# Patient Record
Sex: MALE | Race: WHITE | Employment: OTHER | URBAN - METROPOLITAN AREA
[De-identification: names, ages, dates, MRNs, and addresses within clinical notes are randomized per-mention and may not be internally consistent; named-entity substitution may affect disease eponyms.]

---

## 2017-06-20 PROBLEM — E11.9 TYPE 2 DIABETES MELLITUS WITHOUT COMPLICATION (HCC): Status: ACTIVE | Noted: 2017-06-20

## 2017-09-18 PROBLEM — E11.9 TYPE 2 DIABETES MELLITUS WITHOUT COMPLICATION (HCC): Status: RESOLVED | Noted: 2017-06-20 | Resolved: 2017-09-18

## 2017-10-17 ENCOUNTER — HOSPITAL ENCOUNTER (OUTPATIENT)
Dept: GENERAL RADIOLOGY | Age: 77
Discharge: HOME | End: 2017-10-17
Attending: INTERNAL MEDICINE
Payer: MEDICARE

## 2017-10-17 DIAGNOSIS — M48.061 SPINAL STENOSIS OF LUMBAR REGION WITH RADICULOPATHY: ICD-10-CM

## 2017-10-17 DIAGNOSIS — M54.50 CHRONIC MIDLINE LOW BACK PAIN WITHOUT SCIATICA: ICD-10-CM

## 2017-10-17 DIAGNOSIS — M54.16 SPINAL STENOSIS OF LUMBAR REGION WITH RADICULOPATHY: ICD-10-CM

## 2017-10-17 DIAGNOSIS — G89.29 CHRONIC MIDLINE LOW BACK PAIN WITHOUT SCIATICA: ICD-10-CM

## 2017-10-17 PROBLEM — R31.9 HEMATURIA: Status: ACTIVE | Noted: 2017-10-17

## 2017-10-17 PROBLEM — Z98.890 STATUS POST LAMINECTOMY: Status: ACTIVE | Noted: 2017-10-17

## 2017-10-17 PROCEDURE — 72100 X-RAY EXAM L-S SPINE 2/3 VWS: CPT

## 2017-10-17 NOTE — PROGRESS NOTES
X-ray of the spine shows no acute bony abnormalities. There is evidence of degenerative changes. Follow management as discussed today. You have been referred to physical therapy.

## 2017-10-23 NOTE — PROGRESS NOTES
Ambulatory/Rehab Services H2 Model Falls Risk Assessment    Risk Factor Pts. ·   Confusion/Disorientation/Impulsivity  []    4 ·   Symptomatic Depression  []   2 ·   Altered Elimination  []   1 ·   Dizziness/Vertigo  []   1 ·   Gender (Male)  [x]   1 ·   Any administered antiepileptics (anticonvulsants):  []   2 ·   Any administered benzodiazepines:  []   1 ·   Visual Impairment (specify):  []   1 ·   Portable Oxygen Use  []   1 ·   Orthostatic ? BP  []   1 ·   History of Recent Falls (within 3 mos.)  []   5     Ability to Rise from Chair (choose one) Pts. ·   Ability to rise in a single movement  [x]   0 ·   Pushes up, successful in one attempt  []   1 ·   Multiple attempts, but successful  []   3 ·   Unable to rise without assistance  []   4   Total: (5 or greater = High Risk) 1     Falls Prevention Plan:   []                Physical Limitations to Exercise (specify):   []                Mobility Assistance Device (type):   []                Exercise/Equipment Adaptation (specify):    ©2010 [facility] of [address] Patent #[id number].  Federal Law prohibits the replication, distribution or use without written permission from [facility] [place]

## 2017-10-23 NOTE — PROGRESS NOTES
Bucky Moore  : 1940 [address] [facility] at [address], [address], [facility], [address]  [phone]   [phone]       OUTPATIENT PHYSICAL THERAPY:Initial Assessment, Treatment Day: Day of Assessment and AM 10/24/2017    ICD-10: Treatment Diagnosis:   Low back pain (M54.5)        Muscle weakness (generalized) (M62.81)   Unsteadiness on feet (R26.81)             Precautions/Allergies:   Review of patient's allergies indicates no known allergies. Fall Risk Score: 1 (? 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 REFERRING PHYSICIAN: Alfa Shin*  RETURN PHYSICIAN APPOINTMENT: TBD by patient if needed. INITIAL ASSESSMENT:  Mr. Bucky Moore has attended 1 physical therapy session including initial evaluation. He has localized back pain that is associated with spinal stenosis (flexion prefererence)   Bucky Moore exhibits decreased flexibility, decreased balance, increased pain (endurance related), decreased postural and core strength, decreased functional tolerance. No pelvic malalignment upon initial evaluation but decreased posture. Pain is endurance in nature as he is deconditioned. Hx of Laminectomy 2016. Bucky Moore will benefit from skilled PT (medically necessary) to address above deficits affecting participation in basic ADLs and overall functional tolerance. Manual techniques (stretching, joint mobilizations, soft tissue mobilization/myofascial release), postural exercises/education, therapeutic techniques/activities, and HEP will be performed as appropriate addressing Kyle Carolina's current condition. PROBLEM LIST (Impacting functional limitations):  1. Decreased Strength  2.  Decreased ADL/Functional Activities  3. Decreased Transfer Abilities  4. Decreased Ambulation Ability/Technique  5. Decreased Balance  6. Increased Pain  7. Decreased Activity Tolerance  8. Increased Fatigue  9. Increased Shortness of Breath  10. Decreased Flexibility/Joint Mobility  11. Decreased [place] with Home Exercise Program INTERVENTIONS PLANNED:  1. Balance Exercise  2. Bed Mobility  3. Cold  4. Cryotherapy  5. Electrical Stimulation  6. Family Education  7. Gait Training  8. Heat  9. Home Exercise Program (HEP)  10. Manual Therapy  11. Neuromuscular Re-education/Strengthening  12. Range of Motion (ROM)  13. Therapeutic Activites  14. Therapeutic Exercise/Strengthening  15. Transfer Training   TREATMENT PLAN:  Effective Dates: 10/23/17 TO 1/23/18. Frequency/Duration: 1 times a week for 12 weeks --patient requested to come 1x a week--will adjust if needed to meet goals. GOALS: (Goals have been discussed and agreed upon with patient.)  Short Term Goals 4 weeks   1. Rian Fairchild will be independent with HEP  2. Rian Fairchild will participate in LE stretching program to increase flexibility  3. Rian Fairchild will participate in core stabilization exercises to help with stabilization during ADLs  4. Rian Fairchild will participate in LE strengthening program with weights as appropriate to help with gait and elevations  5. Rian Fairchild will participate in static and dynamic balance activities to decrease the risk for falls  6. Rian Fairchild will be able to stand > 10 minutes with <=2/10 pain and minima to no difficulty to wash dishes and perform household ADLs. 7.   Long Term Goals 8 weeks   1. Rian Fairchild will demonstrate a 10 point improvement on the Oswestry to show improvement in function  2. Rian Fairchild will report 0/10 pain at rest and during ADLs  3. Rian Fairchild will demonstrate 5/5 LE strength on manual muscle testing  4.  Rian Fairchild will be able to perform SLS >5 seconds bilaterally to help with gait and improve balance  Rehabilitation Potential For Stated Goals: Good  Regarding Kyle Carolina's therapy, I certify that the treatment plan above will be carried out by a therapist or under their direction. Thank you for this referral,  RAUL StewardT     Referring Physician Signature: Grant Bergman*              Date                    HISTORY:   History of Present Injury/Illness (Reason for Referral): I am having lower back pain. I had spinal stenosis for 10+ years and spinal surgery last year (laminectomy). Now, my lower back is bothering me. He has pain with standing a few minutes. · Aggravating factors: Standing > 10 minutes. Walking > 10 minutes. · Relieving factors: Sitting. Per chart review 10.17.17:     Patient complains of gradually worsening of lower back pain for last several month. He states that the pain is occurring in the area below where he had his previous back surgery. Pain is aggravated when he stands for a long time for example when he is washing dishes. Pain is relieved by sitting and by using tramadol. He describes the pain as a scale of 6 out of 10 at its worst .  Tramadol tends to remove the pain. Pain is nonradiating. Pain does not interfere with sleep. Since his laminectomy in April 2016 he has regained some strength in his legs and denies any worsening of leg strength. He has used long-term tramadol for this pain before. He had some medications left over and he has been using it occasionally over the last 2 days to have relief of pain. Tylenol did not relieve the pain. He wants to avoid nonsteroidals because of concerns about its effect on the kidney. He denies any recent trauma. There has been no fevers or chills. He has asked for refill of tramadol for the pain.   Past Medical History/Comorbidities:   Mr. Savanna Ballard  has a past medical history of Arthritis (6/23/2015); BDR (background diabetic retinopathy) ([facility] [address] (6/23/2015); Depression (6/23/2015); Diabetes mellitus type 2, controlled ([address] Diabetic retinopathy ([address] (6/23/2015); Heartburn (6/23/2015); Hyperlipidemia (6/23/2015); Right-sided heart failure; Sleep apnea; Spinal stenosis; Spinal stenosis of lumbar region with radiculopathy (10/17/2017); Status post laminectomy (10/17/2017); and Type II or unspecified type diabetes mellitus without mention of complication, not stated as uncontrolled. Mr. Leonid Hollingsworth  has a past surgical history that includes cyst removal; cyst removal; heent (Right, 2014); orthopaedic (04/05/2016); cataract removal (Left); and colonoscopy (10/10/2017). Social History/Living Environment:        Social History     Social History    Marital status:      Spouse name: N/A    Number of children: N/A    Years of education: N/A     Occupational History    Not on file. Social History Main Topics    Smoking status: Never Smoker    Smokeless tobacco: Never Used    Alcohol use No    Drug use: No    Sexual activity: No     Other Topics Concern    Not on file     Social History Narrative     Prior Level of Function/Work/Activity:  Retired.       Active Ambulatory Problems     Diagnosis Date Noted    Obstructive sleep apnea (adult) (pediatric) 03/19/2015    Obesity 03/19/2015    Hypertension 03/19/2015    Lower extremity edema 03/19/2015    Gastroesophageal reflux disease 03/19/2015    Benign prostatic hyperplasia 03/19/2015    CKD (chronic kidney disease) 02/25/2016    Weakness of lower extremity 03/03/2016    Controlled type 2 diabetes mellitus with complication, without long-term current use of insulin ([address] 10/20/2016    Background diabetic retinopathy ([facility] [address] 11/09/2016    Bilateral edema of lower extremity 11/12/2016    Chronic midline low back pain without sciatica 10/17/2017    Status post laminectomy 10/17/2017    Spinal stenosis of lumbar region with radiculopathy 10/17/2017    Hematuria 10/17/2017     Resolved Ambulatory Problems     Diagnosis Date Noted    Hypoxemia 03/19/2015    Shortness of breath 03/19/2015    Dyslipidemia 03/19/2015    Right-sided heart failure 03/19/2015    Diabetes ([address] 03/19/2015    FH: BPH (benign prostatic hypertrophy) 03/19/2015    Hyperlipidemia 06/23/2015    Diabetic retinopathy ([address] 06/23/2015    Spinal stenosis     Weakness 03/03/2016    Diabetes mellitus type II, controlled ([address] 07/21/2016    Cough 07/21/2016    Controlled type 2 diabetes mellitus without complication, without long-term current use of insulin ([address] 11/12/2016    Type 2 diabetes mellitus without complication ([address] 80/87/7856     Past Medical History:   Diagnosis Date    Arthritis 6/23/2015    BDR (background diabetic retinopathy) ([address] 6/23/2015    Depression 6/23/2015    Diabetes mellitus type 2, controlled ([address]     Diabetic retinopathy ([facility] [address] 6/23/2015    Heartburn 6/23/2015    Hyperlipidemia 6/23/2015    Right-sided heart failure     Sleep apnea     Spinal stenosis     Spinal stenosis of lumbar region with radiculopathy 10/17/2017    Status post laminectomy 10/17/2017    Type II or unspecified type diabetes mellitus without mention of complication, not stated as uncontrolled      Note: Patient denies any increase of symptoms with cough, sneeze or valsalva. Patient denies any saddle paresthesia or bowel/bladder deficits. Personal Factors:          Sex:  male        Age:  78 y.o.     Current Medications:    Current Outpatient Prescriptions:     traMADol (ULTRAM) 50 mg tablet, Take 2 tabs bid prn for back pain  Indications: Pain, Disp: 30 Tab, Rfl: 0    omeprazole (PRILOSEC) 20 mg capsule, TAKE ONE CAPSULE BY MOUTH TWICE DAILY, Disp: 180 Cap, Rfl: 3    metFORMIN (GLUCOPHAGE) 500 mg tablet, TAKE TWO TABLETS BY MOUTH IN THE MORNING AND TWO IN THE EVENING, Disp: 120 Tab, Rfl: 11    sulindac (CLINORIL) 200 mg tablet, TAKE ONE TABLET BY MOUTH TWICE DAILY, Disp: 180 Tab, Rfl: 0    glucose blood VI test strips (ACCU-CHEK BALBIR PLUS TEST STRP) strip, Use to check blood sugar 1 time daily. Dx code: E11.9, Disp: 100 Strip, Rfl: 11    linagliptin (TRADJENTA) 5 mg tablet, Take 1 Tab by mouth daily. , Disp: 30 Tab, Rfl: 11    tamsulosin (FLOMAX) 0.4 mg capsule, Take 1 Cap by mouth daily. , Disp: 90 Cap, Rfl: 3    pravastatin (PRAVACHOL) 80 mg tablet, Take 1 Tab by mouth daily. , Disp: 90 Tab, Rfl: 3    lisinopril (PRINIVIL, ZESTRIL) 5 mg tablet, Take 1 Tab by mouth daily. , Disp: 90 Tab, Rfl: 3    triamterene-hydroCHLOROthiazide (MAXZIDE-25MG) 37.5-25 mg per tablet, Take 1 Tab by mouth daily. , Disp: 90 Tab, Rfl: 3    fluticasone (FLONASE) 50 mcg/actuation nasal spray, 2 Sprays by Both Nostrils route daily. , Disp: 1 Bottle, Rfl: 3    multivitamin (ONE A DAY) tablet, Take 1 Tab by mouth daily. , Disp: , Rfl:     aspirin delayed-release 81 mg tablet, Take  by mouth daily. , Disp: , Rfl:     cholecalciferol, VITAMIN D3, (VITAMIN D3) 5,000 unit tab tablet, Take  by mouth daily. , Disp: , Rfl:     cpap machine kit, by Does Not Apply route. autopap 7-20, Disp: , Rfl:      Date Last Reviewed:  10/24/2017   Number of Personal Factors/Comorbidities that affect the Plan of Care: 3+: HIGH COMPLEXITY   EXAMINATION:   Observation/Orthostatic Postural Assessment:          Very large girth and uses cane to ambulate. Uses cane in R hand---has been using for a long time. Palpation:          No tenderness to low back with palpation.     ROM:            AROM/PROM         Joint: Eval Date:  Re-Assess Date:  Re-Assess Date:    Active ROM RIGHT LEFT RIGHT LEFT RIGHT LEFT   Knee Extension [facility] WFL       Knee Flexion [facility]       Hip Flexion Limited Limited       Hip Abduction [facility]       Lumbar Flexion 6 in from floor tips of fingers        Lumbar Extension 80% with no pain        Lumbar Side-bending 100% with no pain, no pain with % with no pain, no pain with OP       Lumbar Rotation 100% with no pain, no pain with % with no pain, no pain with OP         Strength:     Eval Date:  Re-Assess Date:  Re-Assess Date:      RIGHT LEFT RIGHT LEFT RIGHT LEFT   Knee Flexion 4/5 4/5       Knee Extension 4+/5 4+/5       Hip Flexion 4/5 4/5       Hip Abduction 5/5 5/5       Ankle Dorsiflexion 5/5 5/5                           Single leg stance right/left: 2 seconds bilaterally with eyes open.                Deep squat: Difficulty performing secondary to balance deficits    Ham 90/90:   RIGHT LE: 35 deg   LEFT LE: 40 deg    Special Tests: Assessed @ Initial Visit    CELIA 4: Negative   SLUMP TEST:  Negative  Neurological Screen: Assessed @ Initial Visit    RADIATING SYMPTOMS?: YES/NO---None, localized pain. Functional Mobility:  Assessed @ Initial Visit Affecting participation in basic ADLs and functional tasks. Balance:          Medial to lateral sway with ambulation--uses cane and has overall fair balance. Body Structures Involved:  1. Bones  2. Joints  3. Muscles  4. Ligaments Body Functions Affected:  1. Sensory/Pain  2. Neuromusculoskeletal  3. Movement Related Activities and Participation Affected:  1. Mobility  2. Self Care   Number of elements that affect the Plan of Care: 4+: HIGH COMPLEXITY   CLINICAL PRESENTATION:   Presentation: Evolving clinical presentation with changing clinical characteristics: MODERATE COMPLEXITY   CLINICAL DECISION MAKING:   Outcome Measure: Tool Used: Modified Oswestry Low Back Pain Questionnaire  Score:  Initial: 23/50  Most Recent: X/50 (Date: -- )   Interpretation of Score: Each section is scored on a 0-5 scale, 5 representing the greatest disability. The scores of each section are added together for a total score of 50. Score 0 1-10 11-20 21-30 31-40 41-49 50   Modifier CH CI CJ CK CL CM CN     ?  Mobility - Walking and Moving Around:     - CURRENT STATUS: CK - 40%-59% impaired, limited or restricted    - GOAL STATUS: CJ - 20%-39% impaired, limited or restricted    - D/C STATUS:  ---------------To be determined---------------    Medical Necessity:   · Skilled intervention continues to be required due to above deficits affecting participation in basic ADLs and overall functional tolerance. Reason for Services/Other Comments:  · Patient continues to require skilled intervention due to  above deficits affecting participation in basic ADLs and overall functional tolerance. Use of outcome tool(s) and clinical judgement create a POC that gives a: Clear prediction of patient's progress: LOW COMPLEXITY   TREATMENT:   (In addition to Assessment/Re-Assessment sessions the following treatments were rendered)    Eval 20 minutes     THERAPEUTIC EXERCISE: (15 minutes):  Exercises per grid below to improve mobility, strength and balance. Required minimal visual and verbal cues to promote proper body alignment and promote proper body posture. Progressed resistance, range and complexity of movement as indicated. Date:  10/24/17 Date:   Date:     Activity/Exercise Parameters Parameters Parameters   Hamstring Stretch 30\"x3     LTR 10\"X10     Bridging 20     Hookyling hip abduction Green 2x 10     NuStep      Standing marching      Standing heel raises      Gastroc Stretch      Toe Taps standing                                    MANUAL THERAPY: (10  minutes): Joint mobilization, Soft tissue mobilization and Manipulation was utilized and necessary because of the patient's restricted joint motion, painful spasm, restricted motion of soft tissue. (Used abbreviations: MET - muscle energy technique; PNF - proprioceptive neuromuscular facilitation; NMR - neuromuscular re-education; AP - anterior to posterior; PA - posterior to anterior)  STM to lower back in R sidelying as well as manual stretching B hamstrings and lateral hips. MODALITIES: (None minutes):               Treatment/Session Assessment:  Bucky Moore verbalized understanding of role of PT and POC.   · Pain/ Symptoms: Initial: 0/10--if standing >10 minutes he will get a dull ache to middle lower back. Post Session:  5/10 ·   Compliance with Program/Exercises: Will assess as treatment progresses. · Recommendations/Intent for next treatment session: \"Next visit will focus on advancements to more challenging activities\".     Future Appointments  Date Time Provider Chelita Aguilar   10/27/2017 10:30 AM Lynn Carcamo DPT [facility]   11/2/2017 11:00 AM C[facility]   11/7/2017 10:00 AM [facility]   11/16/2017 10:00 AM CELSO [facility]   1/11/2018 8:30 AM [facility]   1/18/2018 9:15 AM Maycol Luciano MD [facility]       Total Treatment Duration:  PT Patient Time In/Time Out  Time In: 1000  Time Out: [address] Trujillo [facility], [facility], DPT

## 2017-10-24 ENCOUNTER — HOSPITAL ENCOUNTER (OUTPATIENT)
Dept: PHYSICAL THERAPY | Age: 77
Discharge: HOME | End: 2017-10-24
Attending: INTERNAL MEDICINE
Payer: MEDICARE

## 2017-10-24 DIAGNOSIS — G89.29 CHRONIC MIDLINE LOW BACK PAIN WITHOUT SCIATICA: ICD-10-CM

## 2017-10-24 DIAGNOSIS — M54.50 CHRONIC MIDLINE LOW BACK PAIN WITHOUT SCIATICA: ICD-10-CM

## 2017-10-24 PROCEDURE — G8978 MOBILITY CURRENT STATUS: HCPCS

## 2017-10-24 PROCEDURE — 97140 MANUAL THERAPY 1/> REGIONS: CPT

## 2017-10-24 PROCEDURE — 97161 PT EVAL LOW COMPLEX 20 MIN: CPT

## 2017-10-24 PROCEDURE — G8979 MOBILITY GOAL STATUS: HCPCS

## 2017-10-24 PROCEDURE — 97110 THERAPEUTIC EXERCISES: CPT

## 2017-10-26 NOTE — PROGRESS NOTES
Jaymie Cortez  : 1940 [address] at [place] [address] [address], [place], [address]  [phone]   [phone]       OUTPATIENT PHYSICAL THERAPY:Daily Note, Treatment Day: 2nd and AM 10/27/2017    ICD-10: Treatment Diagnosis:   Low back pain (M54.5)        Muscle weakness (generalized) (M62.81)   Unsteadiness on feet (R26.81)             Precautions/Allergies:   Review of patient's allergies indicates no known allergies. Fall Risk Score: 1 (? 5 = High Risk)  MD Orders: Eval and Treat ---Address strengthening and gait  MEDICAL/REFERRING DIAGNOSIS:  Low back pain [M54.5]   DATE OF ONSET: Chronic (exacerbated the last couple of months). REFERRING PHYSICIAN: Priyank Mcgee*  RETURN PHYSICIAN APPOINTMENT: TBD by patient if needed. INITIAL ASSESSMENT:  Mr. Jaymie Cortez has attended 1 physical therapy session including initial evaluation. He has localized back pain that is associated with spinal stenosis (flexion prefererence)   Jaymie Cortez exhibits decreased flexibility, decreased balance, increased pain (endurance related), decreased postural and core strength, decreased functional tolerance. No pelvic malalignment upon initial evaluation but decreased posture. Pain is endurance in nature as he is deconditioned. Hx of Laminectomy 2016. Jaymie Cortez will benefit from skilled PT (medically necessary) to address above deficits affecting participation in basic ADLs and overall functional tolerance. Manual techniques (stretching, joint mobilizations, soft tissue mobilization/myofascial release), postural exercises/education, therapeutic techniques/activities, and HEP will be performed as appropriate addressing Kyle Carolina's current condition. PROBLEM LIST (Impacting functional limitations):  1. Decreased Strength  2. Decreased ADL/Functional Activities  3. Decreased Transfer Abilities  4.  Decreased Ambulation Ability/Technique  5. Decreased Balance  6. Increased Pain  7. Decreased Activity Tolerance  8. Increased Fatigue  9. Increased Shortness of Breath  10. Decreased Flexibility/Joint Mobility  11. Decreased Hale with Home Exercise Program INTERVENTIONS PLANNED:  1. Balance Exercise  2. Bed Mobility  3. Cold  4. Cryotherapy  5. Electrical Stimulation  6. Family Education  7. Gait Training  8. Heat  9. Home Exercise Program (HEP)  10. Manual Therapy  11. Neuromuscular Re-education/Strengthening  12. Range of Motion (ROM)  13. Therapeutic Activites  14. Therapeutic Exercise/Strengthening  15. Transfer Training   TREATMENT PLAN:  Effective Dates: 10/23/17 TO 1/23/18. Frequency/Duration: 1 times a week for 12 weeks --patient requested to come 1x a week--will adjust if needed to meet goals. GOALS: (Goals have been discussed and agreed upon with patient.)  Short Term Goals 4 weeks   1. Nani Banuelos will be independent with HEP  2. Nani Banuelos will participate in LE stretching program to increase flexibility  3. Nani Banuelos will participate in core stabilization exercises to help with stabilization during ADLs  4. Nani Banuelos will participate in LE strengthening program with weights as appropriate to help with gait and elevations  5. Nani Banuelos will participate in static and dynamic balance activities to decrease the risk for falls  6. Nani Banuelos will be able to stand > 10 minutes with <=2/10 pain and minima to no difficulty to wash dishes and perform household ADLs. 7.   Long Term Goals 8 weeks   1. Nani Banuelos will demonstrate a 10 point improvement on the Oswestry to show improvement in function  2. Nani Banuelos will report 0/10 pain at rest and during ADLs  3. Nani Banuelos will demonstrate 5/5 LE strength on manual muscle testing  4.  Nani Banuelos will be able to perform SLS >5 seconds bilaterally to help with gait and improve balance  Rehabilitation Potential For Stated Goals: Good  Regarding Kyle Carolina's therapy, I certify that the treatment plan above will be carried out by a therapist or under their direction. Thank you for this referral,  Rosita Mishra DPT     Referring Physician Signature: Remywaldemar Muniz*              Date                    HISTORY:   History of Present Injury/Illness (Reason for Referral): I am having lower back pain. I had spinal stenosis for 10+ years and spinal surgery last year (laminectomy). Now, my lower back is bothering me. He has pain with standing a few minutes. · Aggravating factors: Standing > 10 minutes. Walking > 10 minutes. · Relieving factors: Sitting. Per chart review 10.17.17:     Patient complains of gradually worsening of lower back pain for last several month. He states that the pain is occurring in the area below where he had his previous back surgery. Pain is aggravated when he stands for a long time for example when he is washing dishes. Pain is relieved by sitting and by using tramadol. He describes the pain as a scale of 6 out of 10 at its worst .  Tramadol tends to remove the pain. Pain is nonradiating. Pain does not interfere with sleep. Since his laminectomy in April 2016 he has regained some strength in his legs and denies any worsening of leg strength. He has used long-term tramadol for this pain before. He had some medications left over and he has been using it occasionally over the last 2 days to have relief of pain. Tylenol did not relieve the pain. He wants to avoid nonsteroidals because of concerns about its effect on the kidney. He denies any recent trauma. There has been no fevers or chills. He has asked for refill of tramadol for the pain. Past Medical History/Comorbidities:   Mr. Albin Neal  has a past medical history of Arthritis (6/23/2015); BDR (background diabetic retinopathy) ([facility] [address] (6/23/2015); Depression (6/23/2015);  Diabetes mellitus type 2, controlled ([address] Diabetic retinopathy ([address] (6/23/2015); Heartburn (6/23/2015); Hyperlipidemia (6/23/2015); Right-sided heart failure; Sleep apnea; Spinal stenosis; Spinal stenosis of lumbar region with radiculopathy (10/17/2017); Status post laminectomy (10/17/2017); and Type II or unspecified type diabetes mellitus without mention of complication, not stated as uncontrolled. Mr. Vladimir Templeton  has a past surgical history that includes cyst removal; cyst removal; heent (Right, 2014); orthopaedic (04/05/2016); cataract removal (Left); and colonoscopy (10/10/2017). Social History/Living Environment:        Social History     Social History    Marital status:      Spouse name: N/A    Number of children: N/A    Years of education: N/A     Occupational History    Not on file. Social History Main Topics    Smoking status: Never Smoker    Smokeless tobacco: Never Used    Alcohol use No    Drug use: No    Sexual activity: No     Other Topics Concern    Not on file     Social History Narrative     Prior Level of Function/Work/Activity:  Retired.       Active Ambulatory Problems     Diagnosis Date Noted    Obstructive sleep apnea (adult) (pediatric) 03/19/2015    Obesity 03/19/2015    Hypertension 03/19/2015    Lower extremity edema 03/19/2015    Gastroesophageal reflux disease 03/19/2015    Benign prostatic hyperplasia 03/19/2015    CKD (chronic kidney disease) 02/25/2016    Weakness of lower extremity 03/03/2016    Controlled type 2 diabetes mellitus with complication, without long-term current use of insulin ([address] 10/20/2016    Background diabetic retinopathy ([address] 11/09/2016    Bilateral edema of lower extremity 11/12/2016    Chronic midline low back pain without sciatica 10/17/2017    Status post laminectomy 10/17/2017    Spinal stenosis of lumbar region with radiculopathy 10/17/2017    Hematuria 10/17/2017     Resolved Ambulatory Problems     Diagnosis Date Noted    Hypoxemia 03/19/2015    Shortness of breath 03/19/2015    Dyslipidemia 03/19/2015    Right-sided heart failure 03/19/2015    Diabetes ([facility] [address] 03/19/2015    FH: BPH (benign prostatic hypertrophy) 03/19/2015    Hyperlipidemia 06/23/2015    Diabetic retinopathy ([address] 06/23/2015    Spinal stenosis     Weakness 03/03/2016    Diabetes mellitus type II, controlled ([address] 07/21/2016    Cough 07/21/2016    Controlled type 2 diabetes mellitus without complication, without long-term current use of insulin ([address] 11/12/2016    Type 2 diabetes mellitus without complication ([address] [birth year]     Past Medical History:   Diagnosis Date    Arthritis 6/23/2015    BDR (background diabetic retinopathy) ([facility] [address] 6/23/2015    Depression 6/23/2015    Diabetes mellitus type 2, controlled ([address]     Diabetic retinopathy ([facility] [address] 6/23/2015    Heartburn 6/23/2015    Hyperlipidemia 6/23/2015    Right-sided heart failure     Sleep apnea     Spinal stenosis     Spinal stenosis of lumbar region with radiculopathy 10/17/2017    Status post laminectomy 10/17/2017    Type II or unspecified type diabetes mellitus without mention of complication, not stated as uncontrolled      Note: Patient denies any increase of symptoms with cough, sneeze or valsalva. Patient denies any saddle paresthesia or bowel/bladder deficits. Personal Factors:          Sex:  male        Age:  78 y.o.     Current Medications:    Current Outpatient Prescriptions:     traMADol (ULTRAM) 50 mg tablet, Take 2 tabs bid prn for back pain  Indications: Pain, Disp: 30 Tab, Rfl: 0    omeprazole (PRILOSEC) 20 mg capsule, TAKE ONE CAPSULE BY MOUTH TWICE DAILY, Disp: 180 Cap, Rfl: 3    metFORMIN (GLUCOPHAGE) 500 mg tablet, TAKE TWO TABLETS BY MOUTH IN THE MORNING AND TWO IN THE EVENING, Disp: 120 Tab, Rfl: 11    sulindac (CLINORIL) 200 mg tablet, TAKE ONE TABLET BY MOUTH TWICE DAILY, Disp: 180 Tab, Rfl: 0    glucose blood VI test strips (ACCU-CHEK BALBIR PLUS TEST STRP) strip, Use to check blood sugar 1 time daily. Dx code: E11.9, Disp: 100 Strip, Rfl: 11    linagliptin (TRADJENTA) 5 mg tablet, Take 1 Tab by mouth daily. , Disp: 30 Tab, Rfl: 11    tamsulosin (FLOMAX) 0.4 mg capsule, Take 1 Cap by mouth daily. , Disp: 90 Cap, Rfl: 3    pravastatin (PRAVACHOL) 80 mg tablet, Take 1 Tab by mouth daily. , Disp: 90 Tab, Rfl: 3    lisinopril (PRINIVIL, ZESTRIL) 5 mg tablet, Take 1 Tab by mouth daily. , Disp: 90 Tab, Rfl: 3    triamterene-hydroCHLOROthiazide (MAXZIDE-25MG) 37.5-25 mg per tablet, Take 1 Tab by mouth daily. , Disp: 90 Tab, Rfl: 3    fluticasone (FLONASE) 50 mcg/actuation nasal spray, 2 Sprays by Both Nostrils route daily. , Disp: 1 Bottle, Rfl: 3    multivitamin (ONE A DAY) tablet, Take 1 Tab by mouth daily. , Disp: , Rfl:     aspirin delayed-release 81 mg tablet, Take  by mouth daily. , Disp: , Rfl:     cholecalciferol, VITAMIN D3, (VITAMIN D3) 5,000 unit tab tablet, Take  by mouth daily. , Disp: , Rfl:     cpap machine kit, by Does Not Apply route. autopap 7-20, Disp: , Rfl:      Date Last Reviewed:  10/27/2017   Number of Personal Factors/Comorbidities that affect the Plan of Care: 3+: HIGH COMPLEXITY   EXAMINATION:   Observation/Orthostatic Postural Assessment:          Very large girth and uses cane to ambulate. Uses cane in R hand---has been using for a long time. Palpation:          No tenderness to low back with palpation.     ROM:            AROM/PROM         Joint: Eval Date:  Re-Assess Date:  Re-Assess Date:    Active ROM RIGHT LEFT RIGHT LEFT RIGHT LEFT   Knee Extension [facility] WFL       Knee Flexion [facility] WFL       Hip Flexion Limited Limited       Hip Abduction [facility]       Lumbar Flexion 6 in from floor tips of fingers        Lumbar Extension 80% with no pain        Lumbar Side-bending 100% with no pain, no pain with % with no pain, no pain with OP       Lumbar Rotation 100% with no pain, no pain with % with no pain, no pain with OP         Strength:     Rj Date:  Re-Assess Date:  Re-Assess Date:      RIGHT LEFT RIGHT LEFT RIGHT LEFT   Knee Flexion 4/5 4/5       Knee Extension 4+/5 4+/5       Hip Flexion 4/5 4/5       Hip Abduction 5/5 5/5       Ankle Dorsiflexion 5/5 5/5                           Single leg stance right/left: 2 seconds bilaterally with eyes open.                Deep squat: Difficulty performing secondary to balance deficits    Ham 90/90:   RIGHT LE: 35 deg   LEFT LE: 40 deg    Special Tests: Assessed @ Initial Visit    CELIA 4: Negative   SLUMP TEST:  Negative  Neurological Screen: Assessed @ Initial Visit    RADIATING SYMPTOMS?: YES/NO---None, localized pain. Functional Mobility:  Assessed @ Initial Visit Affecting participation in basic ADLs and functional tasks. Balance:          Medial to lateral sway with ambulation--uses cane and has overall fair balance. Body Structures Involved:  1. Bones  2. Joints  3. Muscles  4. Ligaments Body Functions Affected:  1. Sensory/Pain  2. Neuromusculoskeletal  3. Movement Related Activities and Participation Affected:  1. Mobility  2. Self Care   Number of elements that affect the Plan of Care: 4+: HIGH COMPLEXITY   CLINICAL PRESENTATION:   Presentation: Evolving clinical presentation with changing clinical characteristics: MODERATE COMPLEXITY   CLINICAL DECISION MAKING:   Outcome Measure: Tool Used: Modified Oswestry Low Back Pain Questionnaire  Score:  Initial: 23/50  Most Recent: X/50 (Date: -- )   Interpretation of Score: Each section is scored on a 0-5 scale, 5 representing the greatest disability. The scores of each section are added together for a total score of 50. Score 0 1-10 11-20 21-30 31-40 41-49 50   Modifier CH CI CJ CK CL CM CN     ?  Mobility - Walking and Moving Around:     - CURRENT STATUS: CK - 40%-59% impaired, limited or restricted    - GOAL STATUS: CJ - 20%-39% impaired, limited or restricted    - D/C STATUS:  ---------------To be determined---------------    Medical Necessity:   · Skilled intervention continues to be required due to above deficits affecting participation in basic ADLs and overall functional tolerance. Reason for Services/Other Comments:  · Patient continues to require skilled intervention due to  above deficits affecting participation in basic ADLs and overall functional tolerance. Use of outcome tool(s) and clinical judgement create a POC that gives a: Clear prediction of patient's progress: LOW COMPLEXITY   TREATMENT:   (In addition to Assessment/Re-Assessment sessions the following treatments were rendered)      THERAPEUTIC EXERCISE: (38 minutes):  Exercises per grid below to improve mobility, strength and balance. Required minimal visual and verbal cues to promote proper body alignment and promote proper body posture. Progressed resistance, range and complexity of movement as indicated. Date:  10/24/17 Date:  10/27/17   Date:     Activity/Exercise Parameters Parameters Parameters   Hamstring Stretch 30\"x3 30\"X3    LTR 10\"X10 10\"X10    Bridging 20 25    SKTC      Hookyling hip abduction Green 2x 10 Blue 25    NuStep  5 minute level 3    Standing marching  20    Standing heel raises  NT    Gastroc Stretch  30\"X3    Toe Taps standing      Rows  GREEN 20X                                  MANUAL THERAPY: (2 minutes): Joint mobilization, Soft tissue mobilization and Manipulation was utilized and necessary because of the patient's restricted joint motion, painful spasm, restricted motion of soft tissue. (Used abbreviations: MET - muscle energy technique; PNF - proprioceptive neuromuscular facilitation; NMR - neuromuscular re-education; AP - anterior to posterior; PA - posterior to anterior)  manual stretching B hamstrings and lateral hips. MODALITIES: (None minutes):               Treatment/Session Assessment:  Clement Hatch verbalized understanding of role of PT and POC.   Clement Hatch shows slight anterior pelvic tilt that improves with cuing. He had 1 LOB but was able to control it without PT intervention. Continued exercises, but progressed strengthening and balance as seen above. He has not gotten any pain since initial evalution. · Pain/ Symptoms: Initial:   0/10--if standing >10 minutes he will get a dull ache to middle lower back. Post Session:  0/10 ·   Compliance with Program/Exercises: Will assess as treatment progresses. · Recommendations/Intent for next treatment session: \"Next visit will focus on advancements to more challenging activities\".     Future Appointments  Date Time Provider Chelita Marinoi   11/2/2017 11:00 AM Yuridia Perez DPT [facility]   11/7/2017 10:00 AM CELSO Maher [facility]   11/16/2017 10:00 AM CELSO Maher [facility]   1/11/2018 8:30 AM [facility] WENDY [facility]   1/18/2018 9:15 AM Elvia Garcia MD [facility]       Total Treatment Duration:  PT Patient Time In/Time Out  Time In: 1025  Time Out: 400 [facility], [place]

## 2017-10-27 ENCOUNTER — HOSPITAL ENCOUNTER (OUTPATIENT)
Dept: PHYSICAL THERAPY | Age: 77
Discharge: HOME | End: 2017-10-27
Attending: INTERNAL MEDICINE
Payer: MEDICARE

## 2017-10-27 PROCEDURE — 97110 THERAPEUTIC EXERCISES: CPT

## 2017-11-01 NOTE — PROGRESS NOTES
Blanca Linn  : 1940 [address] [facility] at [address], [address] [place] [address], [address]  [phone]   [phone]       OUTPATIENT PHYSICAL THERAPY:Daily Note, Treatment Day: 3rd and AM 2017    ICD-10: Treatment Diagnosis:   Low back pain (M54.5)        Muscle weakness (generalized) (M62.81)   Unsteadiness on feet (R26.81)             Precautions/Allergies:   Review of patient's allergies indicates no known allergies. Fall Risk Score: 1 (? 5 = High Risk)  MD Orders: Eval and Treat ---Address strengthening and gait  MEDICAL/REFERRING DIAGNOSIS:  Low back pain [M54.5]   DATE OF ONSET: Chronic (exacerbated the last couple of months). REFERRING PHYSICIAN: Alia Nobles*  RETURN PHYSICIAN APPOINTMENT: TBD by patient if needed. INITIAL ASSESSMENT:  Mr. Blanca Linn has attended 1 physical therapy session including initial evaluation. He has localized back pain that is associated with spinal stenosis (flexion prefererence)   Blanca Linn exhibits decreased flexibility, decreased balance, increased pain (endurance related), decreased postural and core strength, decreased functional tolerance. No pelvic malalignment upon initial evaluation but decreased posture. Pain is endurance in nature as he is deconditioned. Hx of Laminectomy 2016. Blanca Linn will benefit from skilled PT (medically necessary) to address above deficits affecting participation in basic ADLs and overall functional tolerance. Manual techniques (stretching, joint mobilizations, soft tissue mobilization/myofascial release), postural exercises/education, therapeutic techniques/activities, and HEP will be performed as appropriate addressing Kyle Carolina's current condition. PROBLEM LIST (Impacting functional limitations):  1. Decreased Strength  2. Decreased ADL/Functional Activities  3. Decreased Transfer Abilities  4.  Decreased Ambulation Ability/Technique  5. Decreased Balance  6. Increased Pain  7. Decreased Activity Tolerance  8. Increased Fatigue  9. Increased Shortness of Breath  10. Decreased Flexibility/Joint Mobility  11. Decreased [place] with Home Exercise Program INTERVENTIONS PLANNED:  1. Balance Exercise  2. Bed Mobility  3. Cold  4. Cryotherapy  5. Electrical Stimulation  6. Family Education  7. Gait Training  8. Heat  9. Home Exercise Program (HEP)  10. Manual Therapy  11. Neuromuscular Re-education/Strengthening  12. Range of Motion (ROM)  13. Therapeutic Activites  14. Therapeutic Exercise/Strengthening  15. Transfer Training   TREATMENT PLAN:  Effective Dates: 10/23/17 TO 1/23/18. Frequency/Duration: 1 times a week for 12 weeks --patient requested to come 1x a week--will adjust if needed to meet goals. GOALS: (Goals have been discussed and agreed upon with patient.)  Short Term Goals 4 weeks   1. Teodoro Bruce will be independent with HEP  2. Teodoro Bruce will participate in LE stretching program to increase flexibility  3. Teodoro Bruce will participate in core stabilization exercises to help with stabilization during ADLs  4. Teodoro Bruce will participate in LE strengthening program with weights as appropriate to help with gait and elevations  5. Teodoro Bruce will participate in static and dynamic balance activities to decrease the risk for falls  6. Teodoro Bruce will be able to stand > 10 minutes with <=2/10 pain and minima to no difficulty to wash dishes and perform household ADLs. 7.   Long Term Goals 8 weeks   1. Teodoro Bruce will demonstrate a 10 point improvement on the Oswestry to show improvement in function  2. Teodoro Bruce will report 0/10 pain at rest and during ADLs  3. Teodoro Bruce will demonstrate 5/5 LE strength on manual muscle testing  4.  Teodoro Bruce will be able to perform SLS >5 seconds bilaterally to help with gait and improve balance  Rehabilitation Potential For Stated Goals: Good  Regarding Kyle Carolina's therapy, I certify that the treatment plan above will be carried out by a therapist or under their direction. Thank you for this referral,  Louann Soliz DPT     Referring Physician Signature: Yuly Castillo*              Date                    HISTORY:   History of Present Injury/Illness (Reason for Referral): I am having lower back pain. I had spinal stenosis for 10+ years and spinal surgery last year (laminectomy). Now, my lower back is bothering me. He has pain with standing a few minutes. · Aggravating factors: Standing > 10 minutes. Walking > 10 minutes. · Relieving factors: Sitting. Per chart review 10.17.17:     Patient complains of gradually worsening of lower back pain for last several month. He states that the pain is occurring in the area below where he had his previous back surgery. Pain is aggravated when he stands for a long time for example when he is washing dishes. Pain is relieved by sitting and by using tramadol. He describes the pain as a scale of 6 out of 10 at its worst .  Tramadol tends to remove the pain. Pain is nonradiating. Pain does not interfere with sleep. Since his laminectomy in April 2016 he has regained some strength in his legs and denies any worsening of leg strength. He has used long-term tramadol for this pain before. He had some medications left over and he has been using it occasionally over the last 2 days to have relief of pain. Tylenol did not relieve the pain. He wants to avoid nonsteroidals because of concerns about its effect on the kidney. He denies any recent trauma. There has been no fevers or chills. He has asked for refill of tramadol for the pain. Past Medical History/Comorbidities:   Mr. Hannha Shi  has a past medical history of Arthritis (6/23/2015); BDR (background diabetic retinopathy) ([facility] [address] (6/23/2015); Depression (6/23/2015);  Diabetes mellitus type 2, controlled ([address] Diabetic retinopathy ([address] (6/23/2015); Heartburn (6/23/2015); Hyperlipidemia (6/23/2015); Right-sided heart failure; Sleep apnea; Spinal stenosis; Spinal stenosis of lumbar region with radiculopathy (10/17/2017); Status post laminectomy (10/17/2017); and Type II or unspecified type diabetes mellitus without mention of complication, not stated as uncontrolled. Mr. Devante Hathaway  has a past surgical history that includes cyst removal; cyst removal; heent (Right, 2014); orthopaedic (04/05/2016); cataract removal (Left); and colonoscopy (10/10/2017). Social History/Living Environment:        Social History     Social History    Marital status:      Spouse name: N/A    Number of children: N/A    Years of education: N/A     Occupational History    Not on file. Social History Main Topics    Smoking status: Never Smoker    Smokeless tobacco: Never Used    Alcohol use No    Drug use: No    Sexual activity: No     Other Topics Concern    Not on file     Social History Narrative     Prior Level of Function/Work/Activity:  Retired.       Active Ambulatory Problems     Diagnosis Date Noted    Obstructive sleep apnea (adult) (pediatric) 03/19/2015    Obesity 03/19/2015    Hypertension 03/19/2015    Lower extremity edema 03/19/2015    Gastroesophageal reflux disease 03/19/2015    Benign prostatic hyperplasia 03/19/2015    CKD (chronic kidney disease) 02/25/2016    Weakness of lower extremity 03/03/2016    Controlled type 2 diabetes mellitus with complication, without long-term current use of insulin ([address] 10/20/2016    Background diabetic retinopathy ([facility] [address] 11/09/2016    Bilateral edema of lower extremity 11/12/2016    Chronic midline low back pain without sciatica 10/17/2017    Status post laminectomy 10/17/2017    Spinal stenosis of lumbar region with radiculopathy 10/17/2017    Hematuria 10/17/2017     Resolved Ambulatory Problems     Diagnosis Date Noted    Hypoxemia 03/19/2015    Shortness of breath 03/19/2015    Dyslipidemia 03/19/2015    Right-sided heart failure 03/19/2015    Diabetes ([address] 03/19/2015    FH: BPH (benign prostatic hypertrophy) 03/19/2015    Hyperlipidemia 06/23/2015    Diabetic retinopathy ([address] 06/23/2015    Spinal stenosis     Weakness 03/03/2016    Diabetes mellitus type II, controlled ([address] 07/21/2016    Cough 07/21/2016    Controlled type 2 diabetes mellitus without complication, without long-term current use of insulin ([address] 11/12/2016    Type 2 diabetes mellitus without complication ([address] 43/01/0004     Past Medical History:   Diagnosis Date    Arthritis 6/23/2015    BDR (background diabetic retinopathy) ([facility] [address] 6/23/2015    Depression 6/23/2015    Diabetes mellitus type 2, controlled ([address]     Diabetic retinopathy ([facility] [address] 6/23/2015    Heartburn 6/23/2015    Hyperlipidemia 6/23/2015    Right-sided heart failure     Sleep apnea     Spinal stenosis     Spinal stenosis of lumbar region with radiculopathy 10/17/2017    Status post laminectomy 10/17/2017    Type II or unspecified type diabetes mellitus without mention of complication, not stated as uncontrolled      Note: Patient denies any increase of symptoms with cough, sneeze or valsalva. Patient denies any saddle paresthesia or bowel/bladder deficits. Personal Factors:          Sex:  male        Age:  78 y.o.     Current Medications:    Current Outpatient Prescriptions:     traMADol (ULTRAM) 50 mg tablet, Take 2 tabs bid prn for back pain  Indications: Pain, Disp: 30 Tab, Rfl: 0    omeprazole (PRILOSEC) 20 mg capsule, TAKE ONE CAPSULE BY MOUTH TWICE DAILY, Disp: 180 Cap, Rfl: 3    metFORMIN (GLUCOPHAGE) 500 mg tablet, TAKE TWO TABLETS BY MOUTH IN THE MORNING AND TWO IN THE EVENING, Disp: 120 Tab, Rfl: 11    sulindac (CLINORIL) 200 mg tablet, TAKE ONE TABLET BY MOUTH TWICE DAILY, Disp: 180 Tab, Rfl: 0    glucose blood VI test strips (ACCU-CHEK BALBIR PLUS TEST STRP) strip, Use to check blood sugar 1 time daily. Dx code: E11.9, Disp: 100 Strip, Rfl: 11    linagliptin (TRADJENTA) 5 mg tablet, Take 1 Tab by mouth daily. , Disp: 30 Tab, Rfl: 11    tamsulosin (FLOMAX) 0.4 mg capsule, Take 1 Cap by mouth daily. , Disp: 90 Cap, Rfl: 3    pravastatin (PRAVACHOL) 80 mg tablet, Take 1 Tab by mouth daily. , Disp: 90 Tab, Rfl: 3    lisinopril (PRINIVIL, ZESTRIL) 5 mg tablet, Take 1 Tab by mouth daily. , Disp: 90 Tab, Rfl: 3    triamterene-hydroCHLOROthiazide (MAXZIDE-25MG) 37.5-25 mg per tablet, Take 1 Tab by mouth daily. , Disp: 90 Tab, Rfl: 3    fluticasone (FLONASE) 50 mcg/actuation nasal spray, 2 Sprays by Both Nostrils route daily. , Disp: 1 Bottle, Rfl: 3    multivitamin (ONE A DAY) tablet, Take 1 Tab by mouth daily. , Disp: , Rfl:     aspirin delayed-release 81 mg tablet, Take  by mouth daily. , Disp: , Rfl:     cholecalciferol, VITAMIN D3, (VITAMIN D3) 5,000 unit tab tablet, Take  by mouth daily. , Disp: , Rfl:     cpap machine kit, by Does Not Apply route. autopap 7-20, Disp: , Rfl:      Date Last Reviewed:  11/2/2017   Number of Personal Factors/Comorbidities that affect the Plan of Care: 3+: HIGH COMPLEXITY   EXAMINATION:   Observation/Orthostatic Postural Assessment:          Very large girth and uses cane to ambulate. Uses cane in R hand---has been using for a long time. Palpation:          No tenderness to low back with palpation.     ROM:            AROM/PROM         Joint: Eval Date:  Re-Assess Date:  Re-Assess Date:    Active ROM RIGHT LEFT RIGHT LEFT RIGHT LEFT   Knee Extension [facility] WFL       Knee Flexion [facility] WFL       Hip Flexion Limited Limited       Hip Abduction [facility]       Lumbar Flexion 6 in from floor tips of fingers        Lumbar Extension 80% with no pain        Lumbar Side-bending 100% with no pain, no pain with % with no pain, no pain with OP       Lumbar Rotation 100% with no pain, no pain with % with no pain, no pain with OP         Strength:     Rj Date:  Re-Assess Date:  Re-Assess Date:      RIGHT LEFT RIGHT LEFT RIGHT LEFT   Knee Flexion 4/5 4/5       Knee Extension 4+/5 4+/5       Hip Flexion 4/5 4/5       Hip Abduction 5/5 5/5       Ankle Dorsiflexion 5/5 5/5                           Single leg stance right/left: 2 seconds bilaterally with eyes open.                Deep squat: Difficulty performing secondary to balance deficits    Ham 90/90:   RIGHT LE: 35 deg   LEFT LE: 40 deg    Special Tests: Assessed @ Initial Visit    CELIA 4: Negative   SLUMP TEST:  Negative  Neurological Screen: Assessed @ Initial Visit    RADIATING SYMPTOMS?: YES/NO---None, localized pain. Functional Mobility:  Assessed @ Initial Visit Affecting participation in basic ADLs and functional tasks. Balance:          Medial to lateral sway with ambulation--uses cane and has overall fair balance. Body Structures Involved:  1. Bones  2. Joints  3. Muscles  4. Ligaments Body Functions Affected:  1. Sensory/Pain  2. Neuromusculoskeletal  3. Movement Related Activities and Participation Affected:  1. Mobility  2. Self Care   Number of elements that affect the Plan of Care: 4+: HIGH COMPLEXITY   CLINICAL PRESENTATION:   Presentation: Evolving clinical presentation with changing clinical characteristics: MODERATE COMPLEXITY   CLINICAL DECISION MAKING:   Outcome Measure: Tool Used: Modified Oswestry Low Back Pain Questionnaire  Score:  Initial: 23/50  Most Recent: X/50 (Date: -- )   Interpretation of Score: Each section is scored on a 0-5 scale, 5 representing the greatest disability. The scores of each section are added together for a total score of 50. Score 0 1-10 11-20 21-30 31-40 41-49 50   Modifier CH CI CJ CK CL CM CN     ?  Mobility - Walking and Moving Around:     - CURRENT STATUS: CK - 40%-59% impaired, limited or restricted    - GOAL STATUS: CJ - 20%-39% impaired, limited or restricted    - D/C STATUS:  ---------------To be determined---------------    Medical Necessity:   · Skilled intervention continues to be required due to above deficits affecting participation in basic ADLs and overall functional tolerance. Reason for Services/Other Comments:  · Patient continues to require skilled intervention due to  above deficits affecting participation in basic ADLs and overall functional tolerance. Use of outcome tool(s) and clinical judgement create a POC that gives a: Clear prediction of patient's progress: LOW COMPLEXITY   TREATMENT:   (In addition to Assessment/Re-Assessment sessions the following treatments were rendered)      THERAPEUTIC EXERCISE: (38 minutes):  Exercises per grid below to improve mobility, strength and balance. Required minimal visual and verbal cues to promote proper body alignment and promote proper body posture. Progressed resistance, range and complexity of movement as indicated. Date:  10/24/17 Date:  10/27/17   Date:  11/1/17   Activity/Exercise Parameters Parameters Parameters   Hamstring Stretch 30\"x3 30\"X3    LTR 10\"X10 10\"X10 10\"X10   Bridging 20 25 Tilt then lift 10x3     SKTC      Hookyling hip abduction Green 2x 10 Blue 25    NuStep  5 minute level 3 5 minute level 4   Standing marching  20 25x with cues for tilt   Standing heel raises  NT NT Cant do   Gastroc Stretch  30\"X3    Toe Taps standing      Rows  GREEN 20X Green 25x   Tilt   10\"x10   SLS   30\"X3 standing   Heel and elbow iso   10x5\"   SLR with tilt   10x2                           MANUAL THERAPY: (2 minutes): Joint mobilization, Soft tissue mobilization and Manipulation was utilized and necessary because of the patient's restricted joint motion, painful spasm, restricted motion of soft tissue. (Used abbreviations: MET - muscle energy technique; PNF - proprioceptive neuromuscular facilitation; NMR - neuromuscular re-education; AP - anterior to posterior; PA - posterior to anterior)  manual stretching B hamstrings .       MODALITIES: (None minutes):               Treatment/Session Assessment:  Cheryl Marie verbalized understanding of role of PT and POC. Continued exercises. No soreness, but mentioned slight discomfort to anterior hip but unrelated to anything in particular and went away today. Discomfort was only 1 day in duration. Has been doing exercises at home. Educated on core tightening and tilt--provided with SLR and tilt HEP. · Pain/ Symptoms: Initial:   0/10--if standing >10 minutes he will get a dull ache to middle lower back. Post Session:  0/10 ·   Compliance with Program/Exercises: Will assess as treatment progresses. · Recommendations/Intent for next treatment session: \"Next visit will focus on advancements to more challenging activities\".     Future Appointments  Date Time Provider Chelita aMrinoi   11/7/2017 10:00 AM Lynn Carcamo DPT [facility]   11/16/2017 10:00 AM [facility]   1/11/2018 8:30 AM [facility]   1/18/2018 9:15 AM Maycol Luciano MD [facility]       Total Treatment Duration:  PT Patient Time In/Time Out  Time In: 1100  Time Out: [address] [facility], [place]

## 2017-11-02 ENCOUNTER — HOSPITAL ENCOUNTER (OUTPATIENT)
Dept: PHYSICAL THERAPY | Age: 77
Discharge: HOME | End: 2017-11-02
Attending: INTERNAL MEDICINE
Payer: MEDICARE

## 2017-11-02 PROCEDURE — 97110 THERAPEUTIC EXERCISES: CPT

## 2017-11-07 ENCOUNTER — HOSPITAL ENCOUNTER (OUTPATIENT)
Dept: PHYSICAL THERAPY | Age: 77
Discharge: HOME | End: 2017-11-07
Attending: INTERNAL MEDICINE
Payer: MEDICARE

## 2017-11-07 PROCEDURE — 97110 THERAPEUTIC EXERCISES: CPT

## 2017-11-07 NOTE — PROGRESS NOTES
Andrew Starr  : 1940 [id number] at [address], [address] [place] [address], [facility], [address] [place] [address]  [phone]   [phone]       OUTPATIENT PHYSICAL THERAPY:Daily Note, Treatment Day: 4th and AM 2017    ICD-10: Treatment Diagnosis:   Low back pain (M54.5)        Muscle weakness (generalized) (M62.81)   Unsteadiness on feet (R26.81)             Precautions/Allergies:   Review of patient's allergies indicates no known allergies. Fall Risk Score: 1 (? 5 = High Risk)  MD Orders: Eval and Treat ---Address strengthening and gait  MEDICAL/REFERRING DIAGNOSIS:  Low back pain [M54.5]   DATE OF ONSET: Chronic (exacerbated the last couple of months). REFERRING PHYSICIAN: Juan F Simental*  RETURN PHYSICIAN APPOINTMENT: TBD by patient if needed. INITIAL ASSESSMENT:  Mr. Andrew Starr has attended 1 physical therapy session including initial evaluation. He has localized back pain that is associated with spinal stenosis (flexion prefererence)   Andrew Starr exhibits decreased flexibility, decreased balance, increased pain (endurance related), decreased postural and core strength, decreased functional tolerance. No pelvic malalignment upon initial evaluation but decreased posture. Pain is endurance in nature as he is deconditioned. Hx of Laminectomy 2016. Andrew Starr will benefit from skilled PT (medically necessary) to address above deficits affecting participation in basic ADLs and overall functional tolerance. Manual techniques (stretching, joint mobilizations, soft tissue mobilization/myofascial release), postural exercises/education, therapeutic techniques/activities, and HEP will be performed as appropriate addressing Kyle Carolina's current condition. PROBLEM LIST (Impacting functional limitations):  1. Decreased Strength  2. Decreased ADL/Functional Activities  3. Decreased Transfer Abilities  4.  Decreased Ambulation Ability/Technique  5. Decreased Balance  6. Increased Pain  7. Decreased Activity Tolerance  8. Increased Fatigue  9. Increased Shortness of Breath  10. Decreased Flexibility/Joint Mobility  11. Decreased [place] with Home Exercise Program INTERVENTIONS PLANNED:  1. Balance Exercise  2. Bed Mobility  3. Cold  4. Cryotherapy  5. Electrical Stimulation  6. Family Education  7. Gait Training  8. Heat  9. Home Exercise Program (HEP)  10. Manual Therapy  11. Neuromuscular Re-education/Strengthening  12. Range of Motion (ROM)  13. Therapeutic Activites  14. Therapeutic Exercise/Strengthening  15. Transfer Training   TREATMENT PLAN:  Effective Dates: 10/23/17 TO 1/23/18. Frequency/Duration: 1 times a week for 12 weeks --patient requested to come 1x a week--will adjust if needed to meet goals. GOALS: (Goals have been discussed and agreed upon with patient.)  Short Term Goals 4 weeks   1. Harvey Hartman will be independent with HEP  2. Harvey Hartman will participate in LE stretching program to increase flexibility  3. Harvey Hartman will participate in core stabilization exercises to help with stabilization during ADLs  4. Harvey Hartman will participate in LE strengthening program with weights as appropriate to help with gait and elevations  5. Harvey Hartman will participate in static and dynamic balance activities to decrease the risk for falls  6. Harvey Hartman will be able to stand > 10 minutes with <=2/10 pain and minima to no difficulty to wash dishes and perform household ADLs. 7.   Long Term Goals 8 weeks   1. Harvey Hartman will demonstrate a 10 point improvement on the Oswestry to show improvement in function  2. Harvey Hartman will report 0/10 pain at rest and during ADLs  3. Harvey Hartman will demonstrate 5/5 LE strength on manual muscle testing  4.  Harvey Hartman will be able to perform SLS >5 seconds bilaterally to help with gait and improve balance  Rehabilitation Potential For Stated Goals: Good  Regarding Kyle Carolina's therapy, I certify that the treatment plan above will be carried out by a therapist or under their direction. Thank you for this referral,  Jacques Cristina DPT     Referring Physician Signature: Mal Millan*              Date                    HISTORY:   History of Present Injury/Illness (Reason for Referral): I am having lower back pain. I had spinal stenosis for 10+ years and spinal surgery last year (laminectomy). Now, my lower back is bothering me. He has pain with standing a few minutes. · Aggravating factors: Standing > 10 minutes. Walking > 10 minutes. · Relieving factors: Sitting. Per chart review 10.17.17:     Patient complains of gradually worsening of lower back pain for last several month. He states that the pain is occurring in the area below where he had his previous back surgery. Pain is aggravated when he stands for a long time for example when he is washing dishes. Pain is relieved by sitting and by using tramadol. He describes the pain as a scale of 6 out of 10 at its worst .  Tramadol tends to remove the pain. Pain is nonradiating. Pain does not interfere with sleep. Since his laminectomy in April 2016 he has regained some strength in his legs and denies any worsening of leg strength. He has used long-term tramadol for this pain before. He had some medications left over and he has been using it occasionally over the last 2 days to have relief of pain. Tylenol did not relieve the pain. He wants to avoid nonsteroidals because of concerns about its effect on the kidney. He denies any recent trauma. There has been no fevers or chills. He has asked for refill of tramadol for the pain. Past Medical History/Comorbidities:   Mr. Chao Records  has a past medical history of Arthritis (6/23/2015); BDR (background diabetic retinopathy) ([facility] (6/23/2015); Depression (6/23/2015);  Diabetes mellitus type 2, controlled ([address] Diabetic retinopathy ([address] (6/23/2015); Heartburn (6/23/2015); Hyperlipidemia (6/23/2015); Right-sided heart failure; Sleep apnea; Spinal stenosis; Spinal stenosis of lumbar region with radiculopathy (10/17/2017); Status post laminectomy (10/17/2017); and Type II or unspecified type diabetes mellitus without mention of complication, not stated as uncontrolled. Mr. Kristina Wallace  has a past surgical history that includes cyst removal; cyst removal; heent (Right, 2014); orthopaedic (04/05/2016); cataract removal (Left); and colonoscopy (10/10/2017). Social History/Living Environment:        Social History     Social History    Marital status:      Spouse name: N/A    Number of children: N/A    Years of education: N/A     Occupational History    Not on file. Social History Main Topics    Smoking status: Never Smoker    Smokeless tobacco: Never Used    Alcohol use No    Drug use: No    Sexual activity: No     Other Topics Concern    Not on file     Social History Narrative     Prior Level of Function/Work/Activity:  Retired.       Active Ambulatory Problems     Diagnosis Date Noted    Obstructive sleep apnea (adult) (pediatric) 03/19/2015    Obesity 03/19/2015    Hypertension 03/19/2015    Lower extremity edema 03/19/2015    Gastroesophageal reflux disease 03/19/2015    Benign prostatic hyperplasia 03/19/2015    CKD (chronic kidney disease) 02/25/2016    Weakness of lower extremity 03/03/2016    Controlled type 2 diabetes mellitus with complication, without long-term current use of insulin ([address] 10/20/2016    Background diabetic retinopathy ([address] 11/09/2016    Bilateral edema of lower extremity 11/12/2016    Chronic midline low back pain without sciatica 10/17/2017    Status post laminectomy 10/17/2017    Spinal stenosis of lumbar region with radiculopathy 10/17/2017    Hematuria 10/17/2017     Resolved Ambulatory Problems     Diagnosis Date Noted    Hypoxemia 03/19/2015    Shortness of breath 03/19/2015    Dyslipidemia 03/19/2015    Right-sided heart failure 03/19/2015    Diabetes ([facility] [address] 03/19/2015    FH: BPH (benign prostatic hypertrophy) 03/19/2015    Hyperlipidemia 06/23/2015    Diabetic retinopathy ([address] 06/23/2015    Spinal stenosis     Weakness 03/03/2016    Diabetes mellitus type II, controlled ([address] 07/21/2016    Cough 07/21/2016    Controlled type 2 diabetes mellitus without complication, without long-term current use of insulin ([address] 11/12/2016    Type 2 diabetes mellitus without complication ([address] 04/08/6692     Past Medical History:   Diagnosis Date    Arthritis 6/23/2015    BDR (background diabetic retinopathy) ([facility] [address] 6/23/2015    Depression 6/23/2015    Diabetes mellitus type 2, controlled ([address]     Diabetic retinopathy ([facility] [address] 6/23/2015    Heartburn 6/23/2015    Hyperlipidemia 6/23/2015    Right-sided heart failure     Sleep apnea     Spinal stenosis     Spinal stenosis of lumbar region with radiculopathy 10/17/2017    Status post laminectomy 10/17/2017    Type II or unspecified type diabetes mellitus without mention of complication, not stated as uncontrolled      Note: Patient denies any increase of symptoms with cough, sneeze or valsalva. Patient denies any saddle paresthesia or bowel/bladder deficits. Personal Factors:          Sex:  male        Age:  78 y.o.     Current Medications:    Current Outpatient Prescriptions:     traMADol (ULTRAM) 50 mg tablet, Take 2 tabs bid prn for back pain  Indications: Pain, Disp: 30 Tab, Rfl: 0    omeprazole (PRILOSEC) 20 mg capsule, TAKE ONE CAPSULE BY MOUTH TWICE DAILY, Disp: 180 Cap, Rfl: 3    metFORMIN (GLUCOPHAGE) 500 mg tablet, TAKE TWO TABLETS BY MOUTH IN THE MORNING AND TWO IN THE EVENING, Disp: 120 Tab, Rfl: 11    sulindac (CLINORIL) 200 mg tablet, TAKE ONE TABLET BY MOUTH TWICE DAILY, Disp: 180 Tab, Rfl: 0    glucose blood VI test strips (ACCU-CHEK BALBIR PLUS TEST STRP) strip, Use to check blood sugar 1 time daily. Dx code: E11.9, Disp: 100 Strip, Rfl: 11    linagliptin (TRADJENTA) 5 mg tablet, Take 1 Tab by mouth daily. , Disp: 30 Tab, Rfl: 11    tamsulosin (FLOMAX) 0.4 mg capsule, Take 1 Cap by mouth daily. , Disp: 90 Cap, Rfl: 3    pravastatin (PRAVACHOL) 80 mg tablet, Take 1 Tab by mouth daily. , Disp: 90 Tab, Rfl: 3    lisinopril (PRINIVIL, ZESTRIL) 5 mg tablet, Take 1 Tab by mouth daily. , Disp: 90 Tab, Rfl: 3    triamterene-hydroCHLOROthiazide (MAXZIDE-25MG) 37.5-25 mg per tablet, Take 1 Tab by mouth daily. , Disp: 90 Tab, Rfl: 3    fluticasone (FLONASE) 50 mcg/actuation nasal spray, 2 Sprays by Both Nostrils route daily. , Disp: 1 Bottle, Rfl: 3    multivitamin (ONE A DAY) tablet, Take 1 Tab by mouth daily. , Disp: , Rfl:     aspirin delayed-release 81 mg tablet, Take  by mouth daily. , Disp: , Rfl:     cholecalciferol, VITAMIN D3, (VITAMIN D3) 5,000 unit tab tablet, Take  by mouth daily. , Disp: , Rfl:     cpap machine kit, by Does Not Apply route. autopap 7-20, Disp: , Rfl:      Date Last Reviewed:  11/7/2017   Number of Personal Factors/Comorbidities that affect the Plan of Care: 3+: HIGH COMPLEXITY   EXAMINATION:   Observation/Orthostatic Postural Assessment:          Very large girth and uses cane to ambulate. Uses cane in R hand---has been using for a long time. Palpation:          No tenderness to low back with palpation.     ROM:            AROM/PROM         Joint: Eval Date:  Re-Assess Date:  Re-Assess Date:    Active ROM RIGHT LEFT RIGHT LEFT RIGHT LEFT   Knee Extension [facility] WFL       Knee Flexion [facility] WFL       Hip Flexion Limited Limited       Hip Abduction [facility]       Lumbar Flexion 6 in from floor tips of fingers        Lumbar Extension 80% with no pain        Lumbar Side-bending 100% with no pain, no pain with % with no pain, no pain with OP       Lumbar Rotation 100% with no pain, no pain with % with no pain, no pain with OP         Strength:     Rj Date:  Re-Assess Date:  Re-Assess Date:      RIGHT LEFT RIGHT LEFT RIGHT LEFT   Knee Flexion 4/5 4/5       Knee Extension 4+/5 4+/5       Hip Flexion 4/5 4/5       Hip Abduction 5/5 5/5       Ankle Dorsiflexion 5/5 5/5                           Single leg stance right/left: 2 seconds bilaterally with eyes open.                Deep squat: Difficulty performing secondary to balance deficits    Ham 90/90:   RIGHT LE: 35 deg   LEFT LE: 40 deg    Special Tests: Assessed @ Initial Visit    CELIA 4: Negative   SLUMP TEST:  Negative  Neurological Screen: Assessed @ Initial Visit    RADIATING SYMPTOMS?: YES/NO---None, localized pain. Functional Mobility:  Assessed @ Initial Visit Affecting participation in basic ADLs and functional tasks. Balance:          Medial to lateral sway with ambulation--uses cane and has overall fair balance. Body Structures Involved:  1. Bones  2. Joints  3. Muscles  4. Ligaments Body Functions Affected:  1. Sensory/Pain  2. Neuromusculoskeletal  3. Movement Related Activities and Participation Affected:  1. Mobility  2. Self Care   Number of elements that affect the Plan of Care: 4+: HIGH COMPLEXITY   CLINICAL PRESENTATION:   Presentation: Evolving clinical presentation with changing clinical characteristics: MODERATE COMPLEXITY   CLINICAL DECISION MAKING:   Outcome Measure: Tool Used: Modified Oswestry Low Back Pain Questionnaire  Score:  Initial: 23/50  Most Recent: X/50 (Date: -- )   Interpretation of Score: Each section is scored on a 0-5 scale, 5 representing the greatest disability. The scores of each section are added together for a total score of 50. Score 0 1-10 11-20 21-30 31-40 41-49 50   Modifier CH CI CJ CK CL CM CN     ?  Mobility - Walking and Moving Around:     - CURRENT STATUS: CK - 40%-59% impaired, limited or restricted    - GOAL STATUS: CJ - 20%-39% impaired, limited or restricted    - D/C STATUS:  ---------------To be determined---------------    Medical Necessity:   · Skilled intervention continues to be required due to above deficits affecting participation in basic ADLs and overall functional tolerance. Reason for Services/Other Comments:  · Patient continues to require skilled intervention due to  above deficits affecting participation in basic ADLs and overall functional tolerance. Use of outcome tool(s) and clinical judgement create a POC that gives a: Clear prediction of patient's progress: LOW COMPLEXITY   TREATMENT:   (In addition to Assessment/Re-Assessment sessions the following treatments were rendered)      THERAPEUTIC EXERCISE: (38 minutes):  Exercises per grid below to improve mobility, strength and balance. Required minimal visual and verbal cues to promote proper body alignment and promote proper body posture. Progressed resistance, range and complexity of movement as indicated. Date:  10/24/17 Date:  10/27/17   Date:  11/1/17 Date:  11/7/17   Activity/Exercise Parameters Parameters  Parameters   Hamstring Stretch 30\"x3 30\"X3     LTR 10\"X10 10\"X10 10X10\" 10\"X10   Bridging 20 25 10X3 Tilt then lift 10x3     SKTC       Hookyling hip abduction Green 2x 10 Blue 25     NuStep  5 minute level 3 5 minute level 4 6 minute level 4   Standing marching  20 25x with cues for tilt 25x with cues for tilt   Gastroc Stretch  30\"X3  30\"X3   Toe Taps standing       Rows  GREEN 20X g 25 Green 25x   Tilt   10x10\" 10\"x10   SLS   30\"x3  30\"X3 standing   Heel and elbow iso   10x5\" 10x5\"   SLR with tilt   10x2 10x2                              MANUAL THERAPY: (5 minutes): Joint mobilization, Soft tissue mobilization and Manipulation was utilized and necessary because of the patient's restricted joint motion, painful spasm, restricted motion of soft tissue.    (Used abbreviations: MET - muscle energy technique; PNF - proprioceptive neuromuscular facilitation; NMR - neuromuscular re-education; AP - anterior to posterior; PA - posterior to anterior)  manual stretching B hamstrings and piriformis. MODALITIES: (None minutes):               Treatment/Session Assessment:  Reyna Valencia states that he has some pain going down bilateral legs which is new---but the pain is was having previously to his back really he has not noticed. Continued exercises but did not add any further exercises---addressed tightness to relieve scatica-type pain (does not pass knee). Encouraged stretching at home. Pain in legs went away with standing tilts. · Pain/ Symptoms: Initial:   0/10--if standing >10 minutes he will get a dull ache to middle lower back. Post Session:  0/10 ·   Compliance with Program/Exercises: Will assess as treatment progresses. · Recommendations/Intent for next treatment session: \"Next visit will focus on advancements to more challenging activities\".     Future Appointments  Date Time Provider Chelita Marinoi   11/7/2017 10:00 AM Bradley Godwin DPT [facility]   11/16/2017 10:00 AM [facility]   1/11/2018 8:30 AM [facility]   1/18/2018 9:15 AM Laine Varner MD [facility]       Total Treatment Duration:  PT Patient Time In/Time Out  Time In: 1000  Time Out: [id number] [address] Derek Alexander DPT

## 2017-11-16 ENCOUNTER — HOSPITAL ENCOUNTER (OUTPATIENT)
Dept: PHYSICAL THERAPY | Age: 77
Discharge: HOME | End: 2017-11-16
Attending: INTERNAL MEDICINE
Payer: MEDICARE

## 2017-11-16 PROCEDURE — 97110 THERAPEUTIC EXERCISES: CPT

## 2017-11-16 NOTE — PROGRESS NOTES
Alem Terrazas  : 1940 [address] [facility] at [address] [place] [address] [address], [place], [address]  [phone]   [phone]       OUTPATIENT PHYSICAL THERAPY:Daily Note, Treatment Day:  and AMb 2017    ICD-10: Treatment Diagnosis:   Low back pain (M54.5)        Muscle weakness (generalized) (M62.81)   Unsteadiness on feet (R26.81)             Precautions/Allergies:   Review of patient's allergies indicates no known allergies. Fall Risk Score: 1 (? 5 = High Risk)  MD Orders: Eval and Treat ---Address strengthening and gait  MEDICAL/REFERRING DIAGNOSIS:  Low back pain [M54.5]   DATE OF ONSET: Chronic (exacerbated the last couple of months). REFERRING PHYSICIAN: Srinivasan Muniz*  RETURN PHYSICIAN APPOINTMENT: TBD by patient if needed. INITIAL ASSESSMENT:  Mr. Alem Terrazas has attended 1 physical therapy session including initial evaluation. He has localized back pain that is associated with spinal stenosis (flexion prefererence)   Alem Terrazas exhibits decreased flexibility, decreased balance, increased pain (endurance related), decreased postural and core strength, decreased functional tolerance. No pelvic malalignment upon initial evaluation but decreased posture. Pain is endurance in nature as he is deconditioned. Hx of Laminectomy 2016. Alem Terrazas will benefit from skilled PT (medically necessary) to address above deficits affecting participation in basic ADLs and overall functional tolerance. Manual techniques (stretching, joint mobilizations, soft tissue mobilization/myofascial release), postural exercises/education, therapeutic techniques/activities, and HEP will be performed as appropriate addressing Kyle Carolina's current condition. PROBLEM LIST (Impacting functional limitations):  1. Decreased Strength  2. Decreased ADL/Functional Activities  3. Decreased Transfer Abilities  4.  Decreased Ambulation Ability/Technique  5. Decreased Balance  6. Increased Pain  7. Decreased Activity Tolerance  8. Increased Fatigue  9. Increased Shortness of Breath  10. Decreased Flexibility/Joint Mobility  11. Decreased [place] with Home Exercise Program INTERVENTIONS PLANNED:  1. Balance Exercise  2. Bed Mobility  3. Cold  4. Cryotherapy  5. Electrical Stimulation  6. Family Education  7. Gait Training  8. Heat  9. Home Exercise Program (HEP)  10. Manual Therapy  11. Neuromuscular Re-education/Strengthening  12. Range of Motion (ROM)  13. Therapeutic Activites  14. Therapeutic Exercise/Strengthening  15. Transfer Training   TREATMENT PLAN:  Effective Dates: 10/23/17 TO 1/23/18. Frequency/Duration: 1 times a week for 12 weeks --patient requested to come 1x a week--will adjust if needed to meet goals. GOALS: (Goals have been discussed and agreed upon with patient.)  Short Term Goals 4 weeks   1. Lucian Tierney will be independent with HEP  2. Lucian Tierney will participate in LE stretching program to increase flexibility  3. Lucian Tierney will participate in core stabilization exercises to help with stabilization during ADLs  4. Lucian Tierney will participate in LE strengthening program with weights as appropriate to help with gait and elevations  5. Lucian Tierney will participate in static and dynamic balance activities to decrease the risk for falls  6. Lucian Tierney will be able to stand > 10 minutes with <=2/10 pain and minima to no difficulty to wash dishes and perform household ADLs. 7.   Long Term Goals 8 weeks   1. Lucian Tierney will demonstrate a 10 point improvement on the Oswestry to show improvement in function  2. Lucian Tierney will report 0/10 pain at rest and during ADLs  3. Lucian Tierney will demonstrate 5/5 LE strength on manual muscle testing  4.  Lucian Tierney will be able to perform SLS >5 seconds bilaterally to help with gait and improve balance  Rehabilitation Potential For Stated Goals: Good  Regarding Kyle Carolina's therapy, I certify that the treatment plan above will be carried out by a therapist or under their direction. Thank you for this referral,  Gadiel Pettit DPT     Referring Physician Signature: Babak Ferraro*              Date                    HISTORY:   History of Present Injury/Illness (Reason for Referral): I am having lower back pain. I had spinal stenosis for 10+ years and spinal surgery last year (laminectomy). Now, my lower back is bothering me. He has pain with standing a few minutes. · Aggravating factors: Standing > 10 minutes. Walking > 10 minutes. · Relieving factors: Sitting. Per chart review 10.17.17:     Patient complains of gradually worsening of lower back pain for last several month. He states that the pain is occurring in the area below where he had his previous back surgery. Pain is aggravated when he stands for a long time for example when he is washing dishes. Pain is relieved by sitting and by using tramadol. He describes the pain as a scale of 6 out of 10 at its worst .  Tramadol tends to remove the pain. Pain is nonradiating. Pain does not interfere with sleep. Since his laminectomy in April 2016 he has regained some strength in his legs and denies any worsening of leg strength. He has used long-term tramadol for this pain before. He had some medications left over and he has been using it occasionally over the last 2 days to have relief of pain. Tylenol did not relieve the pain. He wants to avoid nonsteroidals because of concerns about its effect on the kidney. He denies any recent trauma. There has been no fevers or chills. He has asked for refill of tramadol for the pain. Past Medical History/Comorbidities:   Mr. Nikky Quintero  has a past medical history of Arthritis (6/23/2015); BDR (background diabetic retinopathy) ([facility] (6/23/2015); Depression (6/23/2015);  Diabetes mellitus type 2, controlled ([address] Diabetic retinopathy ([address] (6/23/2015); Heartburn (6/23/2015); Hyperlipidemia (6/23/2015); Right-sided heart failure; Sleep apnea; Spinal stenosis; Spinal stenosis of lumbar region with radiculopathy (10/17/2017); Status post laminectomy (10/17/2017); and Type II or unspecified type diabetes mellitus without mention of complication, not stated as uncontrolled. Mr. Margarita Bae  has a past surgical history that includes cyst removal; cyst removal; heent (Right, 2014); orthopaedic (04/05/2016); cataract removal (Left); and colonoscopy (10/10/2017). Social History/Living Environment:        Social History     Social History    Marital status:      Spouse name: N/A    Number of children: N/A    Years of education: N/A     Occupational History    Not on file. Social History Main Topics    Smoking status: Never Smoker    Smokeless tobacco: Never Used    Alcohol use No    Drug use: No    Sexual activity: No     Other Topics Concern    Not on file     Social History Narrative     Prior Level of Function/Work/Activity:  Retired.       Active Ambulatory Problems     Diagnosis Date Noted    Obstructive sleep apnea (adult) (pediatric) 03/19/2015    Obesity 03/19/2015    Hypertension 03/19/2015    Lower extremity edema 03/19/2015    Gastroesophageal reflux disease 03/19/2015    Benign prostatic hyperplasia 03/19/2015    CKD (chronic kidney disease) 02/25/2016    Weakness of lower extremity 03/03/2016    Controlled type 2 diabetes mellitus with complication, without long-term current use of insulin ([address] 10/20/2016    Background diabetic retinopathy ([address] 11/09/2016    Bilateral edema of lower extremity 11/12/2016    Chronic midline low back pain without sciatica 10/17/2017    Status post laminectomy 10/17/2017    Spinal stenosis of lumbar region with radiculopathy 10/17/2017    Hematuria 10/17/2017     Resolved Ambulatory Problems     Diagnosis Date Noted    Hypoxemia 03/19/2015    Shortness of breath 03/19/2015    Dyslipidemia 03/19/2015    Right-sided heart failure 03/19/2015    Diabetes ([address] 03/19/2015    FH: BPH (benign prostatic hypertrophy) 03/19/2015    Hyperlipidemia 06/23/2015    Diabetic retinopathy ([address] 06/23/2015    Spinal stenosis     Weakness 03/03/2016    Diabetes mellitus type II, controlled ([address] 07/21/2016    Cough 07/21/2016    Controlled type 2 diabetes mellitus without complication, without long-term current use of insulin ([address] 11/12/2016    Type 2 diabetes mellitus without complication ([address] 13/95/4270     Past Medical History:   Diagnosis Date    Arthritis 6/23/2015    BDR (background diabetic retinopathy) ([facility] 6/23/2015    Depression 6/23/2015    Diabetes mellitus type 2, controlled ([address]     Diabetic retinopathy ([facility] 6/23/2015    Heartburn 6/23/2015    Hyperlipidemia 6/23/2015    Right-sided heart failure     Sleep apnea     Spinal stenosis     Spinal stenosis of lumbar region with radiculopathy 10/17/2017    Status post laminectomy 10/17/2017    Type II or unspecified type diabetes mellitus without mention of complication, not stated as uncontrolled      Note: Patient denies any increase of symptoms with cough, sneeze or valsalva. Patient denies any saddle paresthesia or bowel/bladder deficits. Personal Factors:          Sex:  male        Age:  78 y.o.     Current Medications:    Current Outpatient Prescriptions:     traMADol (ULTRAM) 50 mg tablet, Take 2 tabs bid prn for back pain  Indications: Pain, Disp: 30 Tab, Rfl: 0    omeprazole (PRILOSEC) 20 mg capsule, TAKE ONE CAPSULE BY MOUTH TWICE DAILY, Disp: 180 Cap, Rfl: 3    metFORMIN (GLUCOPHAGE) 500 mg tablet, TAKE TWO TABLETS BY MOUTH IN THE MORNING AND TWO IN THE EVENING, Disp: 120 Tab, Rfl: 11    sulindac (CLINORIL) 200 mg tablet, TAKE ONE TABLET BY MOUTH TWICE DAILY, Disp: 180 Tab, Rfl: 0    glucose blood VI test strips (ACCU-CHEK BALBIR PLUS TEST STRP) strip, Use to check blood sugar 1 time daily. Dx code: E11.9, Disp: 100 Strip, Rfl: 11    linagliptin (TRADJENTA) 5 mg tablet, Take 1 Tab by mouth daily. , Disp: 30 Tab, Rfl: 11    tamsulosin (FLOMAX) 0.4 mg capsule, Take 1 Cap by mouth daily. , Disp: 90 Cap, Rfl: 3    pravastatin (PRAVACHOL) 80 mg tablet, Take 1 Tab by mouth daily. , Disp: 90 Tab, Rfl: 3    lisinopril (PRINIVIL, ZESTRIL) 5 mg tablet, Take 1 Tab by mouth daily. , Disp: 90 Tab, Rfl: 3    triamterene-hydroCHLOROthiazide (MAXZIDE-25MG) 37.5-25 mg per tablet, Take 1 Tab by mouth daily. , Disp: 90 Tab, Rfl: 3    fluticasone (FLONASE) 50 mcg/actuation nasal spray, 2 Sprays by Both Nostrils route daily. , Disp: 1 Bottle, Rfl: 3    multivitamin (ONE A DAY) tablet, Take 1 Tab by mouth daily. , Disp: , Rfl:     aspirin delayed-release 81 mg tablet, Take  by mouth daily. , Disp: , Rfl:     cholecalciferol, VITAMIN D3, (VITAMIN D3) 5,000 unit tab tablet, Take  by mouth daily. , Disp: , Rfl:     cpap machine kit, by Does Not Apply route. autopap 7-20, Disp: , Rfl:      Date Last Reviewed:  11/16/2017   Number of Personal Factors/Comorbidities that affect the Plan of Care: 3+: HIGH COMPLEXITY   EXAMINATION:   Observation/Orthostatic Postural Assessment:          Very large girth and uses cane to ambulate. Uses cane in R hand---has been using for a long time. Palpation:          No tenderness to low back with palpation.     ROM:            AROM/PROM         Joint: Eval Date:  Re-Assess Date:  Re-Assess Date:    Active ROM RIGHT LEFT RIGHT LEFT RIGHT LEFT   Knee Extension [facility] WFL       Knee Flexion [facility] WFL       Hip Flexion Limited Limited       Hip Abduction [facility]       Lumbar Flexion 6 in from floor tips of fingers        Lumbar Extension 80% with no pain        Lumbar Side-bending 100% with no pain, no pain with % with no pain, no pain with OP       Lumbar Rotation 100% with no pain, no pain with % with no pain, no pain with OP         Strength:     Rj Date:  Re-Assess Date:  Re-Assess Date:      RIGHT LEFT RIGHT LEFT RIGHT LEFT   Knee Flexion 4/5 4/5       Knee Extension 4+/5 4+/5       Hip Flexion 4/5 4/5       Hip Abduction 5/5 5/5       Ankle Dorsiflexion 5/5 5/5                           Single leg stance right/left: 2 seconds bilaterally with eyes open.                Deep squat: Difficulty performing secondary to balance deficits    Ham 90/90:   RIGHT LE: 35 deg   LEFT LE: 40 deg    Special Tests: Assessed @ Initial Visit    CELIA 4: Negative   SLUMP TEST:  Negative  Neurological Screen: Assessed @ Initial Visit    RADIATING SYMPTOMS?: YES/NO---None, localized pain. Functional Mobility:  Assessed @ Initial Visit Affecting participation in basic ADLs and functional tasks. Balance:          Medial to lateral sway with ambulation--uses cane and has overall fair balance. Body Structures Involved:  1. Bones  2. Joints  3. Muscles  4. Ligaments Body Functions Affected:  1. Sensory/Pain  2. Neuromusculoskeletal  3. Movement Related Activities and Participation Affected:  1. Mobility  2. Self Care   Number of elements that affect the Plan of Care: 4+: HIGH COMPLEXITY   CLINICAL PRESENTATION:   Presentation: Evolving clinical presentation with changing clinical characteristics: MODERATE COMPLEXITY   CLINICAL DECISION MAKING:   Outcome Measure: Tool Used: Modified Oswestry Low Back Pain Questionnaire  Score:  Initial: 23/50  Most Recent: X/50 (Date: -- )   Interpretation of Score: Each section is scored on a 0-5 scale, 5 representing the greatest disability. The scores of each section are added together for a total score of 50. Score 0 1-10 11-20 21-30 31-40 41-49 50   Modifier CH CI CJ CK CL CM CN     ?  Mobility - Walking and Moving Around:     - CURRENT STATUS: CK - 40%-59% impaired, limited or restricted    - GOAL STATUS: CJ - 20%-39% impaired, limited or restricted    - D/C STATUS:  ---------------To be determined---------------    Medical Necessity:   · Skilled intervention continues to be required due to above deficits affecting participation in basic ADLs and overall functional tolerance. Reason for Services/Other Comments:  · Patient continues to require skilled intervention due to  above deficits affecting participation in basic ADLs and overall functional tolerance. Use of outcome tool(s) and clinical judgement create a POC that gives a: Clear prediction of patient's progress: LOW COMPLEXITY   TREATMENT:   (In addition to Assessment/Re-Assessment sessions the following treatments were rendered)      THERAPEUTIC EXERCISE: (45 minutes):  Exercises per grid below to improve mobility, strength and balance. Required minimal visual and verbal cues to promote proper body alignment and promote proper body posture. Progressed resistance, range and complexity of movement as indicated. Date:  10/24/17 Date:  10/27/17   Date:  11/1/17 Date:  11/7/17 Date:  11/16/17   Activity/Exercise Parameters Parameters  Parameters    Hamstring Stretch 30\"x3 30\"X3   30\"x3 manually by PT     LTR 10\"X10 10\"X10 10X10\" 10\"X10 10\"x10   Bridging 20 25 10X3 Tilt then lift 10x3   10x3   SKTC     30\"X3 manual by PT   Hookyling hip abduction Green 2x 10 Blue 25   Blue 30x   NuStep  5 minute level 3 5 minute level 4 6 minute level 4 6 minute level 5   Standing marching  20 25x with cues for tilt 25x with cues for tilt 25x with cues for tilt with wall behind him.      Gastroc Stretch  30\"X3  30\"X3    Toe Taps standing     10x standing   Rows  GREEN 20X g 25 Green 25x Blue 25x    Tilt   10x10\" 10\"x10 10\"x10   SLS   30\"x3  30\"X3 standing 30\"x3   Heel and elbow iso   10x5\" 10x5\"    SLR with tilt   10x2 10x2 10x2   Heel to toe walking     6x with CGA/Loco   Backwards and SS walking     8x with CGA/Loco   Standing hip abduction     2x10 with 1 UE support   Sit to stand     5x2   Rockerboard     10x                 MANUAL THERAPY: ( minutes): Joint mobilization, Soft tissue mobilization and Manipulation was utilized and necessary because of the patient's restricted joint motion, painful spasm, restricted motion of soft tissue. (Used abbreviations: MET - muscle energy technique; PNF - proprioceptive neuromuscular facilitation; NMR - neuromuscular re-education; AP - anterior to posterior; PA - posterior to anterior)  manual stretching B hamstrings and piriformis. MODALITIES: (None minutes):               Treatment/Session Assessment:  Abbey Coy is not having a lot of pain with walking. Performed more standing and balance activities. Pain does result after standing, but quickly dissipates with onset of tilt in standing or sitting. Frequent reminders for technique. Endurance is improving--balance still poor. · Pain/ Symptoms: Initial:   0/10-  Pain decreased to 0/10 after rest but then increases 3-4/10 with standing exercises. Post Session:  0/10 ·   Compliance with Program/Exercises: Will assess as treatment progresses. · Recommendations/Intent for next treatment session: \"Next visit will focus on advancements to more challenging activities\".     Future Appointments  Date Time Provider Chelita Marinoi   11/28/2017 11:00 AM France Koroma DPT [facility]   12/5/2017 10:00 AM CELSO Ramires [facility]   12/12/2017 10:00 AM CELSO Ramires [facility]   12/19/2017 10:00 AM CELSO Ramires [facility]   1/11/2018 8:30 AM [facility] WENDY [facility]   1/18/2018 9:15 AM Brian Gudino MD [facility]       Total Treatment Duration:  PT Patient Time In/Time Out  Time In: 1000  Time Out: [id number] [facility] Felicitas Whalen DPT

## 2017-11-28 ENCOUNTER — HOSPITAL ENCOUNTER (OUTPATIENT)
Dept: PHYSICAL THERAPY | Age: 77
Discharge: HOME | End: 2017-11-28
Attending: INTERNAL MEDICINE
Payer: MEDICARE

## 2017-11-28 NOTE — PROGRESS NOTES
Rian Fairchild  : 1940 [place] Shows at [facility] [address], [address] [place] [address], [address]  [phone]   [phone]       OUTPATIENT PHYSICAL THERAPY:Discharge 2017    ICD-10: Treatment Diagnosis:   Low back pain (M54.5)        Muscle weakness (generalized) (M62.81)   Unsteadiness on feet (R26.81)             Precautions/Allergies:   Review of patient's allergies indicates no known allergies. Fall Risk Score: 1 (? 5 = High Risk)  MD Orders: Eval and Treat ---Address strengthening and gait  MEDICAL/REFERRING DIAGNOSIS:  Low back pain [M54.5]   DATE OF ONSET: Chronic (exacerbated the last couple of months). REFERRING PHYSICIAN: Babak Ferraro*  RETURN PHYSICIAN APPOINTMENT: TBD by patient if needed. Mr. Rian Fairchild called and asked to cancel remaining sessions---he states that his pain began going down his legs bilaterally since last visit. I encouraged him to come in for PT to assist with pain; however he states he would like to hold off at this time--MD notified. At initial evaluation, he asked to only come one time a week; therefore we have only seen Rian Fairchild for 5 visits since initial evaluation on 10/24/17. Pain was improving and we began working on balance and strengthening in addition to flexibility and education of posture. Overall, he has very poor balance and is very deconditioned--this (balance/endurance) began to improve in weekly sessions. Compliance is likely fair-poor at home--notes that he is very sedentary. We will discharge his case at this time as he would like to examine other options for relief--states he did ask MD about Tramadol and informed physician of symptoms. Please let us know if we can be of any further assistance or if there are any questions. Thank you for this referral.           TREATMENT PLAN:  Effective Dates: 10/23/17 TO 18.   Frequency/Duration: 1 times a week for 12 weeks --patient requested to come 1x a week--will adjust if needed to meet goals. GOALS: (Goals have been discussed and agreed upon with patient.)    Short Term Goals 4 weeks   1. Steph Lennon will be independent with HEP  Goal Met 11/28/2017   2. Steph Lennon will participate in LE stretching program to increase flexibility  Goal met 11/28/2017   3. Steph Lennon will participate in core stabilization exercises to help with stabilization during ADLs  Not Met  4. Steph Lennon will participate in LE strengthening program with weights as appropriate to help with gait and elevations Not Met  5. Steph Lennon will participate in static and dynamic balance activities to decrease the risk for falls Not met/On going  6. Steph Lennon will be able to stand > 10 minutes with <=2/10 pain and minima to no difficulty to wash dishes and perform household ADLs. Not known at this time. 7.   Long Term Goals 8 weeks   UNKNOWN SECONDARY TO COMPLIANCE  1. Steph Lennon will demonstrate a 10 point improvement on the Oswestry to show improvement in function  2. Stpeh Lennon will report 0/10 pain at rest and during ADLs  3. Steph Lennon will demonstrate 5/5 LE strength on manual muscle testing  4. Steph Lennon will be able to perform SLS >5 seconds bilaterally to help with gait and improve balance  Rehabilitation Potential For Stated Goals: Good  Regarding Kyle Carolina's therapy, I certify that the treatment plan above will be carried out by a therapist or under their direction.   Thank you for this referral,  Jossy Aquino DPT     Referring Physician Signature: Francisca Nur*

## 2017-12-05 ENCOUNTER — APPOINTMENT (OUTPATIENT)
Dept: PHYSICAL THERAPY | Age: 77
End: 2017-12-05
Attending: INTERNAL MEDICINE

## 2017-12-12 ENCOUNTER — APPOINTMENT (OUTPATIENT)
Dept: PHYSICAL THERAPY | Age: 77
End: 2017-12-12
Attending: INTERNAL MEDICINE

## 2017-12-19 ENCOUNTER — APPOINTMENT (OUTPATIENT)
Dept: PHYSICAL THERAPY | Age: 77
End: 2017-12-19
Attending: INTERNAL MEDICINE

## 2017-12-20 PROBLEM — R29.6 FREQUENT FALLS: Status: ACTIVE | Noted: 2017-12-20

## 2017-12-20 PROBLEM — R26.89 LOSS OF BALANCE: Status: ACTIVE | Noted: 2017-12-20

## 2018-01-29 ENCOUNTER — HOSPITAL ENCOUNTER (OUTPATIENT)
Dept: MRI IMAGING | Age: 78
Discharge: HOME | End: 2018-01-29
Attending: OTOLARYNGOLOGY
Payer: MEDICARE

## 2018-01-29 DIAGNOSIS — H90.3 SENSORINEURAL HEARING LOSS, BILATERAL: ICD-10-CM

## 2018-01-29 PROCEDURE — 70553 MRI BRAIN STEM W/O & W/DYE: CPT

## 2018-01-29 PROCEDURE — 74011250636 HC RX REV CODE- 250/636: Performed by: OTOLARYNGOLOGY

## 2018-01-29 PROCEDURE — A9577 INJ MULTIHANCE: HCPCS | Performed by: OTOLARYNGOLOGY

## 2018-01-29 RX ORDER — SODIUM CHLORIDE 0.9 % (FLUSH) 0.9 %
10 SYRINGE (ML) INJECTION
Status: COMPLETED | OUTPATIENT
Start: 2018-01-29 | End: 2018-01-29

## 2018-01-29 RX ADMIN — Medication 10 ML: at 19:08

## 2018-01-29 RX ADMIN — GADOBENATE DIMEGLUMINE 20 ML: 529 INJECTION, SOLUTION INTRAVENOUS at 19:08

## 2018-05-03 ENCOUNTER — HOSPITAL ENCOUNTER (OUTPATIENT)
Dept: ULTRASOUND IMAGING | Age: 78
Discharge: HOME | End: 2018-05-03
Attending: INTERNAL MEDICINE
Payer: MEDICARE

## 2018-05-03 ENCOUNTER — HOSPITAL ENCOUNTER (OUTPATIENT)
Dept: GENERAL RADIOLOGY | Age: 78
Discharge: HOME | End: 2018-05-03
Attending: INTERNAL MEDICINE
Payer: MEDICARE

## 2018-05-03 ENCOUNTER — HOSPITAL ENCOUNTER (OUTPATIENT)
Dept: NUCLEAR MEDICINE | Age: 78
Discharge: HOME | End: 2018-05-03
Attending: INTERNAL MEDICINE
Payer: MEDICARE

## 2018-05-03 ENCOUNTER — HOSPITAL ENCOUNTER (OUTPATIENT)
Dept: CT IMAGING | Age: 78
Discharge: HOME | End: 2018-05-03
Attending: INTERNAL MEDICINE
Payer: MEDICARE

## 2018-05-03 DIAGNOSIS — R00.0 TACHYCARDIA: ICD-10-CM

## 2018-05-03 DIAGNOSIS — I82.402 ACUTE DEEP VEIN THROMBOSIS (DVT) OF LEFT LOWER EXTREMITY, UNSPECIFIED VEIN (HCC): ICD-10-CM

## 2018-05-03 DIAGNOSIS — R06.02 SHORTNESS OF BREATH: ICD-10-CM

## 2018-05-03 DIAGNOSIS — M79.89 LEFT LEG SWELLING: ICD-10-CM

## 2018-05-03 LAB
ALBUMIN SERPL-MCNC: 3.5 G/DL (ref 3.2–4.6)
ALBUMIN/GLOB SERPL: 1 {RATIO} (ref 1.2–3.5)
ALP SERPL-CCNC: 63 U/L (ref 50–136)
ALT SERPL-CCNC: 35 U/L (ref 12–65)
ANION GAP SERPL CALC-SCNC: 13 MMOL/L (ref 7–16)
AST SERPL-CCNC: 24 U/L (ref 15–37)
BASOPHILS # BLD: 0 K/UL (ref 0–0.2)
BASOPHILS NFR BLD: 0 % (ref 0–2)
BILIRUB SERPL-MCNC: 0.7 MG/DL (ref 0.2–1.1)
BUN SERPL-MCNC: 29 MG/DL (ref 8–23)
CALCIUM SERPL-MCNC: 9.2 MG/DL (ref 8.3–10.4)
CHLORIDE SERPL-SCNC: 99 MMOL/L (ref 98–107)
CO2 SERPL-SCNC: 27 MMOL/L (ref 21–32)
CREAT BLD-MCNC: 1.6 MG/DL (ref 0.8–1.5)
CREAT SERPL-MCNC: 1.64 MG/DL (ref 0.8–1.5)
DIFFERENTIAL METHOD BLD: ABNORMAL
EOSINOPHIL # BLD: 0.2 K/UL (ref 0–0.8)
EOSINOPHIL NFR BLD: 3 % (ref 0.5–7.8)
ERYTHROCYTE [DISTWIDTH] IN BLOOD BY AUTOMATED COUNT: 13.7 % (ref 11.9–14.6)
GLOBULIN SER CALC-MCNC: 3.5 G/DL (ref 2.3–3.5)
GLUCOSE SERPL-MCNC: 138 MG/DL (ref 65–100)
HCT VFR BLD AUTO: 43.2 % (ref 41.1–50.3)
HGB BLD-MCNC: 14 G/DL (ref 13.6–17.2)
IMM GRANULOCYTES # BLD: 0 K/UL (ref 0–0.5)
IMM GRANULOCYTES NFR BLD AUTO: 1 % (ref 0–5)
INR PPP: 1
LYMPHOCYTES # BLD: 1.6 K/UL (ref 0.5–4.6)
LYMPHOCYTES NFR BLD: 20 % (ref 13–44)
MCH RBC QN AUTO: 28.5 PG (ref 26.1–32.9)
MCHC RBC AUTO-ENTMCNC: 32.4 G/DL (ref 31.4–35)
MCV RBC AUTO: 87.8 FL (ref 79.6–97.8)
MONOCYTES # BLD: 0.4 K/UL (ref 0.1–1.3)
MONOCYTES NFR BLD: 5 % (ref 4–12)
NEUTS SEG # BLD: 5.7 K/UL (ref 1.7–8.2)
NEUTS SEG NFR BLD: 71 % (ref 43–78)
PLATELET # BLD AUTO: 155 K/UL (ref 150–450)
PMV BLD AUTO: 10.2 FL (ref 10.8–14.1)
POTASSIUM SERPL-SCNC: 3.9 MMOL/L (ref 3.5–5.1)
PROT SERPL-MCNC: 7 G/DL (ref 6.3–8.2)
PROTHROMBIN TIME: 13.2 SEC (ref 11.5–14.5)
RBC # BLD AUTO: 4.92 M/UL (ref 4.23–5.67)
SODIUM SERPL-SCNC: 139 MMOL/L (ref 136–145)
TSH SERPL DL<=0.005 MIU/L-ACNC: 0.97 UIU/ML (ref 0.36–3.74)
WBC # BLD AUTO: 7.9 K/UL (ref 4.3–11.1)

## 2018-05-03 PROCEDURE — 80053 COMPREHEN METABOLIC PANEL: CPT | Performed by: INTERNAL MEDICINE

## 2018-05-03 PROCEDURE — 82565 ASSAY OF CREATININE: CPT

## 2018-05-03 PROCEDURE — 85025 COMPLETE CBC W/AUTO DIFF WBC: CPT | Performed by: INTERNAL MEDICINE

## 2018-05-03 PROCEDURE — 71046 X-RAY EXAM CHEST 2 VIEWS: CPT

## 2018-05-03 PROCEDURE — 84443 ASSAY THYROID STIM HORMONE: CPT | Performed by: INTERNAL MEDICINE

## 2018-05-03 PROCEDURE — 93971 EXTREMITY STUDY: CPT

## 2018-05-03 PROCEDURE — 77030032008 NM LUNG PERFUSION W VENT

## 2018-05-03 PROCEDURE — 85610 PROTHROMBIN TIME: CPT | Performed by: INTERNAL MEDICINE

## 2018-05-03 RX ORDER — SODIUM CHLORIDE 0.9 % (FLUSH) 0.9 %
10 SYRINGE (ML) INJECTION
Status: ACTIVE | OUTPATIENT
Start: 2018-05-03 | End: 2018-05-03

## 2018-05-03 RX ORDER — SODIUM CHLORIDE 0.9 % (FLUSH) 0.9 %
10 SYRINGE (ML) INJECTION
Status: COMPLETED | OUTPATIENT
Start: 2018-05-03 | End: 2018-05-03

## 2018-05-03 RX ADMIN — Medication 10 ML: at 14:40

## 2018-11-15 PROBLEM — B96.5 BACTEREMIA DUE TO PSEUDOMONAS: Status: ACTIVE | Noted: 2018-11-15

## 2018-11-15 PROBLEM — I82.432 DEEP VEIN THROMBOSIS (DVT) OF POPLITEAL VEIN OF LEFT LOWER EXTREMITY (HCC): Status: ACTIVE | Noted: 2018-11-15

## 2018-11-15 PROBLEM — R78.81 BACTEREMIA DUE TO PSEUDOMONAS: Status: ACTIVE | Noted: 2018-11-15

## 2018-11-15 PROBLEM — Z86.19 HISTORY OF SEPSIS: Status: ACTIVE | Noted: 2018-11-15

## 2018-11-15 PROBLEM — I26.99 PULMONARY EMBOLISM ON LEFT (HCC): Status: ACTIVE | Noted: 2018-11-15

## 2019-05-17 PROBLEM — G47.33 OBSTRUCTIVE SLEEP APNEA: Status: ACTIVE | Noted: 2019-05-17

## 2019-05-17 PROBLEM — R26.2 DIFFICULTY IN WALKING: Status: ACTIVE | Noted: 2019-05-17

## 2019-09-05 ENCOUNTER — PATIENT OUTREACH (OUTPATIENT)
Dept: CASE MANAGEMENT | Age: 79
End: 2019-09-05

## 2019-09-05 NOTE — PROGRESS NOTES
CCM outreach to patient to introduce myself as Ambulatory  for [facility]. Discussed CCM services with patient. he declined at time of call stating Dr. Lucero Hong takes great care of his medical issues.  Yolande Chang did agree to notify PCP for referral to CCM if any needs arise. This note will not be viewable in [address]

## 2019-09-09 PROBLEM — R60.0 EDEMA EXTREMITIES: Status: ACTIVE | Noted: 2019-09-09
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
## 2019-09-09 PROBLEM — I51.7 VENTRICULAR ENLARGEMENT, RIGHT: Status: ACTIVE | Noted: 2019-09-09

## 2019-09-09 PROBLEM — I51.7 ATRIAL ENLARGEMENT, RIGHT: Status: ACTIVE | Noted: 2019-09-09

## 2019-09-09 PROBLEM — M54.9 BACK PAIN: Status: ACTIVE | Noted: 2018-04-14

## 2019-09-09 PROBLEM — G61.81 CIDP (CHRONIC INFLAMMATORY DEMYELINATING POLYNEUROPATHY) (HCC): Status: ACTIVE | Noted: 2018-09-26

## 2019-09-09 PROBLEM — Z86.718 HISTORY OF DEEP VENOUS THROMBOSIS (DVT) OF DISTAL VEIN OF LEFT LOWER EXTREMITY: Status: ACTIVE | Noted: 2018-11-15

## 2019-09-09 PROBLEM — R93.89 ABNORMAL CHEST CT: Status: ACTIVE | Noted: 2018-11-13

## 2019-09-09 PROBLEM — Z79.01 CHRONIC ANTICOAGULATION: Status: ACTIVE | Noted: 2019-01-16

## 2019-09-09 PROBLEM — R26.9 ABNORMAL GAIT: Status: ACTIVE | Noted: 2018-07-13

## 2019-09-09 PROBLEM — Z98.890 HX OF DECOMPRESSIVE LUMBAR LAMINECTOMY: Status: ACTIVE | Noted: 2018-09-26

## 2019-09-09 PROBLEM — Z86.711 HISTORY OF PULMONARY EMBOLISM: Status: ACTIVE | Noted: 2018-11-15

## 2019-09-09 PROBLEM — N20.1 URETERAL CALCULUS: Status: ACTIVE | Noted: 2018-04-18
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
## 2019-09-09 PROBLEM — F39 MOOD DISORDER (HCC): Status: ACTIVE | Noted: 2019-09-09

## 2020-01-24 PROBLEM — Z91.81 AT RISK FOR FALLS: Status: ACTIVE | Noted: 2020-01-24

## 2020-03-24 ENCOUNTER — HOSPICE ADMISSION (OUTPATIENT)
Dept: HOSPICE | Facility: HOSPICE | Age: 80
End: 2020-03-24
Payer: MEDICARE

## 2020-03-25 ENCOUNTER — HOME CARE VISIT (OUTPATIENT)
Dept: SCHEDULING | Facility: HOME HEALTH | Age: 80
End: 2020-03-25
Payer: MEDICARE

## 2020-03-25 VITALS
SYSTOLIC BLOOD PRESSURE: 128 MMHG | DIASTOLIC BLOOD PRESSURE: 70 MMHG | RESPIRATION RATE: 20 BRPM | HEART RATE: 70 BPM | TEMPERATURE: 98.9 F

## 2020-03-25 PROCEDURE — 3336500001 HSPC ELECTION

## 2020-03-25 PROCEDURE — HOSPICE MEDICATION HC HH HOSPICE MEDICATION

## 2020-03-25 PROCEDURE — G0299 HHS/HOSPICE OF RN EA 15 MIN: HCPCS

## 2020-03-25 PROCEDURE — 0651 HSPC ROUTINE HOME CARE

## 2020-03-26 ENCOUNTER — HOME CARE VISIT (OUTPATIENT)
Dept: HOSPICE | Facility: HOSPICE | Age: 80
End: 2020-03-26
Payer: MEDICARE

## 2020-03-26 ENCOUNTER — HOME CARE VISIT (OUTPATIENT)
Dept: SCHEDULING | Facility: HOME HEALTH | Age: 80
End: 2020-03-26
Payer: MEDICARE

## 2020-03-26 PROCEDURE — G0155 HHCP-SVS OF CSW,EA 15 MIN: HCPCS

## 2020-03-26 PROCEDURE — 0651 HSPC ROUTINE HOME CARE

## 2020-03-26 NOTE — PROGRESS NOTES
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 and 4 prn / 3 mo. for emotional/spiritual concerns/celebration

## 2020-03-27 PROCEDURE — 0651 HSPC ROUTINE HOME CARE

## 2020-03-28 PROCEDURE — 0651 HSPC ROUTINE HOME CARE

## 2020-03-29 PROCEDURE — 0651 HSPC ROUTINE HOME CARE

## 2020-03-30 ENCOUNTER — HOME CARE VISIT (OUTPATIENT)
Dept: SCHEDULING | Facility: HOME HEALTH | Age: 80
End: 2020-03-30
Payer: MEDICARE

## 2020-03-30 PROBLEM — Z51.5 HOSPICE CARE PATIENT: Status: ACTIVE | Noted: 2020-03-30

## 2020-03-30 PROCEDURE — 0651 HSPC ROUTINE HOME CARE

## 2020-03-30 PROCEDURE — G0156 HHCP-SVS OF AIDE,EA 15 MIN: HCPCS

## 2020-03-31 ENCOUNTER — HOME CARE VISIT (OUTPATIENT)
Dept: SCHEDULING | Facility: HOME HEALTH | Age: 80
End: 2020-03-31
Payer: MEDICARE

## 2020-03-31 PROCEDURE — G0299 HHS/HOSPICE OF RN EA 15 MIN: HCPCS

## 2020-03-31 PROCEDURE — HOSPICE MEDICATION HC HH HOSPICE MEDICATION

## 2020-03-31 PROCEDURE — 0651 HSPC ROUTINE HOME CARE

## 2020-04-01 ENCOUNTER — HOME CARE VISIT (OUTPATIENT)
Dept: SCHEDULING | Facility: HOME HEALTH | Age: 80
End: 2020-04-01
Payer: MEDICARE

## 2020-04-01 VITALS
DIASTOLIC BLOOD PRESSURE: 80 MMHG | RESPIRATION RATE: 16 BRPM | TEMPERATURE: 99 F | SYSTOLIC BLOOD PRESSURE: 135 MMHG | HEART RATE: 80 BPM

## 2020-04-01 PROCEDURE — 0651 HSPC ROUTINE HOME CARE

## 2020-04-02 ENCOUNTER — HOME CARE VISIT (OUTPATIENT)
Dept: SCHEDULING | Facility: HOME HEALTH | Age: 80
End: 2020-04-02
Payer: MEDICARE

## 2020-04-02 VITALS
HEART RATE: 76 BPM | SYSTOLIC BLOOD PRESSURE: 122 MMHG | TEMPERATURE: 97.8 F | DIASTOLIC BLOOD PRESSURE: 76 MMHG | RESPIRATION RATE: 16 BRPM

## 2020-04-02 PROCEDURE — 0651 HSPC ROUTINE HOME CARE

## 2020-04-02 PROCEDURE — G0299 HHS/HOSPICE OF RN EA 15 MIN: HCPCS

## 2020-04-03 ENCOUNTER — HOME CARE VISIT (OUTPATIENT)
Dept: SCHEDULING | Facility: HOME HEALTH | Age: 80
End: 2020-04-03
Payer: MEDICARE

## 2020-04-03 PROCEDURE — G0156 HHCP-SVS OF AIDE,EA 15 MIN: HCPCS

## 2020-04-03 PROCEDURE — 0651 HSPC ROUTINE HOME CARE

## 2020-04-04 PROCEDURE — 0651 HSPC ROUTINE HOME CARE

## 2020-04-05 PROCEDURE — 0651 HSPC ROUTINE HOME CARE

## 2020-04-06 ENCOUNTER — HOME CARE VISIT (OUTPATIENT)
Dept: SCHEDULING | Facility: HOME HEALTH | Age: 80
End: 2020-04-06
Payer: MEDICARE

## 2020-04-06 PROCEDURE — G0156 HHCP-SVS OF AIDE,EA 15 MIN: HCPCS

## 2020-04-06 PROCEDURE — 0651 HSPC ROUTINE HOME CARE

## 2020-04-07 PROCEDURE — 0651 HSPC ROUTINE HOME CARE

## 2020-04-08 ENCOUNTER — HOME CARE VISIT (OUTPATIENT)
Dept: SCHEDULING | Facility: HOME HEALTH | Age: 80
End: 2020-04-08
Payer: MEDICARE

## 2020-04-08 PROBLEM — I26.99 OTHER PULMONARY EMBOLISM WITHOUT ACUTE COR PULMONALE (HCC): Status: ACTIVE | Noted: 2018-11-13

## 2020-04-08 PROBLEM — M54.50 ACUTE LOW BACK PAIN DUE TO TRAUMA: Status: ACTIVE | Noted: 2020-01-27

## 2020-04-08 PROBLEM — I82.409 DVT (DEEP VENOUS THROMBOSIS) (HCC): Status: ACTIVE | Noted: 2018-06-12

## 2020-04-08 PROBLEM — G89.11 ACUTE LOW BACK PAIN DUE TO TRAUMA: Status: ACTIVE | Noted: 2020-01-27

## 2020-04-08 PROBLEM — S32.010A CLOSED COMPRESSION FRACTURE OF BODY OF L1 VERTEBRA (HCC): Status: ACTIVE | Noted: 2020-02-03

## 2020-04-08 PROBLEM — R26.81 UNSTEADY GAIT: Status: ACTIVE | Noted: 2020-02-24

## 2020-04-08 PROCEDURE — 0651 HSPC ROUTINE HOME CARE

## 2020-04-09 ENCOUNTER — HOME CARE VISIT (OUTPATIENT)
Dept: SCHEDULING | Facility: HOME HEALTH | Age: 80
End: 2020-04-09
Payer: MEDICARE

## 2020-04-09 VITALS
RESPIRATION RATE: 16 BRPM | SYSTOLIC BLOOD PRESSURE: 116 MMHG | DIASTOLIC BLOOD PRESSURE: 74 MMHG | TEMPERATURE: 98.2 F | HEART RATE: 80 BPM

## 2020-04-09 PROCEDURE — HOSPICE MEDICATION HC HH HOSPICE MEDICATION

## 2020-04-09 PROCEDURE — 0651 HSPC ROUTINE HOME CARE

## 2020-04-09 PROCEDURE — G0299 HHS/HOSPICE OF RN EA 15 MIN: HCPCS

## 2020-04-10 ENCOUNTER — HOME CARE VISIT (OUTPATIENT)
Dept: SCHEDULING | Facility: HOME HEALTH | Age: 80
End: 2020-04-10
Payer: MEDICARE

## 2020-04-10 ENCOUNTER — HOME CARE VISIT (OUTPATIENT)
Dept: HOSPICE | Facility: HOSPICE | Age: 80
End: 2020-04-10
Payer: MEDICARE

## 2020-04-10 PROCEDURE — 0651 HSPC ROUTINE HOME CARE

## 2020-04-10 NOTE — PROGRESS NOTES
Covid 19 televisit    S: patient Kyle's daughter Alfonso Alvares on phone; patient asleep  B: routine visit/assessment  A: daughter said patient is feeling significantly better since meds have been tweaked by Dr. Dariel Russ and that patient sleeps a lot; patient and dtr have made a list of things that need to to be taken care of - property disposition, will, final arrangements, etc.; daughter thanked  for visit  R: active listening; assurance  of care/availability;    Visit: 1 x / mo. and 4 prn / 3 mo. for emotional/spiritual concerns/celebration

## 2020-04-11 PROCEDURE — 0651 HSPC ROUTINE HOME CARE

## 2020-04-12 PROCEDURE — 0651 HSPC ROUTINE HOME CARE

## 2020-04-13 ENCOUNTER — HOME CARE VISIT (OUTPATIENT)
Dept: HOSPICE | Facility: HOSPICE | Age: 80
End: 2020-04-13
Payer: MEDICARE

## 2020-04-13 PROCEDURE — 0651 HSPC ROUTINE HOME CARE

## 2020-04-14 PROCEDURE — 0651 HSPC ROUTINE HOME CARE

## 2020-04-15 ENCOUNTER — HOME CARE VISIT (OUTPATIENT)
Dept: HOSPICE | Facility: HOSPICE | Age: 80
End: 2020-04-15
Payer: MEDICARE

## 2020-04-15 PROCEDURE — 0651 HSPC ROUTINE HOME CARE

## 2020-04-16 ENCOUNTER — HOME CARE VISIT (OUTPATIENT)
Dept: SCHEDULING | Facility: HOME HEALTH | Age: 80
End: 2020-04-16
Payer: MEDICARE

## 2020-04-16 VITALS
HEART RATE: 84 BPM | TEMPERATURE: 98.2 F | DIASTOLIC BLOOD PRESSURE: 80 MMHG | RESPIRATION RATE: 16 BRPM | SYSTOLIC BLOOD PRESSURE: 130 MMHG

## 2020-04-16 PROCEDURE — 0651 HSPC ROUTINE HOME CARE

## 2020-04-16 PROCEDURE — G0299 HHS/HOSPICE OF RN EA 15 MIN: HCPCS

## 2020-04-16 PROCEDURE — HOSPICE MEDICATION HC HH HOSPICE MEDICATION

## 2020-04-17 PROCEDURE — 0651 HSPC ROUTINE HOME CARE

## 2020-04-18 PROCEDURE — 0651 HSPC ROUTINE HOME CARE

## 2020-04-19 PROCEDURE — 0651 HSPC ROUTINE HOME CARE

## 2020-04-20 PROCEDURE — 0651 HSPC ROUTINE HOME CARE

## 2020-04-21 PROCEDURE — 0651 HSPC ROUTINE HOME CARE

## 2020-04-22 PROCEDURE — 0651 HSPC ROUTINE HOME CARE

## 2020-04-23 ENCOUNTER — HOME CARE VISIT (OUTPATIENT)
Dept: SCHEDULING | Facility: HOME HEALTH | Age: 80
End: 2020-04-23
Payer: MEDICARE

## 2020-04-23 VITALS
WEIGHT: 262.8 LBS | BODY MASS INDEX: 37.71 KG/M2 | RESPIRATION RATE: 18 BRPM | DIASTOLIC BLOOD PRESSURE: 70 MMHG | TEMPERATURE: 98.5 F | SYSTOLIC BLOOD PRESSURE: 130 MMHG | HEART RATE: 76 BPM

## 2020-04-23 PROCEDURE — G0299 HHS/HOSPICE OF RN EA 15 MIN: HCPCS

## 2020-04-23 PROCEDURE — HOSPICE MEDICATION HC HH HOSPICE MEDICATION

## 2020-04-23 PROCEDURE — 0651 HSPC ROUTINE HOME CARE

## 2020-04-24 ENCOUNTER — HOME CARE VISIT (OUTPATIENT)
Dept: HOSPICE | Facility: HOSPICE | Age: 80
End: 2020-04-24
Payer: MEDICARE

## 2020-04-24 PROCEDURE — 0651 HSPC ROUTINE HOME CARE

## 2020-04-25 PROCEDURE — 0651 HSPC ROUTINE HOME CARE

## 2020-04-26 PROCEDURE — 0651 HSPC ROUTINE HOME CARE

## 2020-04-27 PROCEDURE — 0651 HSPC ROUTINE HOME CARE

## 2020-04-28 PROCEDURE — 0651 HSPC ROUTINE HOME CARE

## 2020-04-29 PROCEDURE — 0651 HSPC ROUTINE HOME CARE

## 2020-04-30 ENCOUNTER — HOME CARE VISIT (OUTPATIENT)
Dept: SCHEDULING | Facility: HOME HEALTH | Age: 80
End: 2020-04-30
Payer: MEDICARE

## 2020-04-30 VITALS
TEMPERATURE: 98.6 F | SYSTOLIC BLOOD PRESSURE: 100 MMHG | HEART RATE: 84 BPM | DIASTOLIC BLOOD PRESSURE: 64 MMHG | RESPIRATION RATE: 16 BRPM

## 2020-04-30 PROCEDURE — HOSPICE MEDICATION HC HH HOSPICE MEDICATION

## 2020-04-30 PROCEDURE — G0299 HHS/HOSPICE OF RN EA 15 MIN: HCPCS

## 2020-04-30 PROCEDURE — 0651 HSPC ROUTINE HOME CARE

## 2020-05-01 PROCEDURE — 0651 HSPC ROUTINE HOME CARE

## 2020-05-02 PROCEDURE — 0651 HSPC ROUTINE HOME CARE

## 2020-05-03 PROCEDURE — 0651 HSPC ROUTINE HOME CARE

## 2020-05-04 PROCEDURE — 0651 HSPC ROUTINE HOME CARE

## 2020-05-05 ENCOUNTER — HOME CARE VISIT (OUTPATIENT)
Dept: HOSPICE | Facility: HOSPICE | Age: 80
End: 2020-05-05
Payer: MEDICARE

## 2020-05-05 PROCEDURE — 0651 HSPC ROUTINE HOME CARE

## 2020-05-06 PROCEDURE — 0651 HSPC ROUTINE HOME CARE

## 2020-05-07 ENCOUNTER — HOME CARE VISIT (OUTPATIENT)
Dept: HOSPICE | Facility: HOSPICE | Age: 80
End: 2020-05-07
Payer: MEDICARE

## 2020-05-07 ENCOUNTER — HOME CARE VISIT (OUTPATIENT)
Dept: SCHEDULING | Facility: HOME HEALTH | Age: 80
End: 2020-05-07
Payer: MEDICARE

## 2020-05-07 VITALS
TEMPERATURE: 98.6 F | SYSTOLIC BLOOD PRESSURE: 124 MMHG | RESPIRATION RATE: 16 BRPM | DIASTOLIC BLOOD PRESSURE: 70 MMHG | HEART RATE: 72 BPM

## 2020-05-07 PROCEDURE — 0651 HSPC ROUTINE HOME CARE

## 2020-05-07 PROCEDURE — G0299 HHS/HOSPICE OF RN EA 15 MIN: HCPCS

## 2020-05-07 PROCEDURE — HOSPICE MEDICATION HC HH HOSPICE MEDICATION

## 2020-05-08 ENCOUNTER — HOME CARE VISIT (OUTPATIENT)
Dept: SCHEDULING | Facility: HOME HEALTH | Age: 80
End: 2020-05-08
Payer: MEDICARE

## 2020-05-08 PROCEDURE — 0651 HSPC ROUTINE HOME CARE

## 2020-05-09 PROCEDURE — 0651 HSPC ROUTINE HOME CARE

## 2020-05-10 PROCEDURE — 0651 HSPC ROUTINE HOME CARE

## 2020-05-11 PROCEDURE — 0651 HSPC ROUTINE HOME CARE

## 2020-05-12 PROCEDURE — 0651 HSPC ROUTINE HOME CARE

## 2020-05-13 ENCOUNTER — HOME CARE VISIT (OUTPATIENT)
Dept: SCHEDULING | Facility: HOME HEALTH | Age: 80
End: 2020-05-13
Payer: MEDICARE

## 2020-05-13 VITALS
HEART RATE: 88 BPM | DIASTOLIC BLOOD PRESSURE: 70 MMHG | TEMPERATURE: 98.8 F | SYSTOLIC BLOOD PRESSURE: 120 MMHG | RESPIRATION RATE: 16 BRPM

## 2020-05-13 PROCEDURE — HOSPICE MEDICATION HC HH HOSPICE MEDICATION

## 2020-05-13 PROCEDURE — 0651 HSPC ROUTINE HOME CARE

## 2020-05-13 PROCEDURE — G0299 HHS/HOSPICE OF RN EA 15 MIN: HCPCS

## 2020-05-14 PROCEDURE — 0651 HSPC ROUTINE HOME CARE

## 2020-05-15 PROCEDURE — 0651 HSPC ROUTINE HOME CARE

## 2020-05-16 PROCEDURE — 0651 HSPC ROUTINE HOME CARE

## 2020-05-17 PROCEDURE — 0651 HSPC ROUTINE HOME CARE

## 2020-05-18 PROCEDURE — 0651 HSPC ROUTINE HOME CARE

## 2020-05-19 PROCEDURE — 0651 HSPC ROUTINE HOME CARE

## 2020-05-20 PROCEDURE — 0651 HSPC ROUTINE HOME CARE

## 2020-05-21 ENCOUNTER — HOME CARE VISIT (OUTPATIENT)
Dept: SCHEDULING | Facility: HOME HEALTH | Age: 80
End: 2020-05-21
Payer: MEDICARE

## 2020-05-21 VITALS
RESPIRATION RATE: 18 BRPM | SYSTOLIC BLOOD PRESSURE: 130 MMHG | HEART RATE: 88 BPM | TEMPERATURE: 98.9 F | DIASTOLIC BLOOD PRESSURE: 80 MMHG

## 2020-05-21 PROCEDURE — 0651 HSPC ROUTINE HOME CARE

## 2020-05-21 PROCEDURE — G0299 HHS/HOSPICE OF RN EA 15 MIN: HCPCS

## 2020-05-21 PROCEDURE — HOSPICE MEDICATION HC HH HOSPICE MEDICATION

## 2020-05-22 PROCEDURE — 0651 HSPC ROUTINE HOME CARE

## 2020-05-23 PROCEDURE — 0651 HSPC ROUTINE HOME CARE

## 2020-05-24 PROCEDURE — 0651 HSPC ROUTINE HOME CARE

## 2020-05-25 PROCEDURE — 0651 HSPC ROUTINE HOME CARE

## 2020-05-26 ENCOUNTER — HOME CARE VISIT (OUTPATIENT)
Dept: HOSPICE | Facility: HOSPICE | Age: 80
End: 2020-05-26
Payer: MEDICARE

## 2020-05-26 PROCEDURE — 0651 HSPC ROUTINE HOME CARE

## 2020-05-27 PROCEDURE — 0651 HSPC ROUTINE HOME CARE

## 2020-05-28 ENCOUNTER — HOSPITAL ENCOUNTER (OUTPATIENT)
Dept: LAB | Age: 80
Discharge: HOME | End: 2020-05-28
Payer: OTHER MISCELLANEOUS

## 2020-05-28 ENCOUNTER — HOME CARE VISIT (OUTPATIENT)
Dept: SCHEDULING | Facility: HOME HEALTH | Age: 80
End: 2020-05-28
Payer: MEDICARE

## 2020-05-28 VITALS
TEMPERATURE: 97.8 F | SYSTOLIC BLOOD PRESSURE: 120 MMHG | HEART RATE: 80 BPM | DIASTOLIC BLOOD PRESSURE: 64 MMHG | RESPIRATION RATE: 16 BRPM

## 2020-05-28 LAB
INR PPP: 1.8
PROTHROMBIN TIME: 21.7 SEC (ref 12–14.7)

## 2020-05-28 PROCEDURE — G0299 HHS/HOSPICE OF RN EA 15 MIN: HCPCS

## 2020-05-28 PROCEDURE — 85610 PROTHROMBIN TIME: CPT

## 2020-05-28 PROCEDURE — 0651 HSPC ROUTINE HOME CARE

## 2020-05-29 PROCEDURE — 0651 HSPC ROUTINE HOME CARE

## 2020-05-30 PROCEDURE — 0651 HSPC ROUTINE HOME CARE

## 2020-05-31 PROCEDURE — 0651 HSPC ROUTINE HOME CARE

## 2020-06-01 PROCEDURE — 0651 HSPC ROUTINE HOME CARE

## 2020-06-02 PROCEDURE — 0651 HSPC ROUTINE HOME CARE

## 2020-06-03 PROCEDURE — 0651 HSPC ROUTINE HOME CARE

## 2020-06-04 ENCOUNTER — HOME CARE VISIT (OUTPATIENT)
Dept: SCHEDULING | Facility: HOME HEALTH | Age: 80
End: 2020-06-04
Payer: MEDICARE

## 2020-06-04 VITALS
DIASTOLIC BLOOD PRESSURE: 60 MMHG | TEMPERATURE: 98.2 F | RESPIRATION RATE: 18 BRPM | HEART RATE: 84 BPM | SYSTOLIC BLOOD PRESSURE: 130 MMHG

## 2020-06-04 PROCEDURE — HOSPICE MEDICATION HC HH HOSPICE MEDICATION

## 2020-06-04 PROCEDURE — G0299 HHS/HOSPICE OF RN EA 15 MIN: HCPCS

## 2020-06-04 PROCEDURE — 0651 HSPC ROUTINE HOME CARE

## 2020-06-05 PROCEDURE — 0651 HSPC ROUTINE HOME CARE

## 2020-06-06 PROCEDURE — 0651 HSPC ROUTINE HOME CARE

## 2020-06-07 PROCEDURE — 0651 HSPC ROUTINE HOME CARE

## 2020-06-08 PROCEDURE — 0651 HSPC ROUTINE HOME CARE

## 2020-06-09 ENCOUNTER — HOME CARE VISIT (OUTPATIENT)
Dept: HOSPICE | Facility: HOSPICE | Age: 80
End: 2020-06-09
Payer: MEDICARE

## 2020-06-09 PROCEDURE — G0155 HHCP-SVS OF CSW,EA 15 MIN: HCPCS

## 2020-06-09 PROCEDURE — 0651 HSPC ROUTINE HOME CARE

## 2020-06-10 PROCEDURE — 0651 HSPC ROUTINE HOME CARE

## 2020-06-11 ENCOUNTER — HOME CARE VISIT (OUTPATIENT)
Dept: SCHEDULING | Facility: HOME HEALTH | Age: 80
End: 2020-06-11
Payer: MEDICARE

## 2020-06-11 VITALS
DIASTOLIC BLOOD PRESSURE: 70 MMHG | TEMPERATURE: 97.6 F | RESPIRATION RATE: 18 BRPM | SYSTOLIC BLOOD PRESSURE: 120 MMHG | HEART RATE: 84 BPM

## 2020-06-11 PROCEDURE — G0299 HHS/HOSPICE OF RN EA 15 MIN: HCPCS

## 2020-06-11 PROCEDURE — 0651 HSPC ROUTINE HOME CARE

## 2020-06-12 PROCEDURE — 0651 HSPC ROUTINE HOME CARE

## 2020-06-13 PROCEDURE — 0651 HSPC ROUTINE HOME CARE

## 2020-06-14 PROCEDURE — 0651 HSPC ROUTINE HOME CARE

## 2020-06-15 PROCEDURE — 0651 HSPC ROUTINE HOME CARE

## 2020-06-16 PROCEDURE — 0651 HSPC ROUTINE HOME CARE

## 2020-06-17 ENCOUNTER — HOME CARE VISIT (OUTPATIENT)
Dept: HOSPICE | Facility: HOSPICE | Age: 80
End: 2020-06-17
Payer: MEDICARE

## 2020-06-17 PROCEDURE — 0651 HSPC ROUTINE HOME CARE

## 2020-06-18 ENCOUNTER — FACE TO FACE ENCOUNTER (OUTPATIENT)
Dept: HOSPICE | Age: 80
End: 2020-06-18

## 2020-06-18 ENCOUNTER — HOME CARE VISIT (OUTPATIENT)
Dept: SCHEDULING | Facility: HOME HEALTH | Age: 80
End: 2020-06-18
Payer: MEDICARE

## 2020-06-18 VITALS
TEMPERATURE: 98.2 F | SYSTOLIC BLOOD PRESSURE: 120 MMHG | DIASTOLIC BLOOD PRESSURE: 70 MMHG | HEART RATE: 76 BPM | RESPIRATION RATE: 18 BRPM

## 2020-06-18 DIAGNOSIS — B96.5 BACTEREMIA DUE TO PSEUDOMONAS: ICD-10-CM

## 2020-06-18 DIAGNOSIS — R06.09 EXERTIONAL DYSPNEA: ICD-10-CM

## 2020-06-18 DIAGNOSIS — G61.81 CIDP (CHRONIC INFLAMMATORY DEMYELINATING POLYNEUROPATHY) (HCC): ICD-10-CM

## 2020-06-18 DIAGNOSIS — G47.33 OBSTRUCTIVE SLEEP APNEA: ICD-10-CM

## 2020-06-18 DIAGNOSIS — I82.403 DEEP VEIN THROMBOSIS (DVT) OF BOTH LOWER EXTREMITIES, UNSPECIFIED CHRONICITY, UNSPECIFIED VEIN (HCC): ICD-10-CM

## 2020-06-18 DIAGNOSIS — R26.89 LOSS OF BALANCE: ICD-10-CM

## 2020-06-18 DIAGNOSIS — M48.061 SPINAL STENOSIS OF LUMBAR REGION WITH RADICULOPATHY: ICD-10-CM

## 2020-06-18 DIAGNOSIS — I51.7 VENTRICULAR ENLARGEMENT, RIGHT: ICD-10-CM

## 2020-06-18 DIAGNOSIS — M54.16 SPINAL STENOSIS OF LUMBAR REGION WITH RADICULOPATHY: ICD-10-CM

## 2020-06-18 DIAGNOSIS — I10 HYPERTENSION, UNSPECIFIED TYPE: Primary | ICD-10-CM

## 2020-06-18 DIAGNOSIS — E66.01 CLASS 2 SEVERE OBESITY DUE TO EXCESS CALORIES WITH SERIOUS COMORBIDITY IN ADULT, UNSPECIFIED BMI (HCC): ICD-10-CM

## 2020-06-18 DIAGNOSIS — R60.0 LOWER EXTREMITY EDEMA: ICD-10-CM

## 2020-06-18 DIAGNOSIS — E11.8 CONTROLLED TYPE 2 DIABETES MELLITUS WITH COMPLICATION, WITHOUT LONG-TERM CURRENT USE OF INSULIN (HCC): ICD-10-CM

## 2020-06-18 DIAGNOSIS — R78.81 BACTEREMIA DUE TO PSEUDOMONAS: ICD-10-CM

## 2020-06-18 DIAGNOSIS — M80.08XD FRACTURE OF VERTEBRA DUE TO OSTEOPOROSIS WITH ROUTINE HEALING: ICD-10-CM

## 2020-06-18 DIAGNOSIS — Z51.5 HOSPICE CARE PATIENT: ICD-10-CM

## 2020-06-18 DIAGNOSIS — F39 MOOD DISORDER (HCC): ICD-10-CM

## 2020-06-18 DIAGNOSIS — Z86.718 HISTORY OF DEEP VENOUS THROMBOSIS (DVT) OF DISTAL VEIN OF LEFT LOWER EXTREMITY: ICD-10-CM

## 2020-06-18 DIAGNOSIS — S32.010A CLOSED COMPRESSION FRACTURE OF BODY OF L1 VERTEBRA (HCC): ICD-10-CM

## 2020-06-18 DIAGNOSIS — N20.1 URETERAL CALCULUS: ICD-10-CM

## 2020-06-18 DIAGNOSIS — Z79.01 ANTICOAGULATED ON COUMADIN: ICD-10-CM

## 2020-06-18 PROBLEM — E88.09 HYPOALBUMINEMIA DUE TO PROTEIN-CALORIE MALNUTRITION (HCC): Status: ACTIVE | Noted: 2020-06-18

## 2020-06-18 PROBLEM — E46 HYPOALBUMINEMIA DUE TO PROTEIN-CALORIE MALNUTRITION (HCC): Status: ACTIVE | Noted: 2020-06-18

## 2020-06-18 PROCEDURE — HOSPICE MEDICATION HC HH HOSPICE MEDICATION

## 2020-06-18 PROCEDURE — 0651 HSPC ROUTINE HOME CARE

## 2020-06-18 PROCEDURE — G0299 HHS/HOSPICE OF RN EA 15 MIN: HCPCS

## 2020-06-18 RX ORDER — FAMOTIDINE 20 MG/1
20 TABLET, FILM COATED ORAL AS NEEDED
COMMUNITY
End: 2020-09-28 | Stop reason: SDUPTHER

## 2020-06-18 NOTE — FACE TO FACE
Today I conducted a face-to-face interview with Rachid Coy via iPhone from my office facilitated by Mary Alberto RN case manager at the patient's home. This was done in the setting of COVID-19 epidemic in [place] at this time. Blood pressure was measured at 120/70 and heart rate at 82/min per nurse. Observations via telephone include complete resolution of ankle and pedal edema and persistent dyspnea with minimal exertion of conversation. He has fairly massive truncal obesity and significant calf muscle wasting. The patient is oriented to his immediate surroundings place and time, but has some difficulty with recollection of details. His affect is normal.  He greeted the news that our assessment of his prognosis symptoms improved significantly and an appropriately lighthearted fashion. He expressed some worry about resumption of care in office per Dr. Roz Gonzalez given present epidemic circumstances. Review of systems: Patient feels his discomfort from peripheral neuropathy is unchanged most days but it seemed to improve with a topical product provided for him by his podiatrist.  He has had no falls this month. His blood sugars have been running little higher than usual 160 to 200 mg per DL and he senses that his abdominal girth may have increased. Diarrhea is down to 2 bowel movements per day and he is remained incontinent. Pain in lumbar area that arises is controllable with APAP as needed. Dyspnea on exertion is stable. He is sleeping well with oxygen supplemented CPAP at night. He is wearing no oxygen during the day. He denies chest pain or pleuritic pain. Denies lower extremity muscular skeletal pain. Active problems: 1) respiratory failure from recurrent pneumonia, obesity and remote pulmonary embolism has improved to 2019 baseline. He is no longer resorting to benzonatate for cough suppression.   2.)  Depression anxiety mild early vascular dementia- affect improved over time with increased physical function and better bowel control. 3.)  Hypertensive cardiomyopathy-stable  4.)  Paroxysmal atrial tachycardia asymptomatic on present regimen of low-dose metoprolol 12.5 mg twice daily and moderate dose diltiazem 180 mg sustained-release daily  5.)  Progressive complex neuropathy CIDP, diabetic neuropathy, spinal stenosis and recent L1 compression fracture-functionally stable, use of opioid for pain control was very infrequent. 6.)  Renal lithiasis and recurrent UTI-quiescent on Flomax  7.)  Morbid obesity BMI greater than 40, addressing this simultaneously with poor blood glucose control on metformin will be left to the patient's primary physician as preventative care is not in the purview of hospice/palliative medicine.      Plan: Continue warfarin 5 mg p.o. daily and PT/INR every 2 weeks   and  to graduate patient from hospice over the next 4 to 6 weeks with transition to standard allopathic care Lauryn Tiwari MD)

## 2020-06-19 PROCEDURE — 0651 HSPC ROUTINE HOME CARE

## 2020-06-20 PROCEDURE — 0651 HSPC ROUTINE HOME CARE

## 2020-06-21 PROCEDURE — 0651 HSPC ROUTINE HOME CARE

## 2020-06-22 PROCEDURE — 0651 HSPC ROUTINE HOME CARE

## 2020-06-23 PROCEDURE — 0651 HSPC ROUTINE HOME CARE

## 2020-06-24 ENCOUNTER — HOME CARE VISIT (OUTPATIENT)
Dept: SCHEDULING | Facility: HOME HEALTH | Age: 80
End: 2020-06-24
Payer: MEDICARE

## 2020-06-24 PROCEDURE — 0651 HSPC ROUTINE HOME CARE

## 2020-06-25 ENCOUNTER — HOME CARE VISIT (OUTPATIENT)
Dept: SCHEDULING | Facility: HOME HEALTH | Age: 80
End: 2020-06-25
Payer: MEDICARE

## 2020-06-25 ENCOUNTER — HOME CARE VISIT (OUTPATIENT)
Dept: HOSPICE | Facility: HOSPICE | Age: 80
End: 2020-06-25
Payer: MEDICARE

## 2020-06-25 VITALS
HEART RATE: 72 BPM | SYSTOLIC BLOOD PRESSURE: 110 MMHG | DIASTOLIC BLOOD PRESSURE: 70 MMHG | RESPIRATION RATE: 16 BRPM | TEMPERATURE: 97.6 F

## 2020-06-25 PROCEDURE — G0155 HHCP-SVS OF CSW,EA 15 MIN: HCPCS

## 2020-06-25 PROCEDURE — G0299 HHS/HOSPICE OF RN EA 15 MIN: HCPCS

## 2020-06-25 PROCEDURE — HOSPICE MEDICATION HC HH HOSPICE MEDICATION

## 2020-06-25 PROCEDURE — 0651 HSPC ROUTINE HOME CARE

## 2020-06-26 PROCEDURE — 0651 HSPC ROUTINE HOME CARE

## 2020-06-27 PROCEDURE — 0651 HSPC ROUTINE HOME CARE

## 2020-06-28 PROCEDURE — 0651 HSPC ROUTINE HOME CARE

## 2020-06-29 PROCEDURE — 0651 HSPC ROUTINE HOME CARE

## 2020-06-30 PROCEDURE — 0651 HSPC ROUTINE HOME CARE

## 2020-07-01 PROCEDURE — 0651 HSPC ROUTINE HOME CARE

## 2020-07-02 ENCOUNTER — HOME CARE VISIT (OUTPATIENT)
Dept: SCHEDULING | Facility: HOME HEALTH | Age: 80
End: 2020-07-02
Payer: MEDICARE

## 2020-07-02 VITALS
DIASTOLIC BLOOD PRESSURE: 60 MMHG | SYSTOLIC BLOOD PRESSURE: 120 MMHG | HEART RATE: 76 BPM | RESPIRATION RATE: 16 BRPM | TEMPERATURE: 98 F

## 2020-07-02 PROCEDURE — G0299 HHS/HOSPICE OF RN EA 15 MIN: HCPCS

## 2020-07-02 PROCEDURE — HOSPICE MEDICATION HC HH HOSPICE MEDICATION

## 2020-07-02 PROCEDURE — 0651 HSPC ROUTINE HOME CARE

## 2020-07-03 PROCEDURE — 0651 HSPC ROUTINE HOME CARE

## 2020-07-04 PROCEDURE — 0651 HSPC ROUTINE HOME CARE

## 2020-07-05 PROCEDURE — 0651 HSPC ROUTINE HOME CARE

## 2020-07-06 PROCEDURE — 0651 HSPC ROUTINE HOME CARE

## 2020-07-07 PROCEDURE — 0651 HSPC ROUTINE HOME CARE

## 2020-07-08 PROCEDURE — 0651 HSPC ROUTINE HOME CARE

## 2020-07-09 PROCEDURE — 0651 HSPC ROUTINE HOME CARE

## 2020-07-10 ENCOUNTER — HOME CARE VISIT (OUTPATIENT)
Dept: SCHEDULING | Facility: HOME HEALTH | Age: 80
End: 2020-07-10
Payer: MEDICARE

## 2020-07-10 VITALS
DIASTOLIC BLOOD PRESSURE: 62 MMHG | RESPIRATION RATE: 16 BRPM | SYSTOLIC BLOOD PRESSURE: 120 MMHG | HEART RATE: 80 BPM | TEMPERATURE: 98.3 F

## 2020-07-10 PROCEDURE — G0299 HHS/HOSPICE OF RN EA 15 MIN: HCPCS

## 2020-07-10 PROCEDURE — 0651 HSPC ROUTINE HOME CARE

## 2020-07-11 PROCEDURE — 0651 HSPC ROUTINE HOME CARE

## 2020-07-12 PROCEDURE — 0651 HSPC ROUTINE HOME CARE

## 2020-07-13 PROCEDURE — 0651 HSPC ROUTINE HOME CARE

## 2020-07-14 PROCEDURE — 0651 HSPC ROUTINE HOME CARE

## 2020-07-15 PROCEDURE — 0651 HSPC ROUTINE HOME CARE

## 2020-07-16 ENCOUNTER — HOME CARE VISIT (OUTPATIENT)
Dept: HOSPICE | Facility: HOSPICE | Age: 80
End: 2020-07-16
Payer: MEDICARE

## 2020-07-16 ENCOUNTER — HOME CARE VISIT (OUTPATIENT)
Dept: SCHEDULING | Facility: HOME HEALTH | Age: 80
End: 2020-07-16
Payer: MEDICARE

## 2020-07-16 VITALS
HEART RATE: 88 BPM | SYSTOLIC BLOOD PRESSURE: 120 MMHG | DIASTOLIC BLOOD PRESSURE: 60 MMHG | RESPIRATION RATE: 16 BRPM | TEMPERATURE: 97.8 F

## 2020-07-16 PROCEDURE — 0651 HSPC ROUTINE HOME CARE

## 2020-07-16 PROCEDURE — G0155 HHCP-SVS OF CSW,EA 15 MIN: HCPCS

## 2020-07-16 PROCEDURE — HOSPICE MEDICATION HC HH HOSPICE MEDICATION

## 2020-07-16 PROCEDURE — G0299 HHS/HOSPICE OF RN EA 15 MIN: HCPCS

## 2020-07-17 PROCEDURE — 0651 HSPC ROUTINE HOME CARE

## 2020-07-18 PROCEDURE — 0651 HSPC ROUTINE HOME CARE

## 2020-07-19 PROCEDURE — 0651 HSPC ROUTINE HOME CARE

## 2020-07-20 PROCEDURE — 0651 HSPC ROUTINE HOME CARE

## 2020-07-21 PROCEDURE — 0651 HSPC ROUTINE HOME CARE

## 2020-07-22 PROCEDURE — 0651 HSPC ROUTINE HOME CARE

## 2020-07-23 ENCOUNTER — HOME CARE VISIT (OUTPATIENT)
Dept: SCHEDULING | Facility: HOME HEALTH | Age: 80
End: 2020-07-23
Payer: MEDICARE

## 2020-07-23 VITALS
HEART RATE: 80 BPM | DIASTOLIC BLOOD PRESSURE: 60 MMHG | RESPIRATION RATE: 16 BRPM | TEMPERATURE: 97.2 F | SYSTOLIC BLOOD PRESSURE: 110 MMHG

## 2020-07-23 PROCEDURE — 0651 HSPC ROUTINE HOME CARE

## 2020-07-23 PROCEDURE — G0299 HHS/HOSPICE OF RN EA 15 MIN: HCPCS

## 2020-07-24 PROCEDURE — 0651 HSPC ROUTINE HOME CARE

## 2020-07-25 PROCEDURE — 0651 HSPC ROUTINE HOME CARE

## 2020-07-26 PROCEDURE — 0651 HSPC ROUTINE HOME CARE

## 2020-07-27 PROCEDURE — 0651 HSPC ROUTINE HOME CARE

## 2020-07-28 PROCEDURE — 0651 HSPC ROUTINE HOME CARE

## 2020-07-29 PROCEDURE — 0651 HSPC ROUTINE HOME CARE

## 2020-07-30 ENCOUNTER — HOME CARE VISIT (OUTPATIENT)
Dept: SCHEDULING | Facility: HOME HEALTH | Age: 80
End: 2020-07-30
Payer: MEDICARE

## 2020-07-30 PROCEDURE — 0651 HSPC ROUTINE HOME CARE

## 2020-07-31 ENCOUNTER — HOME CARE VISIT (OUTPATIENT)
Dept: SCHEDULING | Facility: HOME HEALTH | Age: 80
End: 2020-07-31
Payer: MEDICARE

## 2020-07-31 VITALS
DIASTOLIC BLOOD PRESSURE: 80 MMHG | SYSTOLIC BLOOD PRESSURE: 126 MMHG | WEIGHT: 256.2 LBS | RESPIRATION RATE: 16 BRPM | TEMPERATURE: 98 F | BODY MASS INDEX: 36.76 KG/M2 | HEART RATE: 80 BPM

## 2020-07-31 PROCEDURE — 0651 HSPC ROUTINE HOME CARE

## 2020-07-31 PROCEDURE — G0299 HHS/HOSPICE OF RN EA 15 MIN: HCPCS

## 2020-08-01 PROCEDURE — 0651 HSPC ROUTINE HOME CARE

## 2020-08-02 PROCEDURE — 0651 HSPC ROUTINE HOME CARE

## 2020-08-03 PROCEDURE — 0651 HSPC ROUTINE HOME CARE

## 2020-08-04 PROCEDURE — 0651 HSPC ROUTINE HOME CARE

## 2020-08-05 ENCOUNTER — HOME CARE VISIT (OUTPATIENT)
Dept: HOSPICE | Facility: HOSPICE | Age: 80
End: 2020-08-05
Payer: MEDICARE

## 2020-08-05 PROCEDURE — 0651 HSPC ROUTINE HOME CARE

## 2020-08-06 ENCOUNTER — HOME CARE VISIT (OUTPATIENT)
Dept: SCHEDULING | Facility: HOME HEALTH | Age: 80
End: 2020-08-06
Payer: MEDICARE

## 2020-08-06 VITALS
RESPIRATION RATE: 16 BRPM | HEART RATE: 84 BPM | SYSTOLIC BLOOD PRESSURE: 120 MMHG | TEMPERATURE: 98.5 F | DIASTOLIC BLOOD PRESSURE: 70 MMHG

## 2020-08-06 PROCEDURE — 0651 HSPC ROUTINE HOME CARE

## 2020-08-06 PROCEDURE — G0299 HHS/HOSPICE OF RN EA 15 MIN: HCPCS

## 2020-08-07 PROCEDURE — 0651 HSPC ROUTINE HOME CARE

## 2020-08-08 PROCEDURE — 0651 HSPC ROUTINE HOME CARE

## 2020-08-09 PROCEDURE — 0651 HSPC ROUTINE HOME CARE

## 2020-08-10 PROCEDURE — 0651 HSPC ROUTINE HOME CARE

## 2020-08-11 PROCEDURE — 0651 HSPC ROUTINE HOME CARE

## 2020-08-12 PROCEDURE — 0651 HSPC ROUTINE HOME CARE

## 2020-08-13 ENCOUNTER — HOME CARE VISIT (OUTPATIENT)
Dept: SCHEDULING | Facility: HOME HEALTH | Age: 80
End: 2020-08-13
Payer: MEDICARE

## 2020-08-13 VITALS
RESPIRATION RATE: 16 BRPM | DIASTOLIC BLOOD PRESSURE: 70 MMHG | TEMPERATURE: 98.2 F | HEART RATE: 76 BPM | SYSTOLIC BLOOD PRESSURE: 112 MMHG

## 2020-08-13 PROCEDURE — 0651 HSPC ROUTINE HOME CARE

## 2020-08-13 PROCEDURE — G0299 HHS/HOSPICE OF RN EA 15 MIN: HCPCS

## 2020-08-14 PROCEDURE — HOSPICE MEDICATION HC HH HOSPICE MEDICATION

## 2020-08-14 PROCEDURE — 0651 HSPC ROUTINE HOME CARE

## 2020-08-15 PROCEDURE — 0651 HSPC ROUTINE HOME CARE

## 2020-08-16 PROCEDURE — 0651 HSPC ROUTINE HOME CARE

## 2020-08-17 PROCEDURE — 0651 HSPC ROUTINE HOME CARE

## 2020-08-18 PROCEDURE — 0651 HSPC ROUTINE HOME CARE

## 2020-08-19 PROCEDURE — 0651 HSPC ROUTINE HOME CARE

## 2020-08-20 PROCEDURE — 0651 HSPC ROUTINE HOME CARE

## 2020-08-21 ENCOUNTER — HOME CARE VISIT (OUTPATIENT)
Dept: SCHEDULING | Facility: HOME HEALTH | Age: 80
End: 2020-08-21
Payer: MEDICARE

## 2020-08-21 PROCEDURE — G0299 HHS/HOSPICE OF RN EA 15 MIN: HCPCS

## 2020-08-21 PROCEDURE — 0651 HSPC ROUTINE HOME CARE

## 2020-08-22 PROCEDURE — 0651 HSPC ROUTINE HOME CARE

## 2020-08-23 PROCEDURE — 0651 HSPC ROUTINE HOME CARE

## 2020-08-24 ENCOUNTER — HOME CARE VISIT (OUTPATIENT)
Dept: HOSPICE | Facility: HOSPICE | Age: 80
End: 2020-08-24
Payer: MEDICARE

## 2020-08-24 VITALS — RESPIRATION RATE: 18 BRPM | DIASTOLIC BLOOD PRESSURE: 71 MMHG | SYSTOLIC BLOOD PRESSURE: 126 MMHG | HEART RATE: 81 BPM

## 2020-08-24 PROCEDURE — 0651 HSPC ROUTINE HOME CARE

## 2020-08-25 PROCEDURE — 0651 HSPC ROUTINE HOME CARE

## 2020-08-26 ENCOUNTER — HOME CARE VISIT (OUTPATIENT)
Dept: SCHEDULING | Facility: HOME HEALTH | Age: 80
End: 2020-08-26
Payer: MEDICARE

## 2020-08-26 PROCEDURE — 0651 HSPC ROUTINE HOME CARE

## 2020-08-27 ENCOUNTER — HOME CARE VISIT (OUTPATIENT)
Dept: SCHEDULING | Facility: HOME HEALTH | Age: 80
End: 2020-08-27
Payer: MEDICARE

## 2020-08-27 PROCEDURE — 0651 HSPC ROUTINE HOME CARE

## 2020-08-27 PROCEDURE — G0299 HHS/HOSPICE OF RN EA 15 MIN: HCPCS

## 2020-08-28 ENCOUNTER — HOME CARE VISIT (OUTPATIENT)
Dept: HOSPICE | Facility: HOSPICE | Age: 80
End: 2020-08-28
Payer: MEDICARE

## 2020-08-28 VITALS
HEART RATE: 84 BPM | RESPIRATION RATE: 16 BRPM | TEMPERATURE: 98.4 F | DIASTOLIC BLOOD PRESSURE: 70 MMHG | SYSTOLIC BLOOD PRESSURE: 124 MMHG

## 2020-08-28 PROCEDURE — G0299 HHS/HOSPICE OF RN EA 15 MIN: HCPCS

## 2020-08-28 PROCEDURE — T4524 ADULT SIZE BRIEF/DIAPER XL: HCPCS

## 2020-08-28 PROCEDURE — 0651 HSPC ROUTINE HOME CARE

## 2020-08-28 PROCEDURE — A4349 DISPOSABLE MALE EXTERNAL CAT: HCPCS

## 2020-08-28 PROCEDURE — G0155 HHCP-SVS OF CSW,EA 15 MIN: HCPCS

## 2020-08-28 PROCEDURE — T4541 LARGE DISPOSABLE UNDERPAD: HCPCS

## 2020-08-29 PROCEDURE — 0651 HSPC ROUTINE HOME CARE

## 2020-08-30 VITALS — HEART RATE: 81 BPM | DIASTOLIC BLOOD PRESSURE: 72 MMHG | SYSTOLIC BLOOD PRESSURE: 120 MMHG | RESPIRATION RATE: 18 BRPM

## 2020-08-30 PROCEDURE — 0651 HSPC ROUTINE HOME CARE

## 2020-08-31 PROCEDURE — 0651 HSPC ROUTINE HOME CARE

## 2020-09-01 PROCEDURE — 0651 HSPC ROUTINE HOME CARE

## 2020-09-02 PROCEDURE — 0651 HSPC ROUTINE HOME CARE

## 2020-09-03 ENCOUNTER — HOME CARE VISIT (OUTPATIENT)
Dept: SCHEDULING | Facility: HOME HEALTH | Age: 80
End: 2020-09-03
Payer: MEDICARE

## 2020-09-03 VITALS
HEART RATE: 82 BPM | RESPIRATION RATE: 16 BRPM | SYSTOLIC BLOOD PRESSURE: 120 MMHG | DIASTOLIC BLOOD PRESSURE: 70 MMHG | TEMPERATURE: 98 F

## 2020-09-03 PROCEDURE — G0299 HHS/HOSPICE OF RN EA 15 MIN: HCPCS

## 2020-09-03 PROCEDURE — HOSPICE MEDICATION HC HH HOSPICE MEDICATION

## 2020-09-03 PROCEDURE — 0651 HSPC ROUTINE HOME CARE

## 2020-09-04 PROCEDURE — 0651 HSPC ROUTINE HOME CARE

## 2020-09-05 PROCEDURE — 0651 HSPC ROUTINE HOME CARE

## 2020-09-06 PROCEDURE — 0651 HSPC ROUTINE HOME CARE

## 2020-09-07 PROCEDURE — 0651 HSPC ROUTINE HOME CARE

## 2020-09-08 ENCOUNTER — HOME CARE VISIT (OUTPATIENT)
Dept: SCHEDULING | Facility: HOME HEALTH | Age: 80
End: 2020-09-08
Payer: MEDICARE

## 2020-09-08 ENCOUNTER — HOSPITAL ENCOUNTER (OUTPATIENT)
Dept: LAB | Age: 80
Discharge: HOME | End: 2020-09-08
Payer: OTHER MISCELLANEOUS

## 2020-09-08 VITALS
TEMPERATURE: 98.2 F | SYSTOLIC BLOOD PRESSURE: 120 MMHG | RESPIRATION RATE: 16 BRPM | HEART RATE: 84 BPM | DIASTOLIC BLOOD PRESSURE: 60 MMHG

## 2020-09-08 LAB
ANION GAP SERPL CALC-SCNC: 9 MMOL/L (ref 7–16)
BUN SERPL-MCNC: 18 MG/DL (ref 8–23)
CALCIUM SERPL-MCNC: 8.8 MG/DL (ref 8.3–10.4)
CHLORIDE SERPL-SCNC: 103 MMOL/L (ref 98–107)
CO2 SERPL-SCNC: 27 MMOL/L (ref 21–32)
CREAT SERPL-MCNC: 1.09 MG/DL (ref 0.8–1.5)
GLUCOSE SERPL-MCNC: 197 MG/DL (ref 65–100)
INR PPP: 1.7
POTASSIUM SERPL-SCNC: 4.2 MMOL/L (ref 3.5–5.1)
PROTHROMBIN TIME: 20.4 SEC (ref 12–14.7)
SODIUM SERPL-SCNC: 139 MMOL/L (ref 136–145)

## 2020-09-08 PROCEDURE — A4357 BEDSIDE DRAINAGE BAG: HCPCS

## 2020-09-08 PROCEDURE — G0299 HHS/HOSPICE OF RN EA 15 MIN: HCPCS

## 2020-09-08 PROCEDURE — 85610 PROTHROMBIN TIME: CPT

## 2020-09-08 PROCEDURE — 0651 HSPC ROUTINE HOME CARE

## 2020-09-08 PROCEDURE — 80048 BASIC METABOLIC PNL TOTAL CA: CPT

## 2020-09-09 PROCEDURE — 0651 HSPC ROUTINE HOME CARE

## 2020-09-10 ENCOUNTER — HOME CARE VISIT (OUTPATIENT)
Dept: HOSPICE | Facility: HOSPICE | Age: 80
End: 2020-09-10
Payer: MEDICARE

## 2020-09-10 PROCEDURE — G0155 HHCP-SVS OF CSW,EA 15 MIN: HCPCS

## 2020-09-10 PROCEDURE — 0651 HSPC ROUTINE HOME CARE

## 2020-09-11 PROCEDURE — 0651 HSPC ROUTINE HOME CARE

## 2020-09-12 PROCEDURE — 0651 HSPC ROUTINE HOME CARE

## 2020-09-13 PROCEDURE — 0651 HSPC ROUTINE HOME CARE

## 2020-09-14 ENCOUNTER — DOCUMENTATION ONLY (OUTPATIENT)
Dept: HOSPICE | Age: 80
End: 2020-09-14

## 2020-09-14 PROCEDURE — 0651 HSPC ROUTINE HOME CARE

## 2020-09-14 NOTE — PROGRESS NOTES
Face to Face    Patient: Umu Fontanez MRN: [id number]  SSN: [id number]    YOB: 1940  Age: 68 y.o. Sex: male      Subjective:      Umu Fontanez is a 68 y.o. male who was admitted to our hospice services after a diagnosis with pneumonia during [place]. Miraculously he responded well to the antibiotics and rebounded well. He has required no further symptom management. Past Medical History:   Diagnosis Date    Arrhythmia     Arthritis 6/23/2015    BDR (background diabetic retinopathy) ([facility] 6/23/2015    Depression 6/23/2015    Diabetes mellitus type 2, controlled ([facility]     Diabetic retinopathy ([facility] [facility] 6/23/2015    Heartburn 6/23/2015    Hyperlipidemia 6/23/2015    Right-sided heart failure (HCC)     Sleep apnea     Spinal stenosis     Spinal stenosis of lumbar region with radiculopathy 10/17/2017    Status post laminectomy 10/17/2017    status post L3-4, L4-5 decompressive laminectomy with excision of disc on 04/06/2016       Type II or unspecified type diabetes mellitus without mention of complication, not stated as uncontrolled      Past Surgical History:   Procedure Laterality Date    HX CATARACT REMOVAL Left     HX COLONOSCOPY  10/10/2017    Moderate diverticulosis other side normal fiding.  no more rpt    HX CYST REMOVAL      HX CYST REMOVAL      HX HEENT Right 2014    Cell removal; cheek    HX ORTHOPAEDIC  04/05/2016    back surgery Decompressive Laminectomy Lumbar      Family History   Problem Relation Age of Onset    Heart Disease Mother     Thyroid Disease Mother     COPD Mother     Arthritis-osteo Mother     Breast Cancer Mother     Cancer Mother         Prostate    Hypertension Father     Diabetes Father     Cancer Father         Prostate    Alzheimer Maternal Grandmother     Dementia Maternal Grandmother     Heart Disease Maternal Grandfather     Heart Disease Paternal Grandmother     Stroke Paternal Grandmother     Stroke Paternal Grandfather      Social History     Tobacco Use    Smoking status: Never Smoker    Smokeless tobacco: Never Used   Substance Use Topics    Alcohol use: No      Prior to Admission medications    Medication Sig Start Date End Date Taking? Authorizing Provider   meclizine (ANTIVERT) 25 mg tablet Take 25 mg by mouth every six (6) hours as needed for Dizziness (vertigo). Pt to try med for next 2-3 days to see if helps with vertigo. 8/28/20   Provider, Historical   famotidine (PEPCID) 20 mg tablet Take 20 mg by mouth as needed. Provider, Historical   warfarin (COUMADIN) 5 mg tablet Take 5 mg by mouth daily. Indications: pulmonary embolism 4/16/20   Provider, Historical   cpap machine kit 1 Each by Does Not Apply route nightly as needed for Other. Uses oxygen with CPAP. 4/9/20   Provider, Historical   metoprolol tartrate (LOPRESSOR) 25 mg tablet Take 25 mg by mouth two (2) times a day. Take 1/2 tablet 2 times daily. 4/2/20   Provider, Historical   dilTIAZem CD (CARDIZEM CD) 180 mg ER capsule Take 180 mg by mouth daily. 3/31/20   Provider, Historical   potassium chloride (KLOR-CON) 10 mEq tablet Take 10 mEq by mouth two (2) times a day. 3/31/20   Provider, Historical   furosemide (LASIX) 20 mg tablet Take 20 mg by mouth daily. 3/31/20   Provider, Historical   albuterol (PROVENTIL VENTOLIN) 2.5 mg /3 mL (0.083 %) nebu 1.25 mg by Nebulization route every six (6) hours as needed for Other (sob, wheezing, congestion). 3/25/20   Provider, Historical   benzonatate (TESSALON) 200 mg capsule Take 200 mg by mouth three (3) times daily as needed for Cough. 3/25/20   Provider, Historical   loperamide (IMODIUM) 2 mg capsule Take 2 mg by mouth four (4) times daily as needed for Diarrhea. 2 tablets after each loose stool. not to exceed 8 tabs daily 3/25/20   Provider, Historical   acetaminophen (TYLENOL) 500 mg tablet Take 500 mg by mouth every six (6) hours as needed for Fever (headache, mild pain).  3/25/20   Provider, Historical   HYDROcodone-acetaminophen (NORCO) 7.5-325 mg per tablet Take 1 Tab by mouth every four (4) hours as needed (moderate pains). 3/25/20   Provider, Historical   tamsulosin (FLOMAX) 0.4 mg capsule Take 0.4 mg by mouth daily. 3/25/20   Provider, Historical   Omeprazole delayed release (PRILOSEC D/R) 20 mg tablet Take 20 mg by mouth daily. 3/25/20   Provider, Historical   OXYGEN-AIR DELIVERY SYSTEMS Take 2 L/min by inhalation as needed for Other (sob). 3/25/20   Provider, Historical   metFORMIN (GLUCOPHAGE) 500 mg tablet Take 500 mg by mouth two (2) times daily (with meals). 4/16/20   Provider, Historical        No Known Allergies    Review of Systems:  A comprehensive review of systems was negative except for that written in the History of Present Illness. With the exception of vertigo this past week. occurs when turning in bed or turning head fast    Objective: There were no vitals filed for this visit. Physical Exam:  GENERAL: alert, cooperative, no distress, appears stated age, morbidly obese  LUNG: clear to auscultation bilaterally, sats were 93 percent after walking 20 feet and returning on room air. HEART: normal apical impulse, Standing blood pressures were stable  ABDOMEN: soft, non-tender. Bowel sounds normal. No masses,  no organomegaly, round,   MSK: uses rollator for ambulation with good balance and navigation    Assessment:     Hospital Problems  Date Reviewed: 6/18/2020    None          Plan:     Current Outpatient Medications   Medication Sig Dispense    meclizine (ANTIVERT) 25 mg tablet Take 25 mg by mouth every six (6) hours as needed for Dizziness (vertigo). Pt to try med for next 2-3 days to see if helps with vertigo.  famotidine (PEPCID) 20 mg tablet Take 20 mg by mouth as needed.  warfarin (COUMADIN) 5 mg tablet Take 5 mg by mouth daily. Indications: pulmonary embolism     cpap machine kit 1 Each by Does Not Apply route nightly as needed for Other.  Uses oxygen with CPAP.     metoprolol tartrate (LOPRESSOR) 25 mg tablet Take 25 mg by mouth two (2) times a day. Take 1/2 tablet 2 times daily.  dilTIAZem CD (CARDIZEM CD) 180 mg ER capsule Take 180 mg by mouth daily.  potassium chloride (KLOR-CON) 10 mEq tablet Take 10 mEq by mouth two (2) times a day.  furosemide (LASIX) 20 mg tablet Take 20 mg by mouth daily.  albuterol (PROVENTIL VENTOLIN) 2.5 mg /3 mL (0.083 %) nebu 1.25 mg by Nebulization route every six (6) hours as needed for Other (sob, wheezing, congestion).  benzonatate (TESSALON) 200 mg capsule Take 200 mg by mouth three (3) times daily as needed for Cough.  loperamide (IMODIUM) 2 mg capsule Take 2 mg by mouth four (4) times daily as needed for Diarrhea. 2 tablets after each loose stool. not to exceed 8 tabs daily     acetaminophen (TYLENOL) 500 mg tablet Take 500 mg by mouth every six (6) hours as needed for Fever (headache, mild pain).  HYDROcodone-acetaminophen (NORCO) 7.5-325 mg per tablet Take 1 Tab by mouth every four (4) hours as needed (moderate pains).  tamsulosin (FLOMAX) 0.4 mg capsule Take 0.4 mg by mouth daily.  Omeprazole delayed release (PRILOSEC D/R) 20 mg tablet Take 20 mg by mouth daily.  OXYGEN-AIR DELIVERY SYSTEMS Take 2 L/min by inhalation as needed for Other (sob).  metFORMIN (GLUCOPHAGE) 500 mg tablet Take 500 mg by mouth two (2) times daily (with meals). No current facility-administered medications for this visit. We will order overnight sleep study and refer him to Palliative vs. Physician House call service. He no longer meets criteria for hospice but his Chronic disease and his ambulation issues put him at risk for infection in an office setting especially in light of COVID 19. We will prescribe a home monitoring test for coumadin levels. Daughter is aware of his eligibility. We will do our best to assist with a smooth transition.  She is aware that we are happy to reevaluate should his condition worsen. Case discussed with Care team and Dr. Eric Delacruz is in aggreement with plan of discharge.     Signed By: Honey Jackson NP     September 14, 2020

## 2020-09-15 PROCEDURE — 0651 HSPC ROUTINE HOME CARE

## 2020-09-16 PROCEDURE — 0651 HSPC ROUTINE HOME CARE

## 2020-09-17 ENCOUNTER — HOME CARE VISIT (OUTPATIENT)
Dept: SCHEDULING | Facility: HOME HEALTH | Age: 80
End: 2020-09-17
Payer: MEDICARE

## 2020-09-17 PROCEDURE — G0299 HHS/HOSPICE OF RN EA 15 MIN: HCPCS

## 2020-09-17 PROCEDURE — HOSPICE MEDICATION HC HH HOSPICE MEDICATION

## 2020-09-17 PROCEDURE — 0651 HSPC ROUTINE HOME CARE

## 2020-09-28 PROBLEM — I26.99 OTHER PULMONARY EMBOLISM WITHOUT ACUTE COR PULMONALE (HCC): Status: RESOLVED | Noted: 2018-11-13 | Resolved: 2020-09-28

## 2020-11-23 PROBLEM — E88.09 HYPOALBUMINEMIA DUE TO PROTEIN-CALORIE MALNUTRITION (HCC): Status: RESOLVED | Noted: 2020-06-18 | Resolved: 2020-11-23

## 2020-11-23 PROBLEM — E46 HYPOALBUMINEMIA DUE TO PROTEIN-CALORIE MALNUTRITION (HCC): Status: RESOLVED | Noted: 2020-06-18 | Resolved: 2020-11-23

## 2021-01-20 PROBLEM — R15.9 INCONTINENCE OF FECES: Status: ACTIVE | Noted: 2021-01-20

## 2021-01-20 PROBLEM — R32 URINARY INCONTINENCE: Status: ACTIVE | Noted: 2021-01-20

## 2021-01-20 PROBLEM — R19.7 INTERMITTENT DIARRHEA: Status: ACTIVE | Noted: 2021-01-20

## 2021-05-18 PROBLEM — I34.0 NONRHEUMATIC MITRAL VALVE REGURGITATION: Status: ACTIVE | Noted: 2021-05-18

## 2021-05-18 PROBLEM — G89.11 ACUTE LOW BACK PAIN DUE TO TRAUMA: Status: RESOLVED | Noted: 2020-01-27 | Resolved: 2021-05-18

## 2021-05-18 PROBLEM — I82.409 DVT (DEEP VENOUS THROMBOSIS) (HCC): Status: RESOLVED | Noted: 2018-06-12 | Resolved: 2021-05-18

## 2021-05-18 PROBLEM — M54.50 ACUTE LOW BACK PAIN DUE TO TRAUMA: Status: RESOLVED | Noted: 2020-01-27 | Resolved: 2021-05-18

## 2021-06-11 PROBLEM — J98.4 RESTRICTIVE LUNG DISEASE: Status: ACTIVE | Noted: 2021-06-11

## 2021-06-11 PROBLEM — R00.1 BRADYCARDIA: Status: ACTIVE | Noted: 2021-06-11

## 2021-10-06 ENCOUNTER — HOSPITAL ENCOUNTER (OUTPATIENT)
Dept: LAB | Age: 81
Discharge: HOME | End: 2021-10-06
Payer: MEDICARE

## 2021-10-06 DIAGNOSIS — D69.6 THROMBOCYTOPENIA (HCC): ICD-10-CM

## 2021-10-06 LAB
ALBUMIN SERPL-MCNC: 3.4 G/DL (ref 3.2–4.6)
ALBUMIN/GLOB SERPL: 1 {RATIO} (ref 1.2–3.5)
ALP SERPL-CCNC: 54 U/L (ref 50–136)
ALT SERPL-CCNC: 46 U/L (ref 12–65)
ANION GAP SERPL CALC-SCNC: 8 MMOL/L (ref 7–16)
AST SERPL-CCNC: 24 U/L (ref 15–37)
BASOPHILS # BLD: 0 K/UL (ref 0–0.2)
BASOPHILS NFR BLD: 0 % (ref 0–2)
BILIRUB SERPL-MCNC: 0.8 MG/DL (ref 0.2–1.1)
BUN SERPL-MCNC: 20 MG/DL (ref 8–23)
CALCIUM SERPL-MCNC: 9 MG/DL (ref 8.3–10.4)
CHLORIDE SERPL-SCNC: 108 MMOL/L (ref 98–107)
CO2 SERPL-SCNC: 26 MMOL/L (ref 21–32)
CREAT SERPL-MCNC: 1.5 MG/DL (ref 0.8–1.5)
DIFFERENTIAL METHOD BLD: ABNORMAL
EOSINOPHIL # BLD: 0.3 K/UL (ref 0–0.8)
EOSINOPHIL NFR BLD: 4 % (ref 0.5–7.8)
ERYTHROCYTE [DISTWIDTH] IN BLOOD BY AUTOMATED COUNT: 14 % (ref 11.9–14.6)
GLOBULIN SER CALC-MCNC: 3.3 G/DL (ref 2.3–3.5)
GLUCOSE SERPL-MCNC: 174 MG/DL (ref 65–100)
HCT VFR BLD AUTO: 46.4 %
HGB BLD-MCNC: 15.2 G/DL (ref 13.6–17.2)
IMM GRANULOCYTES # BLD AUTO: 0 K/UL (ref 0–0.5)
IMM GRANULOCYTES NFR BLD AUTO: 0 % (ref 0–5)
LYMPHOCYTES # BLD: 1.6 K/UL (ref 0.5–4.6)
LYMPHOCYTES NFR BLD: 22 % (ref 13–44)
MCH RBC QN AUTO: 28.6 PG (ref 26.1–32.9)
MCHC RBC AUTO-ENTMCNC: 32.8 G/DL (ref 31.4–35)
MCV RBC AUTO: 87.4 FL (ref 79.6–97.8)
MONOCYTES # BLD: 0.5 K/UL (ref 0.1–1.3)
MONOCYTES NFR BLD: 7 % (ref 4–12)
NEUTS SEG # BLD: 4.7 K/UL (ref 1.7–8.2)
NEUTS SEG NFR BLD: 66 % (ref 43–78)
NRBC # BLD: 0 K/UL (ref 0–0.2)
PLATELET # BLD AUTO: 100 K/UL (ref 150–450)
PLATELET # BLD AUTO: 130 K/UL (ref 150–450)
PMV BLD AUTO: 9.5 FL (ref 9.4–12.3)
POTASSIUM SERPL-SCNC: 4.4 MMOL/L (ref 3.5–5.1)
PROT SERPL-MCNC: 6.7 G/DL (ref 6.3–8.2)
RBC # BLD AUTO: 5.31 M/UL (ref 4.23–5.6)
SODIUM SERPL-SCNC: 142 MMOL/L (ref 136–145)
WBC # BLD AUTO: 7 K/UL (ref 4.3–11.1)

## 2021-10-06 PROCEDURE — 80053 COMPREHEN METABOLIC PANEL: CPT

## 2021-10-06 PROCEDURE — 36415 COLL VENOUS BLD VENIPUNCTURE: CPT

## 2021-10-06 PROCEDURE — 85025 COMPLETE CBC W/AUTO DIFF WBC: CPT

## 2021-10-06 PROCEDURE — 85049 AUTOMATED PLATELET COUNT: CPT

## 2022-01-18 PROBLEM — I49.3 PVC (PREMATURE VENTRICULAR CONTRACTION): Status: ACTIVE | Noted: 2021-08-23

## 2022-03-18 PROBLEM — I49.3 PVC (PREMATURE VENTRICULAR CONTRACTION): Status: ACTIVE | Noted: 2021-08-23

## 2022-03-18 PROBLEM — G89.29 CHRONIC MIDLINE LOW BACK PAIN WITHOUT SCIATICA: Status: ACTIVE | Noted: 2017-10-17

## 2022-03-18 PROBLEM — R78.81 BACTEREMIA DUE TO PSEUDOMONAS: Status: ACTIVE | Noted: 2018-11-15

## 2022-03-18 PROBLEM — Z98.890 STATUS POST LAMINECTOMY: Status: ACTIVE | Noted: 2017-10-17

## 2022-03-18 PROBLEM — R00.1 BRADYCARDIA: Status: ACTIVE | Noted: 2021-06-11

## 2022-03-18 PROBLEM — G61.81 CIDP (CHRONIC INFLAMMATORY DEMYELINATING POLYNEUROPATHY) (HCC): Status: ACTIVE | Noted: 2018-09-26

## 2022-03-18 PROBLEM — R26.9 ABNORMAL GAIT: Status: ACTIVE | Noted: 2018-07-13

## 2022-03-18 PROBLEM — B96.5 BACTEREMIA DUE TO PSEUDOMONAS: Status: ACTIVE | Noted: 2018-11-15

## 2022-03-18 PROBLEM — R31.9 HEMATURIA: Status: ACTIVE | Noted: 2017-10-17

## 2022-03-18 PROBLEM — Z86.711 HISTORY OF PULMONARY EMBOLISM: Status: ACTIVE | Noted: 2018-11-15

## 2022-03-18 PROBLEM — M54.50 CHRONIC MIDLINE LOW BACK PAIN WITHOUT SCIATICA: Status: ACTIVE | Noted: 2017-10-17

## 2022-03-19 PROBLEM — Z79.01 ANTICOAGULATED ON COUMADIN: Status: ACTIVE | Noted: 2020-06-18

## 2022-03-19 PROBLEM — R29.6 FREQUENT FALLS: Status: ACTIVE | Noted: 2017-12-20

## 2022-03-19 PROBLEM — Z86.19 HISTORY OF SEPSIS: Status: ACTIVE | Noted: 2018-11-15

## 2022-03-19 PROBLEM — R26.89 LOSS OF BALANCE: Status: ACTIVE | Noted: 2017-12-20

## 2022-03-19 PROBLEM — I51.7 ATRIAL ENLARGEMENT, RIGHT: Status: ACTIVE | Noted: 2019-09-09

## 2022-03-19 PROBLEM — R32 URINARY INCONTINENCE: Status: ACTIVE | Noted: 2021-01-20

## 2022-03-19 PROBLEM — G47.33 OBSTRUCTIVE SLEEP APNEA: Status: ACTIVE | Noted: 2019-05-17

## 2022-03-19 PROBLEM — I51.7 VENTRICULAR ENLARGEMENT, RIGHT: Status: ACTIVE | Noted: 2019-09-09

## 2022-03-19 PROBLEM — R26.2 DIFFICULTY IN WALKING: Status: ACTIVE | Noted: 2019-05-17

## 2022-03-19 PROBLEM — M54.16 SPINAL STENOSIS OF LUMBAR REGION WITH RADICULOPATHY: Status: ACTIVE | Noted: 2017-10-17

## 2022-03-19 PROBLEM — I34.0 NONRHEUMATIC MITRAL VALVE REGURGITATION: Status: ACTIVE | Noted: 2021-05-18

## 2022-03-19 PROBLEM — M54.9 BACK PAIN: Status: ACTIVE | Noted: 2018-04-14

## 2022-03-19 PROBLEM — Z86.718 PERSONAL HISTORY OF THROMBOEMBOLIC DISEASE: Status: ACTIVE | Noted: 2018-11-15

## 2022-03-19 PROBLEM — N20.1 URETERAL CALCULUS: Status: ACTIVE | Noted: 2018-04-18

## 2022-03-19 PROBLEM — Z91.81 AT RISK FOR FALLS: Status: ACTIVE | Noted: 2020-01-24
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
## 2022-03-19 PROBLEM — M80.08XD FRACTURE OF VERTEBRA DUE TO OSTEOPOROSIS WITH ROUTINE HEALING: Status: ACTIVE | Noted: 2020-06-18

## 2022-03-19 PROBLEM — M48.061 SPINAL STENOSIS OF LUMBAR REGION WITH RADICULOPATHY: Status: ACTIVE | Noted: 2017-10-17

## 2022-03-19 PROBLEM — Z98.890 HX OF DECOMPRESSIVE LUMBAR LAMINECTOMY: Status: ACTIVE | Noted: 2018-09-26

## 2022-03-19 PROBLEM — J98.4 RESTRICTIVE LUNG DISEASE: Status: ACTIVE | Noted: 2021-06-11
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
## 2022-03-19 PROBLEM — R26.81 UNSTEADY GAIT: Status: ACTIVE | Noted: 2020-02-24

## 2022-03-19 PROBLEM — R19.7 INTERMITTENT DIARRHEA: Status: ACTIVE | Noted: 2021-01-20

## 2022-03-20 PROBLEM — Z79.01 CHRONIC ANTICOAGULATION: Status: ACTIVE | Noted: 2019-01-16

## 2022-03-20 PROBLEM — S32.010A CLOSED COMPRESSION FRACTURE OF BODY OF L1 VERTEBRA (HCC): Status: ACTIVE | Noted: 2020-02-03

## 2022-03-20 PROBLEM — Z51.5 HOSPICE CARE PATIENT: Status: ACTIVE | Noted: 2020-03-30

## 2022-03-20 PROBLEM — R15.9 INCONTINENCE OF FECES: Status: ACTIVE | Noted: 2021-01-20

## 2022-03-20 PROBLEM — F39 MOOD DISORDER (HCC): Status: ACTIVE | Noted: 2019-09-09

## 2022-03-20 PROBLEM — R93.89 ABNORMAL CHEST CT: Status: ACTIVE | Noted: 2018-11-13

## 2022-04-04 ENCOUNTER — HOSPITAL ENCOUNTER (OUTPATIENT)
Dept: LAB | Age: 82
Discharge: HOME | End: 2022-04-04
Payer: MEDICARE

## 2022-04-04 PROCEDURE — 87070 CULTURE OTHR SPECIMN AEROBIC: CPT

## 2022-04-16 LAB
BACTERIA ISLT: ABNORMAL
BACTERIA SPEC CULT: ABNORMAL
GRAM STN SPEC: ABNORMAL
GRAM STN SPEC: ABNORMAL
RESULT 1, 080571: ABNORMAL
SERVICE CMNT-IMP: ABNORMAL
SPECIMEN SOURCE: ABNORMAL

## 2022-06-02 ENCOUNTER — PATIENT MESSAGE (OUTPATIENT)
Dept: INTERNAL MEDICINE CLINIC | Facility: CLINIC | Age: 82
End: 2022-06-02

## 2022-06-03 DIAGNOSIS — M54.50 MIDLINE LOW BACK PAIN WITHOUT SCIATICA, UNSPECIFIED CHRONICITY: Primary | ICD-10-CM

## 2022-06-03 NOTE — TELEPHONE ENCOUNTER
Patient requested tramadol through [facility] yesterday and today daughter called wanting Tramadol refills today. It has not been 24-72 hours after patient requested. Requested Prescriptions     Pending Prescriptions Disp Refills    traMADol (ULTRAM) 50 MG tablet 30 tablet 0     Sig: Take 1 tablet by mouth daily as needed for Pain for up to 30 days. Intended supply: 7 days. Take lowest dose possible to manage pain     Dose verified and patient is due for it.  [facility].

## 2022-06-07 RX ORDER — TRAMADOL HYDROCHLORIDE 50 MG/1
50 TABLET ORAL DAILY PRN
Qty: 30 TABLET | Refills: 0 | Status: SHIPPED | OUTPATIENT
Start: 2022-06-07 | End: 2022-07-07

## 2022-06-07 NOTE — TELEPHONE ENCOUNTER
I have reviewed the patients controlled substance prescription history, as maintained in the [facility] prescription monitoring program, so that the prescription(s) for a  controlled substance can be given.

## 2022-06-08 ENCOUNTER — TELEPHONE (OUTPATIENT)
Dept: INTERNAL MEDICINE CLINIC | Facility: CLINIC | Age: 82
End: 2022-06-08

## 2022-06-08 NOTE — TELEPHONE ENCOUNTER
Patient calling in checking on RX. [facility] was [facility] of [facility]. Stated he will go ahead and pick it up there. Will take [facility] out of chart.

## 2022-06-08 NOTE — TELEPHONE ENCOUNTER
From: Ross James  To: Dr. Yordan Melendez: 6/2/2022 1:08 PM EDT  Subject: Refill Tramadol & other news    Its time to refill my Tramadol     My BS has been elevated (over 200) in the mornings. Shreya Hu been feeling poorly increased weakness and watery stools (3x a day) for 2 weeks. Do I need to be tested for C-diff after all the Abx Ive had over the last several months for the sinus Infection - which is finally resolved!! Make me a sick visit to discuss. [facility] can come get a specimen container if I need it.    Thanks   Angie Chester

## 2022-06-13 ENCOUNTER — OFFICE VISIT (OUTPATIENT)
Dept: INTERNAL MEDICINE CLINIC | Facility: CLINIC | Age: 82
End: 2022-06-13
Payer: MEDICARE

## 2022-06-13 VITALS
HEART RATE: 91 BPM | BODY MASS INDEX: 36.12 KG/M2 | HEIGHT: 71 IN | OXYGEN SATURATION: 93 % | RESPIRATION RATE: 16 BRPM | DIASTOLIC BLOOD PRESSURE: 60 MMHG | SYSTOLIC BLOOD PRESSURE: 120 MMHG

## 2022-06-13 DIAGNOSIS — M79.651 PAIN IN BOTH THIGHS: ICD-10-CM

## 2022-06-13 DIAGNOSIS — G61.81 CIDP (CHRONIC INFLAMMATORY DEMYELINATING POLYNEUROPATHY) (HCC): ICD-10-CM

## 2022-06-13 DIAGNOSIS — M79.652 PAIN IN BOTH THIGHS: ICD-10-CM

## 2022-06-13 DIAGNOSIS — Z79.01 LONG TERM CURRENT USE OF ANTICOAGULANT: ICD-10-CM

## 2022-06-13 DIAGNOSIS — Z98.890 STATUS POST LAMINECTOMY: ICD-10-CM

## 2022-06-13 DIAGNOSIS — Z86.718 PERSONAL HISTORY OF THROMBOEMBOLIC DISEASE: ICD-10-CM

## 2022-06-13 DIAGNOSIS — R29.898 WEAKNESS OF BOTH UPPER EXTREMITIES: ICD-10-CM

## 2022-06-13 DIAGNOSIS — M62.89 PROXIMAL WEAKNESS OF EXTREMITY: ICD-10-CM

## 2022-06-13 DIAGNOSIS — E11.65 UNCONTROLLED TYPE 2 DIABETES MELLITUS WITH HYPERGLYCEMIA (HCC): Primary | ICD-10-CM

## 2022-06-13 PROCEDURE — 1036F TOBACCO NON-USER: CPT | Performed by: INTERNAL MEDICINE

## 2022-06-13 PROCEDURE — G8427 DOCREV CUR MEDS BY ELIG CLIN: HCPCS | Performed by: INTERNAL MEDICINE

## 2022-06-13 PROCEDURE — G9687 HOSPICE ANYTIME MSMT PER: HCPCS | Performed by: INTERNAL MEDICINE

## 2022-06-13 PROCEDURE — G9692 HOSP RECD BY PT DUR MSMT PER: HCPCS | Performed by: INTERNAL MEDICINE

## 2022-06-13 PROCEDURE — G8419 CALC BMI OUT NRM PARAM NOF/U: HCPCS | Performed by: INTERNAL MEDICINE

## 2022-06-13 PROCEDURE — 99215 OFFICE O/P EST HI 40 MIN: CPT | Performed by: INTERNAL MEDICINE

## 2022-06-13 RX ORDER — BLOOD SUGAR DIAGNOSTIC
STRIP MISCELLANEOUS
COMMUNITY
Start: 2022-04-24 | End: 2022-06-13 | Stop reason: SDUPTHER

## 2022-06-13 ASSESSMENT — PATIENT HEALTH QUESTIONNAIRE - PHQ9
2. FEELING DOWN, DEPRESSED OR HOPELESS: 0
SUM OF ALL RESPONSES TO PHQ QUESTIONS 1-9: 0
SUM OF ALL RESPONSES TO PHQ QUESTIONS 1-9: 0
1. LITTLE INTEREST OR PLEASURE IN DOING THINGS: 0
SUM OF ALL RESPONSES TO PHQ9 QUESTIONS 1 & 2: 0
SUM OF ALL RESPONSES TO PHQ QUESTIONS 1-9: 0
SUM OF ALL RESPONSES TO PHQ QUESTIONS 1-9: 0

## 2022-06-13 NOTE — PROGRESS NOTES
6/13/2022    Chief Complaint   Patient presents with    Diabetes     AM fasting 170's to 200's     Hip Pain    Anticoagulation     2.1 this morning     Diarrhea    Medication Refill     test strip        Assessment and plan     1. Uncontrolled type 2 diabetes mellitus with hyperglycemia (HCC)  -     ACCU-CHEK LEELA PLUS strip; Check BS once per day, Indication E11.9, Disp-100 each, R-3, DAWNormal  2. Pain in both thighs  Comments:  with changing position  3. Proximal weakness of extremity  Comments:  lower extremities  4. Weakness of both upper extremities  5. CIDP (chronic inflammatory demyelinating polyneuropathy) ([facility])  6. Status post laminectomy  7. Long term current use of anticoagulant  8. Personal history of thromboembolic disease  Comments:  INR is at goal      1. Diabetes - Uncontrolled   Today we discussed his diabetes. Would like hemoglobin A1c to be less than 8. Discussed changing his medications however after long discussion with his daughter and patient with no changes made to his medications today . Daughter was concerned about side effects of some medications and cost of others. Discussed SGLT2 agents and GLP-1 agents. Current plan for diabetes is to modify his diet. Continue medications. We will check her blood sugar more than once a day to see if he has low blood sugars during the day than his fasting blood sugars which are high. 2.  Patient does not have pain in his thighs whether he has proximal weakness of his lower extremities with pain within the quadricep muscles with elevation. Weakness in his left lower extremities is not new. This may be a result of his previous back problems as well as neuropathy. The pain in his thighs occur only with changing his position. Today I recommended physical therapy to help with strengthening both upper and lower extremities -patient was not open to the idea. Currently not interested in physical therapy.   I have asked him to reconsider and call the office if he changes mind and is willing to have physical therapy. Meanwhile we will try exercises at home. 3.  Diarrhea  Seems to be resolving. No new action today. 6/13/2022  have spent 40 to 54 minutes reviewing previous notes, test results and face to face (virtual) with the patient discussing the diagnosis and importance of compliance with the treatment plan as well as documenting on the day of the visit. Follow up   Return in about 4 weeks (around 7/11/2022) for Diabetes Mellitus VV. Subjective           HPI :     Mr. Jayden Newberry is here for a follow up visit accompanied by his daughter. Diabetes Mellitus   Compliant with medications but not with diet nor exercise. Did not exercise due to other medical conditions. Says his fasting blood sugar ranges 170- 200 mg/dl  No hypoglycemia. He has not been having any symptoms suggestive of hypoglycemia. Pain in lower extremities  He describes having hip pain, but points mainly towards his quadriceps. He is having pain when he tries to get up from sitting position or tries to sit from standing. Once he is completely end of his motion he has no pain. He is known to have CIDP neuropathy. He is not very ambulatory. He is using an elevated toilet seat with handles. He has some upper extremity pain with lifting his arms above his head. Long-term use of anticoagulation/history of recurrent thromboembolism  No symptoms suggestive of thromboembolic disease. He is compliant with Coumadin. He is checking his INR at home , INR this morning was 2.1. Diarrhea  He has been having diarrhea for about 3 weeks. Recently had C. difficile stool test done which was negative. His daughter says that he has been on antibiotics for about 50 days - due to a sinus infection. Saw ENT and sinus issue is better. Diarrhea 'is finally better' - stool  soft now rather than watery .     Interim   Had abscess and teeth pulled last week - doing better now . He has a follow up with Dentist.     Lab Results   Component Value Date    LABA1C 8.9 (H) 05/09/2022     Lab Results   Component Value Date     05/09/2022     Lab Results   Component Value Date    LDLCALC 86 05/09/2022     Lab Results   Component Value Date     05/09/2022    K 4.1 05/09/2022     05/09/2022    CO2 23 05/09/2022    BUN 19 05/09/2022    CREATININE 1.24 05/09/2022    GLUCOSE 196 (H) 05/09/2022    CALCIUM 9.5 05/09/2022    PROT 6.3 05/09/2022    LABALBU 4.3 05/09/2022    BILITOT 0.6 05/09/2022    ALKPHOS 55 05/09/2022    AST 26 05/09/2022    ALT 37 05/09/2022    GFRAA 71 01/11/2022    AGRATIO 2.2 05/09/2022    GLOB 3.3 10/06/2021       Lab Results   Component Value Date    WBC 5.7 05/09/2022    HGB 16.2 05/09/2022    HCT 48.7 05/09/2022    MCV 86 05/09/2022     (L) 05/09/2022     Review of Systems  Nil significant     Objective     Examination  /60   Pulse 91   Resp 16   Ht 5' 11\" (1.803 m)   SpO2 93%   BMI 36.12 kg/m²     Physical Exam  General appearance: Obese, Well looking , No distress Alert and oriented X 3. Well nourished and well hydrated, sitting in a chair, ambulates with a walker   Head: Normocephalic, atraumatic  Eyes: conjunctivae clear. Neck: Supple, No carotid bruit, no thyromegaly, no adenopathy  Resp: normal effort. Chest clear  Heart: RRR. Normal heart sounds . Abdomen: Soft, non-tender, no masses , no organomegaly. Normal bowel sounds  Musculoskeletal : NO HIP TENDERNESS. Decreased muscle bulk in lower extremities. Good range of movement. Power in lower extremities about 4/5. He is unable to stand from sitting  without using his arms to help him. Complains of pain and points to quadriceps area. He has similar problems with sitting from standing. Upper extremity : he exhibits weakness in upper extremities . Unsteady gait . Extremities: No edema  Neurology: no Focal deficits  Psychology: normal affect.  Mood is normal     Nano Ridley MD Bernardo Hess Peer

## 2022-06-18 PROBLEM — M17.11 PRIMARY OSTEOARTHRITIS OF RIGHT KNEE: Status: ACTIVE | Noted: 2019-09-30

## 2022-06-18 PROBLEM — R53.1 WEAKNESS GENERALIZED: Status: ACTIVE | Noted: 2018-11-09

## 2022-06-18 PROBLEM — G62.9 NEUROPATHY: Status: ACTIVE | Noted: 2018-07-13

## 2022-06-18 RX ORDER — BLOOD SUGAR DIAGNOSTIC
STRIP MISCELLANEOUS
Qty: 100 EACH | Refills: 3 | Status: SHIPPED | OUTPATIENT
Start: 2022-06-18

## 2022-07-08 ENCOUNTER — PATIENT MESSAGE (OUTPATIENT)
Dept: INTERNAL MEDICINE CLINIC | Facility: CLINIC | Age: 82
End: 2022-07-08

## 2022-07-08 DIAGNOSIS — M54.50 MIDLINE LOW BACK PAIN WITHOUT SCIATICA, UNSPECIFIED CHRONICITY: Primary | ICD-10-CM

## 2022-07-08 RX ORDER — TRAMADOL HYDROCHLORIDE 50 MG/1
50 TABLET ORAL DAILY PRN
Qty: 30 TABLET | Refills: 0 | Status: SHIPPED | OUTPATIENT
Start: 2022-07-08 | End: 2022-08-05 | Stop reason: SDUPTHER

## 2022-07-08 NOTE — TELEPHONE ENCOUNTER
From: Camilo Steiner  To: Dr. Peace Harvey: 7/8/2022 7:35 AM EDT  Subject: Refill time    Hello,   Please refill my Tram[facility] 50mg [facility] [address] [facility].      Thank you  Jermaine/Edie bahena

## 2022-07-11 ENCOUNTER — OFFICE VISIT (OUTPATIENT)
Dept: ENT CLINIC | Age: 82
End: 2022-07-11
Payer: MEDICARE

## 2022-07-11 VITALS — BODY MASS INDEX: 36.26 KG/M2 | WEIGHT: 259 LBS | RESPIRATION RATE: 17 BRPM | HEIGHT: 71 IN

## 2022-07-11 DIAGNOSIS — J32.9 CHRONIC RHINOSINUSITIS: ICD-10-CM

## 2022-07-11 DIAGNOSIS — J33.9 NASAL POLYPS: ICD-10-CM

## 2022-07-11 DIAGNOSIS — J30.9 ALLERGIC RHINITIS, UNSPECIFIED SEASONALITY, UNSPECIFIED TRIGGER: Primary | ICD-10-CM

## 2022-07-11 DIAGNOSIS — J34.3 HYPERTROPHY OF BOTH INFERIOR NASAL TURBINATES: ICD-10-CM

## 2022-07-11 DIAGNOSIS — J31.0 CHRONIC RHINOSINUSITIS: ICD-10-CM

## 2022-07-11 DIAGNOSIS — J34.89 NASAL OBSTRUCTION: ICD-10-CM

## 2022-07-11 DIAGNOSIS — J34.2 NASAL SEPTAL DEVIATION: ICD-10-CM

## 2022-07-11 PROCEDURE — G8427 DOCREV CUR MEDS BY ELIG CLIN: HCPCS | Performed by: STUDENT IN AN ORGANIZED HEALTH CARE EDUCATION/TRAINING PROGRAM

## 2022-07-11 PROCEDURE — 99214 OFFICE O/P EST MOD 30 MIN: CPT | Performed by: STUDENT IN AN ORGANIZED HEALTH CARE EDUCATION/TRAINING PROGRAM

## 2022-07-11 PROCEDURE — G9692 HOSP RECD BY PT DUR MSMT PER: HCPCS | Performed by: STUDENT IN AN ORGANIZED HEALTH CARE EDUCATION/TRAINING PROGRAM

## 2022-07-11 PROCEDURE — G8417 CALC BMI ABV UP PARAM F/U: HCPCS | Performed by: STUDENT IN AN ORGANIZED HEALTH CARE EDUCATION/TRAINING PROGRAM

## 2022-07-11 PROCEDURE — 1036F TOBACCO NON-USER: CPT | Performed by: STUDENT IN AN ORGANIZED HEALTH CARE EDUCATION/TRAINING PROGRAM

## 2022-07-11 PROCEDURE — 31231 NASAL ENDOSCOPY DX: CPT | Performed by: STUDENT IN AN ORGANIZED HEALTH CARE EDUCATION/TRAINING PROGRAM

## 2022-07-11 RX ORDER — IPRATROPIUM BROMIDE 21 UG/1
2 SPRAY, METERED NASAL 3 TIMES DAILY PRN
Qty: 2 EACH | Refills: 5 | Status: SHIPPED | OUTPATIENT
Start: 2022-07-11

## 2022-07-11 ASSESSMENT — ENCOUNTER SYMPTOMS
ABDOMINAL PAIN: 0
EYE DISCHARGE: 0
COUGH: 0

## 2022-07-11 NOTE — PROGRESS NOTES
[facility] [facility] Office Note    Patient: Nivia Pichardo  MRN: [id number]  : 1940  Gender:  male  Vital Signs: Resp 17   Ht 5' 11\" (1.803 m)   Wt 259 lb (117.5 kg)   BMI 36.12 kg/m²   Date: 2022    CC:   Chief Complaint   Patient presents with    Follow-up     Patient presents today with c/o facial soreness and post nasal drip . Patient states that the PND does not have foul taste/odor as it previously did. HPI:  Nivia Pichardo is a 80 y.o. male with a history of chronic rhinosinusitis since COVID in January. He has been on numerous rounds of antibiotics. He also recently had 2 teeth extracted. Since the most recent course of antibiotics he notes improvement in the malodorous smell and drainage. He does endorse bilateral nasal obstruction. He has TORSTEN and uses CPAP. His biggest complaint is postnasal drainage but he also has some facial pain and pressure. Past Medical History, Past Surgical History, Family history, Social History, and Medications were all reviewed with the patient today and updated as necessary.      No Known Allergies  Patient Active Problem List   Diagnosis    Abnormal gait    CIDP (chronic inflammatory demyelinating polyneuropathy) ([facility]    Lumbar stenosis    Hypertension    Bradycardia    Bilateral edema of lower extremity    Controlled type 2 diabetes mellitus with complication, without long-term current use of insulin ([facility])    PVC (premature ventricular contraction)    Chronic midline low back pain without sciatica    Status post laminectomy    Bacteremia due to Pseudomonas    History of pulmonary embolism    Chronic fatigue    HERNANDEZ (dyspnea on exertion)    Hematuria    Frequent falls    Spinal stenosis of lumbar region with radiculopathy    Weakness of lower extremity    Ventricular enlargement, right    Nonrheumatic mitral valve regurgitation    Enlarged prostate    Hx of decompressive lumbar laminectomy    Gastroesophageal reflux disease    Back pain    Restrictive lung disease    Urinary incontinence    Cervical spinal stenosis    Fall at home    Hypogonadism male    Atrial enlargement, right    Obesity    Background diabetic retinopathy ([facility]    Unsteady gait    At risk for falls    Intermittent diarrhea    Obstructive sleep apnea (adult) (pediatric)    Personal history of thromboembolic disease    Lower extremity edema    Benign prostatic hyperplasia    Anticoagulated on Coumadin    Fracture of vertebra due to osteoporosis with routine healing    Arthritis    History of sepsis    Difficulty in walking    Obstructive sleep apnea    Loss of balance    Ureteral calculus    Mood disorder ([facility])    Closed compression fracture of body of L1 vertebra ([facility])    Hospice care patient    Abnormal chest CT    Chronic anticoagulation    Incontinence of feces    CKD (chronic kidney disease)    Neuropathy    Primary osteoarthritis of right knee    Weakness generalized     Current Outpatient Medications   Medication Sig    ipratropium (ATROVENT) 0.03 % nasal spray 2 sprays by Each Nostril route 3 times daily as needed for Rhinitis    traMADol (ULTRAM) 50 MG tablet Take 1 tablet by mouth daily as needed for Pain for up to 30 days.     ACCU-CHEK LEELA PLUS strip Check BS once per day, Indication E11.9    Cholecalciferol 50 MCG (2000 UT) TABS Take by mouth    famotidine (PEPCID) 20 MG tablet Take 20 mg by mouth 2 times daily    furosemide (LASIX) 20 MG tablet Take 20 mg by mouth daily    metFORMIN (GLUCOPHAGE-XR) 500 MG extended release tablet Take 2 tablet in the morning and 2 in the evening    metoprolol tartrate (LOPRESSOR) 25 MG tablet Take 25 mg by mouth 2 times daily    potassium chloride (KLOR-CON M) 10 MEQ extended release tablet Take 10 mEq by mouth 2 times daily    pravastatin (PRAVACHOL) 80 MG tablet Take 80 mg by mouth    tamsulosin (FLOMAX) 0.4 MG capsule Take 0.4 mg by mouth daily    triamcinolone (NASACORT) 55 MCG/ACT nasal inhaler 1 spray in each nostril twice a day as needed    warfarin (COUMADIN) 5 MG tablet Take Coumadin 7.5 mg once a day on Mondays ,Wednesdays and Fridays and 5 mg once a day on other days     No current facility-administered medications for this visit. Past Medical History:   Diagnosis Date    Arrhythmia     Arthritis 6/23/2015    BDR (background diabetic retinopathy) ([facility] [address] 6/23/2015    Depression 6/23/2015    Diabetes mellitus type 2, controlled ([facility] [facility]     Diabetic retinopathy ([facility] [address] [facility] 6/23/2015    Heartburn 6/23/2015    Hyperlipidemia 6/23/2015    Right-sided heart failure (HCC)     Sleep apnea     Spinal stenosis     Spinal stenosis of lumbar region with radiculopathy 10/17/2017    Status post laminectomy 10/17/2017    status post L3-4, L4-5 decompressive laminectomy with excision of disc on 04/06/2016       Type II or unspecified type diabetes mellitus without mention of complication, not stated as uncontrolled      Social History     Tobacco Use    Smoking status: Never Smoker    Smokeless tobacco: Never Used   Substance Use Topics    Alcohol use: No     Past Surgical History:   Procedure Laterality Date    CATARACT REMOVAL Left     COLONOSCOPY  10/10/2017    Moderate diverticulosis other side normal fiding.  no more rpt    CYST REMOVAL      CYST REMOVAL      HEENT Right 2014    Cell removal; cheek    ORTHOPEDIC SURGERY  04/05/2016    back surgery Decompressive Laminectomy Lumbar     Family History   Problem Relation Age of Onset    Heart Disease Maternal Grandfather     Heart Disease Paternal Grandmother     Stroke Paternal [de-identified]     Stroke Paternal Grandfather     Dementia Maternal Grandmother     Cancer Father         Prostate    Diabetes Father     Hypertension Father     Cancer Mother         Prostate    Breast Cancer Mother     Osteoarthritis Mother     COPD Mother     Thyroid Disease Mother     Heart Disease Mother  Alzheimer's Disease Maternal Grandmother         ROS:    Review of Systems   Constitutional: Negative for fever. HENT: Positive for postnasal drip. Negative for ear discharge and ear pain. Eyes: Negative for discharge. Respiratory: Negative for cough. Cardiovascular: Negative for chest pain. Gastrointestinal: Negative for abdominal pain. Endocrine: Negative for cold intolerance. Genitourinary: Negative for difficulty urinating. Musculoskeletal: Negative for neck pain. Skin: Negative for rash. Allergic/Immunologic: Negative for environmental allergies. Neurological: Negative for dizziness. Hematological: Negative for adenopathy. Psychiatric/Behavioral: Negative for agitation. PHYSICAL EXAM:    Resp 17   Ht 5' 11\" (1.803 m)   Wt 259 lb (117.5 kg)   BMI 36.12 kg/m²     General: NAD, well-appearing  Neuro: No gross neuro deficits. CN's II-XII intact. No facial weakness. Eyes: EOMI. Pupils reactive. No periorbital edema/ecchymosis. Skin: No facial erythema, rashes or concerning lesions. Nose: No external deviations or saddling. Intranasally, septum is deviated to the left without perforations, and septum abuts the uncinate, bilateral inferior turbinate hypertrophy. Mouth: Moist mucus membranes, normal tongue/palate mobility, no concerning mucosal lesions. Oropharynx: clear with no erythema/exudate, no tonsillar hypertrophy. Ears: Normal appearing auricles, no hematomas. EACs clear with no cerumen impaction, healthy canal skin, TM's intact with no perforations or retraction pockets. No middle ear effusions. Neck: Soft, supple, no palpable lateral neck masses. No parotid or submandibular masses. No thyromegaly or palpable thyroid nodules. No surgical scars. Lymphatics: No palpable cervical LAD. Resp/Lungs: No audible stridor or wheezing, CTAB  Heart: RRR  Extremities: No clubbing or cyanosis.     PROCEDURE: Nasal endoscopy  INDICATIONS: nasal obstruction  DESCRIPTION: After verbal consent was obtained and a timeout was performed, the 0 degree rigid nasal endoscope was advanced into both nares. The septum was deviated to the left w/ no perforations. Septum abuts the uncinate on the left. Bilateral inferior turbinate hypertrophy. There were no masses seen along the nasal floor or within the nasopharynx. On the R side, the mucosa was healthy and the turbinates were intact. The middle meatus showed a small mount of polyposis and edema. . On the L side, the mucosa was healthy and the turbinates were intact. The middle meatus showed no edema. The sphenoethmoidal recess was examined bilaterally and there was polyposis on the right side. The scope was then removed and the patient tolerated the procedure well w/ no complications. Lab Results   Component Value Date    WBC 5.7 05/09/2022    HGB 16.2 05/09/2022    HCT 48.7 05/09/2022    MCV 86 05/09/2022     (L) 05/09/2022     Lab Results   Component Value Date/Time     05/09/2022 09:02 AM    K 4.1 05/09/2022 09:02 AM     05/09/2022 09:02 AM    CO2 23 05/09/2022 09:02 AM    BUN 19 05/09/2022 09:02 AM    CREATININE 1.24 05/09/2022 09:02 AM    GLUCOSE 196 05/09/2022 09:02 AM    CALCIUM 9.5 05/09/2022 09:02 AM          ASSESSMENT and PLAN  80year-old gentleman with chronic rhinosinusitis with nasal polyposis. I will get a CT scan of his sinuses to further work this up. I will give him Atrovent for his postnasal drainage. I also recommended nasal saline sprays. I will notify him of results of CT scan when it returns. ICD-10-CM    1. Allergic rhinitis, unspecified seasonality, unspecified trigger  J30.9    2. Hypertrophy of both inferior nasal turbinates  J34.3    3. Nasal obstruction  J34.89 NASAL ENDOSCOPY,DX   4. Nasal septal deviation  J34.2    5. Nasal polyps  J33.9 CT SINUS WO CONTRAST   6.  Chronic rhinosinusitis  J31.0 CT SINUS WO CONTRAST    J32.9          Dexter Pantoja MD  7/11/2022  Electronically signed

## 2022-07-15 ENCOUNTER — HOSPITAL ENCOUNTER (OUTPATIENT)
Dept: CT IMAGING | Age: 82
Discharge: HOME | End: 2022-07-17
Payer: MEDICARE

## 2022-07-15 DIAGNOSIS — J33.9 NASAL POLYPS: ICD-10-CM

## 2022-07-15 DIAGNOSIS — J31.0 CHRONIC RHINOSINUSITIS: ICD-10-CM

## 2022-07-15 DIAGNOSIS — J32.9 CHRONIC RHINOSINUSITIS: ICD-10-CM

## 2022-07-15 PROCEDURE — 70486 CT MAXILLOFACIAL W/O DYE: CPT

## 2022-07-16 ENCOUNTER — TELEPHONE (OUTPATIENT)
Dept: ENT CLINIC | Age: 82
End: 2022-07-16

## 2022-07-16 NOTE — TELEPHONE ENCOUNTER
EXAM: CT facial sinuses without contrast.  INDICATION: Nasal polyp. Preoperative evaluation. Electronic medical record  currently not available. COMPARISON: None. Multiple high resolution axial images were obtained through the paranasal  sinuses. Coronal reformats were also evaluated. Radiation dose reduction  techniques were used for this study: All CT scans performed at this facility  use one or all of the following: Automated exposure control, adjustment of the  mA and/or kVp according to patient's size, iterative reconstruction. FINDINGS:  Minimal mucosal thickening of the left frontal sinus and scattered mucosal  thickening of the ethmoidal air cells. There is complete opacification of the  left maxillary sinus with a somewhat expanded appearance of the left maxillary  sinus. There is mucosal opacification of the sphenoethmoidal recesses without  significant mucosal thickening of the sphenoid sinuses. There is leftward nasal  septal deviation. Bilateral lens replacements. Diffuse cerebral atrophy evident  with ex vacuo dilatation of the ventricles. Mastoid air cells and middle ears  are clear. There is opacification of the left maxillary infundibulum. Middle  turbinectomy evident. The orbits are normal. Imaged soft tissues of the face and  neck appear within normal limits. IMPRESSION:  1. Complete opacification left maxillary sinus with an expanded appearance  along the medial portion with opacification of the left maxillary infundibula. 82-year-old gentleman with chronic left maxillary, anterior ethmoid, and frontal sinusitis. He has failed numerous courses of antibiotics and steroids. I have recommended left maxillary antrostomy with tissue removal, left anterior ethmoidectomy, and left frontal balloon sinuplasty.  I discussed all the risks of surgery including bleeding, infection, continued sinonasal symptoms, CSF leak, damage to orbit w/ vision changes, and need for further procedures and they would like to proceed.       Lily Alonso MD

## 2022-07-21 ENCOUNTER — PREP FOR PROCEDURE (OUTPATIENT)
Dept: ENT CLINIC | Age: 82
End: 2022-07-21

## 2022-07-21 ENCOUNTER — PATIENT MESSAGE (OUTPATIENT)
Dept: INTERNAL MEDICINE CLINIC | Facility: CLINIC | Age: 82
End: 2022-07-21

## 2022-07-21 PROBLEM — J32.4 CHRONIC PANSINUSITIS: Status: ACTIVE | Noted: 2022-07-21

## 2022-07-22 RX ORDER — FAMOTIDINE 20 MG/1
20 TABLET, FILM COATED ORAL 2 TIMES DAILY
Qty: 180 TABLET | Refills: 3 | Status: SHIPPED | OUTPATIENT
Start: 2022-07-22

## 2022-07-22 NOTE — TELEPHONE ENCOUNTER
From: Nadeen Chung  To: Dr. Rebecca New: 7/21/2022 7:39 PM EDT  Subject: Refill    Hello My FAMOTIDINE 20 mg is out of refills. I have sinus surgery scheduled for 9/23/22. If you think I need any preop screening from cardiology, let me know. Last time we saw Dr Dequan Costello, he said the PVCissue would not disqualify me for surgery. I have to stop Coumadin for 1 week. 9/19-26.      Thank you,   Yareli Brewer

## 2022-08-05 DIAGNOSIS — M54.50 MIDLINE LOW BACK PAIN WITHOUT SCIATICA, UNSPECIFIED CHRONICITY: ICD-10-CM

## 2022-08-05 RX ORDER — TRAMADOL HYDROCHLORIDE 50 MG/1
50 TABLET ORAL DAILY PRN
Qty: 30 TABLET | Refills: 0 | Status: SHIPPED | OUTPATIENT
Start: 2022-08-06 | End: 2022-09-05

## 2022-08-17 ENCOUNTER — PATIENT MESSAGE (OUTPATIENT)
Dept: ENT CLINIC | Age: 82
End: 2022-08-17

## 2022-08-18 RX ORDER — AMOXICILLIN AND CLAVULANATE POTASSIUM 875; 125 MG/1; MG/1
1 TABLET, FILM COATED ORAL 2 TIMES DAILY
Qty: 14 TABLET | Refills: 0 | Status: SHIPPED | OUTPATIENT
Start: 2022-08-18 | End: 2022-08-25

## 2022-09-13 DIAGNOSIS — M54.50 MIDLINE LOW BACK PAIN WITHOUT SCIATICA, UNSPECIFIED CHRONICITY: ICD-10-CM

## 2022-09-13 RX ORDER — TRAMADOL HYDROCHLORIDE 50 MG/1
50 TABLET ORAL DAILY PRN
Qty: 30 TABLET | Refills: 0 | Status: SHIPPED | OUTPATIENT
Start: 2022-09-15 | End: 2022-10-11 | Stop reason: SDUPTHER

## 2022-09-13 NOTE — TELEPHONE ENCOUNTER
Patient needs refill of Tramadol. I have reviewed the patients controlled substance prescription history, as maintained in the [place] prescription monitoring program, so that the prescription(s) for a  controlled substance can be given.

## 2022-09-14 ENCOUNTER — TELEMEDICINE (OUTPATIENT)
Dept: INTERNAL MEDICINE CLINIC | Facility: CLINIC | Age: 82
End: 2022-09-14
Payer: MEDICARE

## 2022-09-14 ENCOUNTER — TELEPHONE (OUTPATIENT)
Dept: ENT CLINIC | Age: 82
End: 2022-09-14

## 2022-09-14 DIAGNOSIS — E11.65 TYPE 2 DIABETES MELLITUS WITH HYPERGLYCEMIA, WITHOUT LONG-TERM CURRENT USE OF INSULIN (HCC): Primary | ICD-10-CM

## 2022-09-14 PROCEDURE — 1036F TOBACCO NON-USER: CPT | Performed by: INTERNAL MEDICINE

## 2022-09-14 PROCEDURE — G8428 CUR MEDS NOT DOCUMENT: HCPCS | Performed by: INTERNAL MEDICINE

## 2022-09-14 PROCEDURE — 99212 OFFICE O/P EST SF 10 MIN: CPT | Performed by: INTERNAL MEDICINE

## 2022-09-14 PROCEDURE — G9692 HOSP RECD BY PT DUR MSMT PER: HCPCS | Performed by: INTERNAL MEDICINE

## 2022-09-14 PROCEDURE — G8417 CALC BMI ABV UP PARAM F/U: HCPCS | Performed by: INTERNAL MEDICINE

## 2022-09-14 PROCEDURE — G9687 HOSPICE ANYTIME MSMT PER: HCPCS | Performed by: INTERNAL MEDICINE

## 2022-09-14 RX ORDER — AMOXICILLIN AND CLAVULANATE POTASSIUM 875; 125 MG/1; MG/1
1 TABLET, FILM COATED ORAL 2 TIMES DAILY
Qty: 14 TABLET | Refills: 0 | Status: SHIPPED | OUTPATIENT
Start: 2022-09-14 | End: 2022-09-21

## 2022-09-14 ASSESSMENT — PATIENT HEALTH QUESTIONNAIRE - PHQ9
SUM OF ALL RESPONSES TO PHQ9 QUESTIONS 1 & 2: 0
SUM OF ALL RESPONSES TO PHQ QUESTIONS 1-9: 0
2. FEELING DOWN, DEPRESSED OR HOPELESS: 0
1. LITTLE INTEREST OR PLEASURE IN DOING THINGS: 0
SUM OF ALL RESPONSES TO PHQ QUESTIONS 1-9: 0

## 2022-09-14 NOTE — TELEPHONE ENCOUNTER
Patient called stating they have a sinus infection and would like an antibiotic before his surgery on 9/23

## 2022-09-14 NOTE — PROGRESS NOTES
2022    Jermaine Ram (: 1940) is a [de-identified] y.o. male, Established patientpatient, here for evaluation of the following chief complaint(s):     Chief Complaint   Patient presents with    Follow-up Chronic Condition     Diabetes f/u AM fasting 200's         Assessment & Plan:     1. Type 2 diabetes mellitus with hyperglycemia, without long-term current use of insulin (HCC)    Patient's blood sugar has been elevated in the 200s associated signs of infection. ENT doctor's notes were reviewed. Patient does seem to be having recurrent sinus infection and is due for sinus surgery soon. He is compliant with metformin at maximum dose. Discussed with patient and his daughter. No changes in medications for now. Especially since he tends to fluctuate between normal and elevated blood sugars. He is encouraged to monitor his blood sugar and follow diabetic diet and maintain good oral fluid intake. Call MD if blood sugars averaging 200 mg/dL. He is expecting to start antibiotics soon hopefully om=nce starting antibiotics his BS will improve. Return in about 4 weeks (around 10/12/2022) for Diabetes Mellitus , Virtual Visit. Subjective:     HPI  Patient is here for follow-up of diabetes accompanied by his daughter. Patient and his daughter have noticed that his blood sugar is fluctuating. Blood sugar tends to be greater than 200 mg/dL. Since 9/3/2022 he has been having recurrent sinus infections. He noticed that when he has infection of his blood sugar is elevated. Untreated and improved his blood sugar is in the usual range. He has been compliant with his medications. This has been going on off and on since 2022. Is under the care of ENT for sinus infection and is scheduled to have sinus surgery next Friday.       Review of Systems   Nil significant    Objective:     Lui Foreman is being evaluated by a Virtual Visit (video visit) encounter to address concerns as mentioned above. A caregiver was present when appropriate. Due to this being a TeleHealth encounter (During [facility] public health emergency), evaluation of the following organ systems was limited: Vitals/Constitutional/EENT/Resp/CV/GI//MS/Neuro/Skin/Heme-Lymph-Imm. No flowsheet data found. Physical Exam  General appearance:Well looking , No distress Alert and oriented X 3. Well nourished and well hydrated  Head: Normocephalic, atraumatic  Eyes: conjunctivae clear. Resp: normal effort  Neurology: no Facial asymmetry   Psychology: normal affect. Mood is normal    Jermaine Manuel  was evaluated through a synchronous (real-time) audio-video encounter. The patient (or guardian if applicable) is aware that this is a billable service, which includes applicable co-pays. This Virtual Visit was conducted with patient's (and/or legal guardian's) consent. The visit was conducted pursuant to the emergency declaration under the [address] [place] [address] authority and the [employer] General Act. Patient identification was verified, and a caregiver was present when appropriate. The patient was located at Other: [place] .   Provider was located at [facility] ([place] [address]): [address],  [id number] [place] Ion Nagy.        Elen Bell MD [facility]

## 2022-09-21 ENCOUNTER — PREP FOR PROCEDURE (OUTPATIENT)
Dept: SURGERY | Age: 82
End: 2022-09-21

## 2022-09-21 NOTE — PERIOP NOTE
Patient's daughter verified name and . Order for consent not found in EHR   Type 1b surgery, PAT assessment complete. Orders not received. Labs per surgeon: None  Labs per anesthesia protocol: Potassium to be drawn  DOS    Patient answered medical/surgical history questions at their best of ability. All prior to admission medications documented in [facility]. Patient instructed to take the following medications the day of surgery according to anesthesia guidelines with a small sip of water: Pepcid and Metoprolol. Hold all vitamins 7 days prior to surgery and NSAIDS 5 days prior to surgery. Prescription meds to hold:pt's daughter states last dose of Coumadin 2022  Patient instructed on the following:    > Arrive at A Entrance, time of arrival to be called the day before by 1700  > NPO after midnight, unless otherwise indicated, including gum, mints, and ice chips  > Responsible adult must drive patient to the hospital, stay during surgery, and patient will need supervision 24 hours after anesthesia  > Use antibacterial soap in shower the night before surgery and on the morning of surgery  > All piercings must be removed prior to arrival.    > Leave all valuables (money and jewelry) at home but bring insurance card and ID on DOS.   > You may be required to pay a deductible or co-pay on the day of your procedure. You can pre-pay by calling [phone] if your surgery is at the [facility] or [phone] if your surgery is at the [facility]. > Do not wear make-up, nail polish, lotions, cologne, perfumes, powders, or oil on skin. Artificial nails are not permitted.

## 2022-09-22 ENCOUNTER — ANESTHESIA EVENT (OUTPATIENT)
Dept: SURGERY | Age: 82
End: 2022-09-22
Payer: MEDICARE

## 2022-09-22 NOTE — DISCHARGE INSTRUCTIONS
Nasal/Sinus Surgery      -If needed after surgery, sleep and rest with your head elevated to decrease swelling and pressure.    -The first day or two after surgery, you may have some mild bloody drainage. You can wear a gauze pad under your nose to catch these drippings. This can be changed as needed. CALL THE OFFICE IF YOU HAVE ANY HEAVY OR PERSISTENT BLEEDING    -Numbness to the front teeth, roof of mouth is normal after nasal surgery, and will resolve after about 8 weeks. -VERY IMPORTANT. Nasal irrigation with a normal saline rinse after surgery is important after surgery. This is begun the day of surgery. You cannot do this too much. At a minimum of 5 time a day. -You may have splints in your nose; these need to be kept clean. Irrigating the nose helps keep them clean and open. -You may irrigate blood clots from your nose for up to 2 weeks after surgery. IRRIGATION IS VERY IMPORTANT. You may use additionally use Afrin 2-3 times daily for the first 3 days to decrease congestion and bleeding.      -Do not blow your nose until you've had your post-operative visit and gotten clearance from your doctor.    -If you need to sneeze, sneeze with your mouth open. After general anesthesia or intravenous sedation, for 24 hours or while taking prescription Narcotics:  Limit your activities  A responsible adult needs to be with you for the next 24 hours  Do not drive and operate hazardous machinery  Do not make important personal or business decisions  Do not drink alcoholic beverages  If you have not urinated within 8 hours after discharge, and you are experiencing discomfort from urinary retention, please go to the nearest ED. If you have sleep apnea and have a CPAP machine, please use it for all naps and sleeping. Please use caution when taking narcotics and any of your home medications that may cause drowsiness. *  Please give a list of your current medications to your Primary Care Provider.   * Please update this list whenever your medications are discontinued, doses are      changed, or new medications (including over-the-counter products) are added. *  Please carry medication information at all times in case of emergency situations. These are general instructions for a healthy lifestyle:  No smoking/ No tobacco products/ Avoid exposure to second hand smoke  Surgeon General's Warning:  Quitting smoking now greatly reduces serious risk to your health. Obesity, smoking, and sedentary lifestyle greatly increases your risk for illness  A healthy diet, regular physical exercise & weight monitoring are important for maintaining a healthy lifestyle    You may be retaining fluid if you have a history of heart failure or if you experience any of the following symptoms:  Weight gain of 3 pounds or more overnight or 5 pounds in a week, increased swelling in our hands or feet or shortness of breath while lying flat in bed. Please call your doctor as soon as you notice any of these symptoms; do not wait until your next office visit.

## 2022-09-23 ENCOUNTER — ANESTHESIA (OUTPATIENT)
Dept: SURGERY | Age: 82
End: 2022-09-23
Payer: MEDICARE

## 2022-09-23 ENCOUNTER — HOSPITAL ENCOUNTER (OUTPATIENT)
Age: 82
Setting detail: OUTPATIENT SURGERY
Discharge: HOME | End: 2022-09-23
Attending: STUDENT IN AN ORGANIZED HEALTH CARE EDUCATION/TRAINING PROGRAM | Admitting: STUDENT IN AN ORGANIZED HEALTH CARE EDUCATION/TRAINING PROGRAM
Payer: MEDICARE

## 2022-09-23 VITALS
BODY MASS INDEX: 37.68 KG/M2 | TEMPERATURE: 97.7 F | SYSTOLIC BLOOD PRESSURE: 146 MMHG | HEIGHT: 70 IN | OXYGEN SATURATION: 92 % | WEIGHT: 263.2 LBS | RESPIRATION RATE: 16 BRPM | HEART RATE: 61 BPM | DIASTOLIC BLOOD PRESSURE: 70 MMHG

## 2022-09-23 DIAGNOSIS — J32.4 CHRONIC PANSINUSITIS: ICD-10-CM

## 2022-09-23 DIAGNOSIS — G89.18 POST-OP PAIN: Primary | ICD-10-CM

## 2022-09-23 PROBLEM — J32.1 CHRONIC FRONTAL SINUSITIS: Status: ACTIVE | Noted: 2022-09-23

## 2022-09-23 PROBLEM — J32.2 CHRONIC ETHMOIDAL SINUSITIS: Status: ACTIVE | Noted: 2022-09-23

## 2022-09-23 PROBLEM — J32.0 CHRONIC MAXILLARY SINUSITIS: Status: ACTIVE | Noted: 2022-09-23

## 2022-09-23 LAB
GLUCOSE BLD STRIP.AUTO-MCNC: 220 MG/DL (ref 65–100)
GLUCOSE BLD STRIP.AUTO-MCNC: 224 MG/DL (ref 65–100)
INR PPP: 1
POTASSIUM SERPL-SCNC: 4.1 MMOL/L (ref 3.5–5.1)
PROTHROMBIN TIME: 14 SEC (ref 12.6–14.5)
SERVICE CMNT-IMP: ABNORMAL
SERVICE CMNT-IMP: ABNORMAL

## 2022-09-23 PROCEDURE — A4216 STERILE WATER/SALINE, 10 ML: HCPCS | Performed by: STUDENT IN AN ORGANIZED HEALTH CARE EDUCATION/TRAINING PROGRAM

## 2022-09-23 PROCEDURE — 85610 PROTHROMBIN TIME: CPT

## 2022-09-23 PROCEDURE — 3600000004 HC SURGERY LEVEL 4 BASE: Performed by: STUDENT IN AN ORGANIZED HEALTH CARE EDUCATION/TRAINING PROGRAM

## 2022-09-23 PROCEDURE — 6360000002 HC RX W HCPCS: Performed by: NURSE ANESTHETIST, CERTIFIED REGISTERED

## 2022-09-23 PROCEDURE — 2580000003 HC RX 258: Performed by: STUDENT IN AN ORGANIZED HEALTH CARE EDUCATION/TRAINING PROGRAM

## 2022-09-23 PROCEDURE — 3700000001 HC ADD 15 MINUTES (ANESTHESIA): Performed by: STUDENT IN AN ORGANIZED HEALTH CARE EDUCATION/TRAINING PROGRAM

## 2022-09-23 PROCEDURE — C2625 STENT, NON-COR, TEM W/DEL SY: HCPCS | Performed by: STUDENT IN AN ORGANIZED HEALTH CARE EDUCATION/TRAINING PROGRAM

## 2022-09-23 PROCEDURE — C1726 CATH, BAL DIL, NON-VASCULAR: HCPCS | Performed by: STUDENT IN AN ORGANIZED HEALTH CARE EDUCATION/TRAINING PROGRAM

## 2022-09-23 PROCEDURE — 3600000014 HC SURGERY LEVEL 4 ADDTL 15MIN: Performed by: STUDENT IN AN ORGANIZED HEALTH CARE EDUCATION/TRAINING PROGRAM

## 2022-09-23 PROCEDURE — 2500000003 HC RX 250 WO HCPCS: Performed by: NURSE ANESTHETIST, CERTIFIED REGISTERED

## 2022-09-23 PROCEDURE — 7100000011 HC PHASE II RECOVERY - ADDTL 15 MIN: Performed by: STUDENT IN AN ORGANIZED HEALTH CARE EDUCATION/TRAINING PROGRAM

## 2022-09-23 PROCEDURE — 6370000000 HC RX 637 (ALT 250 FOR IP): Performed by: STUDENT IN AN ORGANIZED HEALTH CARE EDUCATION/TRAINING PROGRAM

## 2022-09-23 PROCEDURE — 88304 TISSUE EXAM BY PATHOLOGIST: CPT

## 2022-09-23 PROCEDURE — 6370000000 HC RX 637 (ALT 250 FOR IP): Performed by: ANESTHESIOLOGY

## 2022-09-23 PROCEDURE — 7100000000 HC PACU RECOVERY - FIRST 15 MIN: Performed by: STUDENT IN AN ORGANIZED HEALTH CARE EDUCATION/TRAINING PROGRAM

## 2022-09-23 PROCEDURE — 87205 SMEAR GRAM STAIN: CPT

## 2022-09-23 PROCEDURE — 2709999900 HC NON-CHARGEABLE SUPPLY: Performed by: STUDENT IN AN ORGANIZED HEALTH CARE EDUCATION/TRAINING PROGRAM

## 2022-09-23 PROCEDURE — 2580000003 HC RX 258: Performed by: ANESTHESIOLOGY

## 2022-09-23 PROCEDURE — 87185 SC STD ENZYME DETCJ PER NZM: CPT

## 2022-09-23 PROCEDURE — 2720000010 HC SURG SUPPLY STERILE: Performed by: STUDENT IN AN ORGANIZED HEALTH CARE EDUCATION/TRAINING PROGRAM

## 2022-09-23 PROCEDURE — 2500000003 HC RX 250 WO HCPCS: Performed by: STUDENT IN AN ORGANIZED HEALTH CARE EDUCATION/TRAINING PROGRAM

## 2022-09-23 PROCEDURE — 7100000010 HC PHASE II RECOVERY - FIRST 15 MIN: Performed by: STUDENT IN AN ORGANIZED HEALTH CARE EDUCATION/TRAINING PROGRAM

## 2022-09-23 PROCEDURE — 31267 ENDOSCOPY MAXILLARY SINUS: CPT | Performed by: STUDENT IN AN ORGANIZED HEALTH CARE EDUCATION/TRAINING PROGRAM

## 2022-09-23 PROCEDURE — 87102 FUNGUS ISOLATION CULTURE: CPT

## 2022-09-23 PROCEDURE — 31296 NSL/SINS NDSC SURG FRNT SINS: CPT | Performed by: STUDENT IN AN ORGANIZED HEALTH CARE EDUCATION/TRAINING PROGRAM

## 2022-09-23 PROCEDURE — 87076 CULTURE ANAEROBE IDENT EACH: CPT

## 2022-09-23 PROCEDURE — 87075 CULTR BACTERIA EXCEPT BLOOD: CPT

## 2022-09-23 PROCEDURE — 88305 TISSUE EXAM BY PATHOLOGIST: CPT

## 2022-09-23 PROCEDURE — 3700000000 HC ANESTHESIA ATTENDED CARE: Performed by: STUDENT IN AN ORGANIZED HEALTH CARE EDUCATION/TRAINING PROGRAM

## 2022-09-23 PROCEDURE — 87186 SC STD MICRODIL/AGAR DIL: CPT

## 2022-09-23 PROCEDURE — 84132 ASSAY OF SERUM POTASSIUM: CPT

## 2022-09-23 PROCEDURE — 82962 GLUCOSE BLOOD TEST: CPT

## 2022-09-23 PROCEDURE — 31255 NSL/SINS NDSC W/TOT ETHMDCT: CPT | Performed by: STUDENT IN AN ORGANIZED HEALTH CARE EDUCATION/TRAINING PROGRAM

## 2022-09-23 PROCEDURE — 7100000001 HC PACU RECOVERY - ADDTL 15 MIN: Performed by: STUDENT IN AN ORGANIZED HEALTH CARE EDUCATION/TRAINING PROGRAM

## 2022-09-23 DEVICE — PROPEL CONTOUR SINUS IMPLANT
Type: IMPLANTABLE DEVICE | Site: NOSE | Status: FUNCTIONAL
Brand: PROPEL CONTOUR

## 2022-09-23 DEVICE — PROPEL SINUS IMPLANT
Type: IMPLANTABLE DEVICE | Site: NOSE | Status: FUNCTIONAL
Brand: PROPEL

## 2022-09-23 RX ORDER — HYDROMORPHONE HYDROCHLORIDE 1 MG/ML
0.5 INJECTION, SOLUTION INTRAMUSCULAR; INTRAVENOUS; SUBCUTANEOUS EVERY 5 MIN PRN
Status: DISCONTINUED | OUTPATIENT
Start: 2022-09-23 | End: 2022-09-23 | Stop reason: HOSPADM

## 2022-09-23 RX ORDER — SODIUM CHLORIDE 9 MG/ML
INJECTION, SOLUTION INTRAVENOUS PRN
Status: DISCONTINUED | OUTPATIENT
Start: 2022-09-23 | End: 2022-09-23 | Stop reason: HOSPADM

## 2022-09-23 RX ORDER — DIPHENHYDRAMINE HYDROCHLORIDE 50 MG/ML
12.5 INJECTION INTRAMUSCULAR; INTRAVENOUS
Status: DISCONTINUED | OUTPATIENT
Start: 2022-09-23 | End: 2022-09-23 | Stop reason: HOSPADM

## 2022-09-23 RX ORDER — ONDANSETRON 4 MG/1
4 TABLET, ORALLY DISINTEGRATING ORAL EVERY 8 HOURS PRN
Qty: 12 TABLET | Refills: 0 | Status: SHIPPED | OUTPATIENT
Start: 2022-09-23

## 2022-09-23 RX ORDER — GLYCOPYRROLATE 0.2 MG/ML
INJECTION INTRAMUSCULAR; INTRAVENOUS PRN
Status: DISCONTINUED | OUTPATIENT
Start: 2022-09-23 | End: 2022-09-23 | Stop reason: SDUPTHER

## 2022-09-23 RX ORDER — LIDOCAINE HYDROCHLORIDE 20 MG/ML
INJECTION, SOLUTION EPIDURAL; INFILTRATION; INTRACAUDAL; PERINEURAL PRN
Status: DISCONTINUED | OUTPATIENT
Start: 2022-09-23 | End: 2022-09-23 | Stop reason: SDUPTHER

## 2022-09-23 RX ORDER — MIDAZOLAM HYDROCHLORIDE 2 MG/2ML
2 INJECTION, SOLUTION INTRAMUSCULAR; INTRAVENOUS
Status: DISCONTINUED | OUTPATIENT
Start: 2022-09-23 | End: 2022-09-23 | Stop reason: HOSPADM

## 2022-09-23 RX ORDER — EPINEPHRINE NASAL SOLUTION 1 MG/ML
SOLUTION NASAL PRN
Status: DISCONTINUED | OUTPATIENT
Start: 2022-09-23 | End: 2022-09-23 | Stop reason: HOSPADM

## 2022-09-23 RX ORDER — PROCHLORPERAZINE EDISYLATE 5 MG/ML
5 INJECTION INTRAMUSCULAR; INTRAVENOUS
Status: DISCONTINUED | OUTPATIENT
Start: 2022-09-23 | End: 2022-09-23 | Stop reason: HOSPADM

## 2022-09-23 RX ORDER — LIDOCAINE HYDROCHLORIDE AND EPINEPHRINE 20; 5 MG/ML; UG/ML
INJECTION, SOLUTION EPIDURAL; INFILTRATION; INTRACAUDAL; PERINEURAL PRN
Status: DISCONTINUED | OUTPATIENT
Start: 2022-09-23 | End: 2022-09-23 | Stop reason: HOSPADM

## 2022-09-23 RX ORDER — ROCURONIUM BROMIDE 10 MG/ML
INJECTION, SOLUTION INTRAVENOUS PRN
Status: DISCONTINUED | OUTPATIENT
Start: 2022-09-23 | End: 2022-09-23 | Stop reason: SDUPTHER

## 2022-09-23 RX ORDER — PROPOFOL 10 MG/ML
INJECTION, EMULSION INTRAVENOUS PRN
Status: DISCONTINUED | OUTPATIENT
Start: 2022-09-23 | End: 2022-09-23 | Stop reason: SDUPTHER

## 2022-09-23 RX ORDER — LIDOCAINE HYDROCHLORIDE 10 MG/ML
1 INJECTION, SOLUTION INFILTRATION; PERINEURAL
Status: DISCONTINUED | OUTPATIENT
Start: 2022-09-23 | End: 2022-09-23 | Stop reason: HOSPADM

## 2022-09-23 RX ORDER — SODIUM CHLORIDE 0.9 % (FLUSH) 0.9 %
5-40 SYRINGE (ML) INJECTION EVERY 12 HOURS SCHEDULED
Status: DISCONTINUED | OUTPATIENT
Start: 2022-09-23 | End: 2022-09-23 | Stop reason: HOSPADM

## 2022-09-23 RX ORDER — OXYCODONE HYDROCHLORIDE AND ACETAMINOPHEN 5; 325 MG/1; MG/1
1 TABLET ORAL EVERY 6 HOURS PRN
Qty: 24 TABLET | Refills: 0 | Status: SHIPPED | OUTPATIENT
Start: 2022-09-23 | End: 2022-09-29

## 2022-09-23 RX ORDER — SODIUM CHLORIDE 0.9 % (FLUSH) 0.9 %
5-40 SYRINGE (ML) INJECTION PRN
Status: DISCONTINUED | OUTPATIENT
Start: 2022-09-23 | End: 2022-09-23 | Stop reason: HOSPADM

## 2022-09-23 RX ORDER — ONDANSETRON 2 MG/ML
INJECTION INTRAMUSCULAR; INTRAVENOUS PRN
Status: DISCONTINUED | OUTPATIENT
Start: 2022-09-23 | End: 2022-09-23 | Stop reason: SDUPTHER

## 2022-09-23 RX ORDER — APREPITANT 40 MG/1
40 CAPSULE ORAL ONCE
Status: DISCONTINUED | OUTPATIENT
Start: 2022-09-23 | End: 2022-09-23

## 2022-09-23 RX ORDER — ACETAMINOPHEN 500 MG
1000 TABLET ORAL ONCE
Status: COMPLETED | OUTPATIENT
Start: 2022-09-23 | End: 2022-09-23

## 2022-09-23 RX ORDER — FENTANYL CITRATE 50 UG/ML
INJECTION, SOLUTION INTRAMUSCULAR; INTRAVENOUS PRN
Status: DISCONTINUED | OUTPATIENT
Start: 2022-09-23 | End: 2022-09-23 | Stop reason: SDUPTHER

## 2022-09-23 RX ORDER — OXYCODONE HYDROCHLORIDE 5 MG/1
5 TABLET ORAL PRN
Status: COMPLETED | OUTPATIENT
Start: 2022-09-23 | End: 2022-09-23

## 2022-09-23 RX ORDER — INSULIN LISPRO 100 [IU]/ML
4 INJECTION, SOLUTION INTRAVENOUS; SUBCUTANEOUS ONCE
Status: COMPLETED | OUTPATIENT
Start: 2022-09-23 | End: 2022-09-23

## 2022-09-23 RX ORDER — SODIUM CHLORIDE, SODIUM LACTATE, POTASSIUM CHLORIDE, CALCIUM CHLORIDE 600; 310; 30; 20 MG/100ML; MG/100ML; MG/100ML; MG/100ML
INJECTION, SOLUTION INTRAVENOUS CONTINUOUS
Status: DISCONTINUED | OUTPATIENT
Start: 2022-09-23 | End: 2022-09-23 | Stop reason: HOSPADM

## 2022-09-23 RX ORDER — NEOSTIGMINE METHYLSULFATE 1 MG/ML
INJECTION, SOLUTION INTRAVENOUS PRN
Status: DISCONTINUED | OUTPATIENT
Start: 2022-09-23 | End: 2022-09-23 | Stop reason: SDUPTHER

## 2022-09-23 RX ORDER — SODIUM CHLORIDE 9 MG/ML
INJECTION INTRAVENOUS PRN
Status: DISCONTINUED | OUTPATIENT
Start: 2022-09-23 | End: 2022-09-23 | Stop reason: HOSPADM

## 2022-09-23 RX ORDER — AMOXICILLIN AND CLAVULANATE POTASSIUM 875; 125 MG/1; MG/1
1 TABLET, FILM COATED ORAL 2 TIMES DAILY
Qty: 14 TABLET | Refills: 0 | Status: SHIPPED | OUTPATIENT
Start: 2022-09-23 | End: 2022-09-30

## 2022-09-23 RX ORDER — ONDANSETRON 2 MG/ML
4 INJECTION INTRAMUSCULAR; INTRAVENOUS
Status: DISCONTINUED | OUTPATIENT
Start: 2022-09-23 | End: 2022-09-23 | Stop reason: HOSPADM

## 2022-09-23 RX ADMIN — PHENYLEPHRINE HYDROCHLORIDE 100 MCG: 0.1 INJECTION, SOLUTION INTRAVENOUS at 13:17

## 2022-09-23 RX ADMIN — NEOSTIGMINE METHYLSULFATE 3 MG: 1 INJECTION, SOLUTION INTRAVENOUS at 13:23

## 2022-09-23 RX ADMIN — GLYCOPYRROLATE 0.4 MG: 0.2 INJECTION, SOLUTION INTRAMUSCULAR; INTRAVENOUS at 13:23

## 2022-09-23 RX ADMIN — ROCURONIUM BROMIDE 30 MG: 50 INJECTION, SOLUTION INTRAVENOUS at 12:19

## 2022-09-23 RX ADMIN — FENTANYL CITRATE 50 MCG: 50 INJECTION, SOLUTION INTRAMUSCULAR; INTRAVENOUS at 12:19

## 2022-09-23 RX ADMIN — LIDOCAINE HYDROCHLORIDE 60 MG: 20 INJECTION, SOLUTION EPIDURAL; INFILTRATION; INTRACAUDAL; PERINEURAL at 12:19

## 2022-09-23 RX ADMIN — PROPOFOL 150 MG: 10 INJECTION, EMULSION INTRAVENOUS at 12:19

## 2022-09-23 RX ADMIN — FENTANYL CITRATE 25 MCG: 50 INJECTION, SOLUTION INTRAMUSCULAR; INTRAVENOUS at 13:08

## 2022-09-23 RX ADMIN — PHENYLEPHRINE HYDROCHLORIDE 100 MCG: 0.1 INJECTION, SOLUTION INTRAVENOUS at 12:33

## 2022-09-23 RX ADMIN — PHENYLEPHRINE HYDROCHLORIDE 100 MCG: 0.1 INJECTION, SOLUTION INTRAVENOUS at 12:19

## 2022-09-23 RX ADMIN — ONDANSETRON 4 MG: 2 INJECTION INTRAMUSCULAR; INTRAVENOUS at 12:31

## 2022-09-23 RX ADMIN — SODIUM CHLORIDE, POTASSIUM CHLORIDE, SODIUM LACTATE AND CALCIUM CHLORIDE: 600; 310; 30; 20 INJECTION, SOLUTION INTRAVENOUS at 10:54

## 2022-09-23 RX ADMIN — PHENYLEPHRINE HYDROCHLORIDE 100 MCG: 0.1 INJECTION, SOLUTION INTRAVENOUS at 12:50

## 2022-09-23 RX ADMIN — INSULIN LISPRO 4 UNITS: 100 INJECTION, SOLUTION INTRAVENOUS; SUBCUTANEOUS at 11:06

## 2022-09-23 RX ADMIN — ACETAMINOPHEN 1000 MG: 500 TABLET, FILM COATED ORAL at 11:06

## 2022-09-23 RX ADMIN — PHENYLEPHRINE HYDROCHLORIDE 100 MCG: 0.1 INJECTION, SOLUTION INTRAVENOUS at 12:25

## 2022-09-23 RX ADMIN — FENTANYL CITRATE 25 MCG: 50 INJECTION, SOLUTION INTRAMUSCULAR; INTRAVENOUS at 13:03

## 2022-09-23 RX ADMIN — PHENYLEPHRINE HYDROCHLORIDE 100 MCG: 0.1 INJECTION, SOLUTION INTRAVENOUS at 12:47

## 2022-09-23 RX ADMIN — OXYCODONE HYDROCHLORIDE 5 MG: 5 TABLET ORAL at 14:19

## 2022-09-23 ASSESSMENT — PAIN SCALES - GENERAL: PAINLEVEL_OUTOF10: 7

## 2022-09-23 ASSESSMENT — PAIN DESCRIPTION - LOCATION: LOCATION: MOUTH;NOSE

## 2022-09-23 ASSESSMENT — PAIN - FUNCTIONAL ASSESSMENT: PAIN_FUNCTIONAL_ASSESSMENT: 0-10

## 2022-09-23 ASSESSMENT — ENCOUNTER SYMPTOMS: SHORTNESS OF BREATH: 1

## 2022-09-23 ASSESSMENT — PAIN DESCRIPTION - DESCRIPTORS: DESCRIPTORS: ACHING;BURNING

## 2022-09-23 NOTE — OP NOTE
Operative Note      Patient: Abdiel Davis  YOB: 1940  MRN: [id number]    Date of Procedure: 9/23/2022    Pre-Op Diagnosis: Chronic left maxillary sinusitis, chronic left ethmoid sinusitis, chronic left frontal sinusitis, nasal polyposis    Post-Op Diagnosis: Chronic left maxillary sinusitis, chronic left ethmoid sinusitis, chronic left frontal sinusitis, nasal polyposis, left-sided nasal septal deviation       Procedure(s):  SINUS ENDOSCOPY FUNCTIONAL SURGERY BALLOON SINUPLASTY Left Maxillary Antrostomy with Tissue Removal, Left Ethmoidectomy, Left Frontal Balloon Sinuplasties    Surgeon(s):  Umm Sutton MD    Assistant:   * No surgical staff found *    Anesthesia: General    Estimated Blood Loss (mL): less than 845     Complications: None    Specimens:   ID Type Source Tests Collected by Time Destination   1 : left maxillary sinus Tissue Maxillary Sinus CULTURE, ANAEROBIC, CULTURE, FUNGUS Umm Sutton MD 9/23/2022 1246    A : right superior turbinate Tissue Sinus SURGICAL PATHOLOGY Umm Sutton MD 9/23/2022 1245        Implants:  * No implants in log *      Drains: * No LDAs found *    Findings: Chronic left maxillary sinusitis with mucopurulence filling the left maxillary sinusitis, chronic left frontal sinusitis with mucosal edema and mild polyposis, chronic left ethmoid sinusitis with mucosal edema and mild polyposis. Cultures taken from left maxillary sinus. Left-sided nasal septal deviation. Polyposis of right superior turbinate was biopsied. Regular propel placed in the left ethmoid cavity, propel mini placed in the left frontal sinus, chitin packing placed in the left ethmoid cavity. Detailed Description of Procedure: The patient was identified in the preoperative holding area. Informed consent was obtained. The patient was taken back to the operating suite laid supine on the operating table.   Anesthesia was induced and the patient was intubated without complication. The patient was prepped and draped in usual sterile fashion. Preoperative timeout was performed. Pledget soaked in a mixture of thrombin and topical epinephrine 1-2000 we will place the nasal passages and were used intermittently throughout the case in order to aid in hemostasis. The 0 degree endoscope was inserted and the nasal passages were examined. There is bilateral inferior turban hypertrophy, left-sided nasal septal deviation, edema of the left ostiomeatal complex, and polyposis of the right superior turbinate. The right superior turbinate polyposis was biopsied with trach likely forceps and sent as a specimen. Each inferior turbinate was outfractured downward and laterally using a [place] elevator. The left middle turbinate was medialized using a Cass. Next a ball-tipped probe was used to outfracture the uncinate and this was taken down using up-biting [place] and the microdebrider. The ball-tipped probe was used to enter the left maxillary sinus and there was expression of abundant purulence and this was cultured. The straight Daniel-Cut's were then used to open the maxillary sinus posteriorly and the backbiter was used to open the maxillary sinus anteriorly. I then switched to the 30 degree endoscope and the curved microdebrider to further open the left maxillary sinus and also used a backbiter to further open the left maxillary sinus making sure to include the natural os. The left maxillary sinus was irrigated with saline several times and suctioned out. And then switch back to 0 degree endoscope and the straight microdebrider. A J curette was used to enter the ethmoid bulla on the left side and the ethmoid air cells were taken down from the middle turbinate medially to the lamina Propecia laterally to the base lamella posteriorly into the skull base superiorly. The agar nausea was taken down using the J curette and microdebrider.   Next the base lamella was entered inferiorly and medially and the posterior ethmoid air cells were opened up using the J curette and microdebrider from the lamina for pressure laterally to the superior turbinate medially into the face of the sphenoid posteriorly. Next the balloon dilation system was inserted and the limited guidewire was advanced into the left frontal sinus which is confirmed via illumination of the forehead on the left. The balloon was then advanced and inflated to 12 sherice and lie down. Regular propel placed in the left ethmoid cavity, propel mini placed in the left frontal sinus, chitin packing placed in the left ethmoid cavity. There is irrigated with saline and suctioned out. There was excellent hemostasis. The patient was turned back to anesthesia awoken and taken the PACU in stable condition.     Electronically signed by Marvin Schulz MD on 9/23/2022 at 1:24 PM

## 2022-09-23 NOTE — ANESTHESIA POSTPROCEDURE EVALUATION
Department of Anesthesiology  Postprocedure Note    Patient: Aurelia Blake  MRN: [id number]  YOB: 1940  Date of evaluation: 9/23/2022      Procedure Summary     Date: 09/23/22 Room / Location: [facility] MAIN OR 02 / [facility] MAIN OR    Anesthesia Start: 1212 Anesthesia Stop: 5245    Procedure: SINUS ENDOSCOPY FUNCTIONAL SURGERY BALLOON SINUPLASTY Left Maxillary Antrostomy with Tissue Removal, Left Ethmoidectomy, Left Frontal Balloon Sinuplasties (Left: Nose) Diagnosis:       Chronic pansinusitis      (Chronic pansinusitis [J32.4])    Surgeons: Sabina De Luna MD Responsible Provider: Lukas Leahy MD    Anesthesia Type: general ASA Status: 3          Anesthesia Type: No value filed.     Celeste Phase I: Celeste Score: 9    Celeste Phase II: Celeste Score: 10      Anesthesia Post Evaluation    Patient location during evaluation: PACU  Patient participation: complete - patient participated  Level of consciousness: awake and alert  Airway patency: patent  Nausea & Vomiting: no nausea and no vomiting  Complications: no  Cardiovascular status: hemodynamically stable  Respiratory status: acceptable, nonlabored ventilation and spontaneous ventilation  Hydration status: euvolemic  Comments: BP (!) 146/70   Pulse 61   Temp 97.7 °F (36.5 °C)   Resp 16   Ht 5' 10\" (1.778 m)   Wt 263 lb 3.2 oz (119.4 kg)   SpO2 92%   BMI 37.77 kg/m²     Multimodal analgesia pain management approach

## 2022-09-23 NOTE — PERIOP NOTE
Pt bs 224, dr. Sydnie Rojas notified and order received for 4 units of humalog. 1142: BS rechecked, 220 now. Dr. Sydnie Rojas notified and no order received.

## 2022-09-23 NOTE — H&P
[facility] Op H&P  Note     Patient: Mart Spatz  MRN: [id number]  : 1940  Gender:  male  Vital Signs: Resp 17   Ht 5' 11\" (1.803 m)   Wt 259 lb (117.5 kg)   BMI 36.12 kg/m²   Date: 2022     CC:        Chief Complaint   Patient presents with    Follow-up       Patient presents today with c/o facial soreness and post nasal drip . Patient states that the PND does not have foul taste/odor as it previously did. HPI:  Mart Spatz is a [de-identified] y.o. male with a history of chronic rhinosinusitis since COVID in January. He has been on numerous rounds of antibiotics. He also recently had 2 teeth extracted. Since the most recent course of antibiotics he notes improvement in the malodorous smell and drainage. He does endorse bilateral nasal obstruction. He has TORSTEN and uses CPAP. His biggest complaint is postnasal drainage but he also has some facial pain and pressure. Past Medical History, Past Surgical History, Family history, Social History, and Medications were all reviewed with the patient today and updated as necessary.       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)    Lumbar stenosis    Hypertension    Bradycardia    Bilateral edema of lower extremity    Controlled type 2 diabetes mellitus with complication, without long-term current use of insulin ([facility])    PVC (premature ventricular contraction)    Chronic midline low back pain without sciatica    Status post laminectomy    Bacteremia due to Pseudomonas    History of pulmonary embolism    Chronic fatigue    HERNANDEZ (dyspnea on exertion)    Hematuria    Frequent falls    Spinal stenosis of lumbar region with radiculopathy    Weakness of lower extremity    Ventricular enlargement, right    Nonrheumatic mitral valve regurgitation    Enlarged prostate    Hx of decompressive lumbar laminectomy    Gastroesophageal reflux disease    Back pain    Restrictive lung disease    Urinary incontinence    Cervical spinal stenosis    Fall at home    Hypogonadism male    Atrial enlargement, right    Obesity    Background diabetic retinopathy ([address]    Unsteady gait    At risk for falls    Intermittent diarrhea    Obstructive sleep apnea (adult) (pediatric)    Personal history of thromboembolic disease    Lower extremity edema    Benign prostatic hyperplasia    Anticoagulated on Coumadin    Fracture of vertebra due to osteoporosis with routine healing    Arthritis    History of sepsis    Difficulty in walking    Obstructive sleep apnea    Loss of balance    Ureteral calculus    Mood disorder ([facility])    Closed compression fracture of body of L1 vertebra ([facility])    Hospice care patient    Abnormal chest CT    Chronic anticoagulation    Incontinence of feces    CKD (chronic kidney disease)    Neuropathy    Primary osteoarthritis of right knee    Weakness generalized           Current Outpatient Medications   Medication Sig    ipratropium (ATROVENT) 0.03 % nasal spray 2 sprays by Each Nostril route 3 times daily as needed for Rhinitis    traMADol (ULTRAM) 50 MG tablet Take 1 tablet by mouth daily as needed for Pain for up to 30 days.     ACCU-CHEK LEELA PLUS strip Check BS once per day, Indication E11.9    Cholecalciferol 50 MCG (2000 UT) TABS Take by mouth    famotidine (PEPCID) 20 MG tablet Take 20 mg by mouth 2 times daily    furosemide (LASIX) 20 MG tablet Take 20 mg by mouth daily    metFORMIN (GLUCOPHAGE-XR) 500 MG extended release tablet Take 2 tablet in the morning and 2 in the evening    metoprolol tartrate (LOPRESSOR) 25 MG tablet Take 25 mg by mouth 2 times daily    potassium chloride (KLOR-CON M) 10 MEQ extended release tablet Take 10 mEq by mouth 2 times daily    pravastatin (PRAVACHOL) 80 MG tablet Take 80 mg by mouth    tamsulosin (FLOMAX) 0.4 MG capsule Take 0.4 mg by mouth daily    triamcinolone (NASACORT) 55 MCG/ACT nasal inhaler 1 spray in each nostril twice a day as needed    warfarin (COUMADIN) 5 MG tablet Take Coumadin 7.5 mg once a day on Mondays ,Wednesdays and Fridays and 5 mg once a day on other days      No current facility-administered medications for this visit. Past Medical History        Past Medical History:   Diagnosis Date    Arrhythmia      Arthritis 6/23/2015    BDR (background diabetic retinopathy) ([facility] [address] 6/23/2015    Depression 6/23/2015    Diabetes mellitus type 2, controlled ([facility] [address]      Diabetic retinopathy ([facility] [address] 6/23/2015    Heartburn 6/23/2015    Hyperlipidemia 6/23/2015    Right-sided heart failure ([facility] [address]      Sleep apnea      Spinal stenosis      Spinal stenosis of lumbar region with radiculopathy 10/17/2017    Status post laminectomy 10/17/2017     status post L3-4, L4-5 decompressive laminectomy with excision of disc on 04/06/2016       Type II or unspecified type diabetes mellitus without mention of complication, not stated as uncontrolled           Social History           Tobacco Use    Smoking status: Never Smoker    Smokeless tobacco: Never Used   Substance Use Topics    Alcohol use: No      Past Surgical History         Past Surgical History:   Procedure Laterality Date    CATARACT REMOVAL Left      COLONOSCOPY   10/10/2017     Moderate diverticulosis other side normal fiding.  no more rpt    CYST REMOVAL        CYST REMOVAL        HEENT Right 2014     Cell removal; cheek    ORTHOPEDIC SURGERY   04/05/2016     back surgery Decompressive Laminectomy Lumbar         Family History         Family History   Problem Relation Age of Onset    Heart Disease Maternal Grandfather      Heart Disease Paternal Grandmother      Stroke Paternal Grandmother      Stroke Paternal Grandfather      Dementia Maternal Grandmother      Cancer Father           Prostate    Diabetes Father      Hypertension Father      Cancer Mother           Prostate    Breast Cancer Mother      Osteoarthritis Mother      COPD Mother      Thyroid Disease Mother      Heart Disease Mother      Alzheimer's Disease Maternal Grandmother              ROS:     Review of Systems   Constitutional: Negative for fever. HENT: Positive for postnasal drip. Negative for ear discharge and ear pain. Eyes: Negative for discharge. Respiratory: Negative for cough. Cardiovascular: Negative for chest pain. Gastrointestinal: Negative for abdominal pain. Endocrine: Negative for cold intolerance. Genitourinary: Negative for difficulty urinating. Musculoskeletal: Negative for neck pain. Skin: Negative for rash. Allergic/Immunologic: Negative for environmental allergies. Neurological: Negative for dizziness. Hematological: Negative for adenopathy. Psychiatric/Behavioral: Negative for agitation. PHYSICAL EXAM:     Resp 17   Ht 5' 11\" (1.803 m)   Wt 259 lb (117.5 kg)   BMI 36.12 kg/m²      General: NAD, well-appearing  Neuro: No gross neuro deficits. CN's II-XII intact. No facial weakness. Eyes: EOMI. Pupils reactive. No periorbital edema/ecchymosis. Skin: No facial erythema, rashes or concerning lesions. Nose: No external deviations or saddling. Intranasally, septum is deviated to the left without perforations, and septum abuts the uncinate, bilateral inferior turbinate hypertrophy. Mouth: Moist mucus membranes, normal tongue/palate mobility, no concerning mucosal lesions. Oropharynx: clear with no erythema/exudate, no tonsillar hypertrophy. Ears: Normal appearing auricles, no hematomas. EACs clear with no cerumen impaction, healthy canal skin, TM's intact with no perforations or retraction pockets. No middle ear effusions. Neck: Soft, supple, no palpable lateral neck masses. No parotid or submandibular masses. No thyromegaly or palpable thyroid nodules. No surgical scars. Lymphatics: No palpable cervical LAD. Resp/Lungs: No audible stridor or wheezing, CTAB  Heart: RRR  Extremities: No clubbing or cyanosis.      PROCEDURE: Nasal endoscopy  INDICATIONS: nasal obstruction  DESCRIPTION: After verbal consent was obtained and a timeout was performed, the 0 degree rigid nasal endoscope was advanced into both nares. The septum was deviated to the left w/ no perforations. Septum abuts the uncinate on the left. Bilateral inferior turbinate hypertrophy. There were no masses seen along the nasal floor or within the nasopharynx. On the R side, the mucosa was healthy and the turbinates were intact. The middle meatus showed a small mount of polyposis and edema. . On the L side, the mucosa was healthy and the turbinates were intact. The middle meatus showed no edema. The sphenoethmoidal recess was examined bilaterally and there was polyposis on the right side. The scope was then removed and the patient tolerated the procedure well w/ no complications. Lab Results   Component Value Date     WBC 5.7 05/09/2022     HGB 16.2 05/09/2022     HCT 48.7 05/09/2022     MCV 86 05/09/2022      (L) 05/09/2022            Lab Results   Component Value Date/Time      05/09/2022 09:02 AM     K 4.1 05/09/2022 09:02 AM      05/09/2022 09:02 AM     CO2 23 05/09/2022 09:02 AM     BUN 19 05/09/2022 09:02 AM     CREATININE 1.24 05/09/2022 09:02 AM     GLUCOSE 196 05/09/2022 09:02 AM     CALCIUM 9.5 05/09/2022 09:02 AM            ASSESSMENT and PLAN  [de-identified]year-old gentleman with chronic rhinosinusitis with nasal polyposis. I will get a CT scan of his sinuses to further work this up. I will give him Atrovent for his postnasal drainage. I also recommended nasal saline sprays. I will notify him of results of CT scan when it returns. ICD-10-CM     1. Allergic rhinitis, unspecified seasonality, unspecified trigger  J30.9     2. Hypertrophy of both inferior nasal turbinates  J34.3     3. Nasal obstruction  J34.89 NASAL ENDOSCOPY,DX   4. Nasal septal deviation  J34.2     5. Nasal polyps  J33.9 CT SINUS WO CONTRAST   6.  Chronic rhinosinusitis J31.0 CT SINUS WO CONTRAST     J32.9              Oscar Unger MD

## 2022-09-23 NOTE — ANESTHESIA PRE PROCEDURE
Department of Anesthesiology  Preprocedure Note       Name:  Yoselyn Jasso   Age:  [de-identified] y.o.  :  1940                                          MRN:  [id number]         Date:  2022      Surgeon: Lorena Ricks):  Christina Sue MD    Procedure: Procedure(s):  SINUS ENDOSCOPY FUNCTIONAL SURGERY BALLOON SINUPLASTY Left Maxillary Antrostomy with Tissue Removal, Left Ethmoidectomy, Left Frontal Balloon Sinuplasties    Medications prior to admission:   Prior to Admission medications    Medication Sig Start Date End Date Taking? Authorizing Provider   oxyCODONE-acetaminophen (PERCOCET) 5-325 MG per tablet Take 1 tablet by mouth every 6 hours as needed for Pain for up to 6 days. Intended supply: 5 days. Take lowest dose possible to manage pain 22  Christina Sue MD   ondansetron (ZOFRAN ODT) 4 MG disintegrating tablet Take 1 tablet by mouth every 8 hours as needed for Nausea or Vomiting 22   Christina Sue MD   amoxicillin-clavulanate (AUGMENTIN) 875-125 MG per tablet Take 1 tablet by mouth 2 times daily for 7 days 22  Christina Sue MD   traMADol (ULTRAM) 50 MG tablet Take 1 tablet by mouth daily as needed for Pain for up to 30 days. 9/15/22 10/15/22  Norberto Martinez MD   famotidine (PEPCID) 20 MG tablet Take 1 tablet by mouth in the morning and 1 tablet before bedtime.  22   Norberto Martinez MD   ipratropium (ATROVENT) 0.03 % nasal spray 2 sprays by Each Nostril route 3 times daily as needed for Rhinitis 22   Christina Sue MD   ACCU-CHEK LEELA PLUS strip Check BS once per day, Indication E11.9 22   Norberto Martinez MD   Cholecalciferol 50 MCG ( UT) TABS Take by mouth    Ar Automatic Reconciliation   furosemide (LASIX) 20 MG tablet Take 20 mg by mouth daily 10/4/21   Ar Automatic Reconciliation   metFORMIN (GLUCOPHAGE-XR) 500 MG extended release tablet Take 2 tablet in the morning and 2 in the evening 22 Ar Automatic Reconciliation   metoprolol tartrate (LOPRESSOR) 25 MG tablet Take 25 mg by mouth 2 times daily    Ar Automatic Reconciliation   potassium chloride (KLOR-CON M) 10 MEQ extended release tablet Take 10 mEq by mouth 2 times daily 10/4/21   Ar Automatic Reconciliation   pravastatin (PRAVACHOL) 80 MG tablet Take 80 mg by mouth at bedtime 1/18/22   Ar Automatic Reconciliation   tamsulosin (FLOMAX) 0.4 MG capsule Take 0.4 mg by mouth at bedtime 9/28/21   Ar Automatic Reconciliation   triamcinolone (NASACORT) 55 MCG/ACT nasal inhaler 1 spray in each nostril twice a day as needed 1/25/22   Ar Automatic Reconciliation   warfarin (COUMADIN) 5 MG tablet Take Coumadin 7.5 mg once a day on Mondays ,Wednesdays and Fridays and 5 mg once a day on other days 4/11/22   Ar Automatic Reconciliation       Current medications:    Current Facility-Administered Medications   Medication Dose Route Frequency Provider Last Rate Last Admin    lidocaine 1 % injection 1 mL  1 mL IntraDERmal Once PRN Ana Paiz MD        acetaminophen (TYLENOL) tablet 1,000 mg  1,000 mg Oral Once Ana Paiz MD        lactated ringers infusion   IntraVENous Continuous Ana Paiz  mL/hr at 09/23/22 1054 New Bag at 09/23/22 1054    sodium chloride flush 0.9 % injection 5-40 mL  5-40 mL IntraVENous 2 times per day Ana Paiz MD        sodium chloride flush 0.9 % injection 5-40 mL  5-40 mL IntraVENous PRN Ana Paiz MD        0.9 % sodium chloride infusion   IntraVENous PRN Ana Paiz MD        midazolam PF (VERSED) injection 2 mg  2 mg IntraVENous Once PRN Ana Paiz MD        insulin lispro (HUMALOG) injection vial 4 Units  4 Units SubCUTAneous Once Ana Paiz MD           Allergies:  No Known Allergies    Problem List:    Patient Active Problem List   Diagnosis Code    Abnormal gait R26.9    CIDP (chronic inflammatory demyelinating polyneuropathy) ([facility] G61.81    Lumbar stenosis M48.061    Hypertension I10    Bradycardia R00.1    Bilateral edema of lower extremity R60.0    Controlled type 2 diabetes mellitus with complication, without long-term current use of insulin ([facility]) E11.8    PVC (premature ventricular contraction) I49.3    Chronic midline low back pain without sciatica M54.50, G89.29    Status post laminectomy Z98.890    Bacteremia due to Pseudomonas R78.81, B96.5    History of pulmonary embolism Z86.711    Chronic fatigue R53.82    HERNANDEZ (dyspnea on exertion) R06.00    Hematuria R31.9    Frequent falls R29.6    Spinal stenosis of lumbar region with radiculopathy M48.061, M54.16    Weakness of lower extremity R29.898    Ventricular enlargement, right I51.7    Nonrheumatic mitral valve regurgitation I34.0    Enlarged prostate N40.0    Hx of decompressive lumbar laminectomy Z98.890    Gastroesophageal reflux disease K21.9    Back pain M54.9    Restrictive lung disease J98.4    Urinary incontinence R32    Cervical spinal stenosis M48.02    Fall at home W19. Merissa Sesay, Y92.009    Hypogonadism male E29.1    Atrial enlargement, right I51.7    Obesity E66.9    Background diabetic retinopathy ([facility] [address] Q29.8752    Unsteady gait R26.81    At risk for falls Z91.81    Intermittent diarrhea R19.7    Obstructive sleep apnea (adult) (pediatric) G47.33    Personal history of thromboembolic disease E17.935    Lower extremity edema R60.0    Benign prostatic hyperplasia N40.0    Anticoagulated on Coumadin Z79.01    Fracture of vertebra due to osteoporosis with routine healing M80. 08XD    Arthritis M19.90    History of sepsis Z86.19    Difficulty in walking R26.2    Obstructive sleep apnea G47.33    Loss of balance R26.89    Ureteral calculus N20.1    Mood disorder ([facility]) F39    Closed compression fracture of body of L1 vertebra ([facility]) S32.010A    Hospice care patient Z51.5    Abnormal chest CT R93.89    Chronic anticoagulation Z79.01    Incontinence of feces R15.9    CKD (chronic kidney disease) N18.9    Neuropathy G62.9    Primary osteoarthritis of right knee M17.11    Weakness generalized R53.1    Chronic pansinusitis J32.4    Chronic maxillary sinusitis J32.0    Chronic ethmoidal sinusitis J32.2    Chronic frontal sinusitis J32.1       Past Medical History:        Diagnosis Date    Arrhythmia     Arthritis 6/23/2015    BDR (background diabetic retinopathy) ([facility] [address] 6/23/2015    Cancer ([facility] [address]     Depression 6/23/2015    Diabetes mellitus type 2, controlled ([facility] [address]     Diabetic retinopathy ([facility] [address] 6/23/2015    Frequent PVCs     takes beta blocker    Heartburn 6/23/2015    Hx of blood clots     pt takes Coumadin    Hyperlipidemia 6/23/2015    Hypertension     managed with medication    Right-sided heart failure ([facility] [address]     Sleep apnea     Spinal stenosis     Spinal stenosis of lumbar region with radiculopathy 10/17/2017    Status post laminectomy 10/17/2017    status post L3-4, L4-5 decompressive laminectomy with excision of disc on 04/06/2016       Type II or unspecified type diabetes mellitus without mention of complication, not stated as uncontrolled        Past Surgical History:        Procedure Laterality Date    CATARACT REMOVAL Left     COLONOSCOPY  10/10/2017    Moderate diverticulosis other side normal fiding. no more rpt    CYST REMOVAL      CYST REMOVAL      HEENT Right 2014    Cell removal; cheek    ORTHOPEDIC SURGERY  04/05/2016    back surgery Decompressive Laminectomy Lumbar       Social History:    Social History     Tobacco Use    Smoking status: Never    Smokeless tobacco: Never   Substance Use Topics    Alcohol use:  No                                Counseling given: Not Answered      Vital Signs (Current):   Vitals:    09/21/22 1045 09/23/22 1016   BP:  139/67   Pulse:  53   Resp:  16   Temp:  97.3 °F (36.3 °C)   TempSrc:  Temporal   SpO2:  95%   Weight: 260 lb (117.9 kg) 263 lb 3.2 oz (119.4 kg)   Height: 5' 10\" (1.778 m) 5' 10\" (1.778 m)                                              BP Readings from Last 3 Encounters:   09/23/22 139/67   06/13/22 120/60   04/04/22 118/72       NPO Status: Time of last liquid consumption: 2300                        Time of last solid consumption: 2300                        Date of last liquid consumption: 09/22/22                        Date of last solid food consumption: 09/22/22    BMI:   Wt Readings from Last 3 Encounters:   09/23/22 263 lb 3.2 oz (119.4 kg)   07/11/22 259 lb (117.5 kg)   04/04/22 259 lb (117.5 kg)     Body mass index is 37.77 kg/m². CBC:   Lab Results   Component Value Date/Time    WBC 5.7 05/09/2022 09:02 AM    RBC 5.64 05/09/2022 09:02 AM    HGB 16.2 05/09/2022 09:02 AM    HCT 48.7 05/09/2022 09:02 AM    MCV 86 05/09/2022 09:02 AM    RDW 14.2 05/09/2022 09:02 AM     05/09/2022 09:02 AM       CMP:   Lab Results   Component Value Date/Time     05/09/2022 09:02 AM    K 4.1 05/09/2022 09:02 AM     05/09/2022 09:02 AM    CO2 23 05/09/2022 09:02 AM    BUN 19 05/09/2022 09:02 AM    CREATININE 1.24 05/09/2022 09:02 AM    GFRAA 71 01/11/2022 08:14 AM    AGRATIO 2.2 05/09/2022 09:02 AM    GLUCOSE 196 05/09/2022 09:02 AM    PROT 6.3 05/09/2022 09:02 AM    CALCIUM 9.5 05/09/2022 09:02 AM    BILITOT 0.6 05/09/2022 09:02 AM    ALKPHOS 55 05/09/2022 09:02 AM    AST 26 05/09/2022 09:02 AM    ALT 37 05/09/2022 09:02 AM       POC Tests: No results for input(s): POCGLU, POCNA, POCK, POCCL, POCBUN, POCHEMO, POCHCT in the last 72 hours.     Coags:   Lab Results   Component Value Date/Time    PROTIME 20.4 09/08/2020 02:40 PM    INR 2.4 10/19/2020 02:51 PM       HCG (If Applicable): No results found for: PREGTESTUR, PREGSERUM, HCG, HCGQUANT     ABGs: No results found for: PHART, PO2ART, GQS6NFH, CPY9HTX, BEART, U2ZFBFTB     Type & Screen (If Applicable):  No results found for: LABABO, LABRH    Drug/Infectious Status (If Applicable):  No results found for: HIV, HEPCAB    COVID-19 Screening (If Applicable): No results found for: COVID19        Anesthesia Evaluation  Patient summary reviewed and Nursing notes reviewed no history of anesthetic complications:   Airway: Mallampati: II  TM distance: >3 FB   Neck ROM: full  Mouth opening: > = 3 FB   Dental:          Pulmonary:normal exam    (+) shortness of breath:  sleep apnea: on CPAP,                             Cardiovascular:  Exercise tolerance: no interval change, Limited exercise  (+) hypertension:, dysrhythmias: PVC, hyperlipidemia        Rhythm: regular  Rate: normal           Beta Blocker:  Dose within 24 Hrs      ROS comment: SPECT from 7/2021:  · LV Function: EF is 45%. LV function is mildly reduced. There is global   hypokinesis. · Defect 1: Moderate defect of mild severity present in the basal   inferior, basal inferolateral, mid inferior and apical inferior location. The defect is partially reversible. · Left ventricular perfusion: abnormal.   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 Neuro/Psych:   (+) neuromuscular disease (CIDP):, psychiatric history:depression/anxiety             GI/Hepatic/Renal:   (+) GERD: well controlled,           Endo/Other:    (+) DiabetesType II DM, , .                 Abdominal:             Vascular:   + DVT, PE.        ROS comment: PE 2018. Other Findings:           Anesthesia Plan      general     ASA 3       Induction: intravenous. MIPS: Postoperative opioids intended and Prophylactic antiemetics administered. Anesthetic plan and risks discussed with patient and child/children. Use of blood products discussed with patient whom consented to blood products.                      Elizabeth Paul MD   9/23/2022

## 2022-09-26 LAB
BACTERIA SPEC CULT: NORMAL
GRAM STN SPEC: NORMAL
GRAM STN SPEC: NORMAL
SERVICE CMNT-IMP: NORMAL

## 2022-10-03 ENCOUNTER — TELEPHONE (OUTPATIENT)
Dept: INTERNAL MEDICINE CLINIC | Facility: CLINIC | Age: 82
End: 2022-10-03

## 2022-10-03 NOTE — TELEPHONE ENCOUNTER
Long Jordan from [facility] INR, [phone], calling in to report an out of range result on this patient.      INR 1.2 on 9/30

## 2022-10-06 ENCOUNTER — PATIENT MESSAGE (OUTPATIENT)
Dept: INTERNAL MEDICINE CLINIC | Facility: CLINIC | Age: 82
End: 2022-10-06

## 2022-10-06 ENCOUNTER — OFFICE VISIT (OUTPATIENT)
Dept: ENT CLINIC | Age: 82
End: 2022-10-06
Payer: MEDICARE

## 2022-10-06 VITALS
HEIGHT: 70 IN | DIASTOLIC BLOOD PRESSURE: 90 MMHG | BODY MASS INDEX: 37.22 KG/M2 | WEIGHT: 260 LBS | SYSTOLIC BLOOD PRESSURE: 150 MMHG

## 2022-10-06 DIAGNOSIS — I10 HYPERTENSION, UNSPECIFIED TYPE: Primary | ICD-10-CM

## 2022-10-06 DIAGNOSIS — Z98.890 H/O ENDOSCOPIC SINUS SURGERY: ICD-10-CM

## 2022-10-06 DIAGNOSIS — J32.4 CHRONIC PANSINUSITIS: ICD-10-CM

## 2022-10-06 DIAGNOSIS — J34.89 NASAL OBSTRUCTION: Primary | ICD-10-CM

## 2022-10-06 DIAGNOSIS — J30.9 ALLERGIC RHINITIS, UNSPECIFIED SEASONALITY, UNSPECIFIED TRIGGER: ICD-10-CM

## 2022-10-06 PROCEDURE — 31237 NSL/SINS NDSC SURG BX POLYPC: CPT | Performed by: STUDENT IN AN ORGANIZED HEALTH CARE EDUCATION/TRAINING PROGRAM

## 2022-10-06 PROCEDURE — G8417 CALC BMI ABV UP PARAM F/U: HCPCS | Performed by: STUDENT IN AN ORGANIZED HEALTH CARE EDUCATION/TRAINING PROGRAM

## 2022-10-06 PROCEDURE — G8484 FLU IMMUNIZE NO ADMIN: HCPCS | Performed by: STUDENT IN AN ORGANIZED HEALTH CARE EDUCATION/TRAINING PROGRAM

## 2022-10-06 PROCEDURE — 99213 OFFICE O/P EST LOW 20 MIN: CPT | Performed by: STUDENT IN AN ORGANIZED HEALTH CARE EDUCATION/TRAINING PROGRAM

## 2022-10-06 PROCEDURE — 1036F TOBACCO NON-USER: CPT | Performed by: STUDENT IN AN ORGANIZED HEALTH CARE EDUCATION/TRAINING PROGRAM

## 2022-10-06 PROCEDURE — G9692 HOSP RECD BY PT DUR MSMT PER: HCPCS | Performed by: STUDENT IN AN ORGANIZED HEALTH CARE EDUCATION/TRAINING PROGRAM

## 2022-10-06 PROCEDURE — G8427 DOCREV CUR MEDS BY ELIG CLIN: HCPCS | Performed by: STUDENT IN AN ORGANIZED HEALTH CARE EDUCATION/TRAINING PROGRAM

## 2022-10-06 RX ORDER — POTASSIUM CHLORIDE 750 MG/1
10 TABLET, EXTENDED RELEASE ORAL 2 TIMES DAILY
Qty: 180 TABLET | Refills: 3 | Status: SHIPPED | OUTPATIENT
Start: 2022-10-06 | End: 2023-01-04

## 2022-10-06 ASSESSMENT — ENCOUNTER SYMPTOMS
COUGH: 0
ABDOMINAL PAIN: 0
EYE DISCHARGE: 0

## 2022-10-06 NOTE — TELEPHONE ENCOUNTER
From: Kristel Love  To: Dr. Rodney Hua: 10/6/2022 7:40 AM EDT  Subject: Out of Refills    Good morning   Im out of refills for Potassium & metoprolol   Our appt is 10/11/22    We use [facility] on [address] [facility]  Thank you

## 2022-10-06 NOTE — PROGRESS NOTES
[facility] ENT Office Note    Patient: Isak Frias  MRN: [id number]  : 1940  Gender:  male  Vital Signs: BP (!) 150/90 (Site: Right Upper Arm, Position: Sitting)   Ht 5' 10\" (1.778 m)   Wt 260 lb (117.9 kg)   BMI 37.31 kg/m²   Date: 10/6/2022    CC:   Chief Complaint   Patient presents with    Post-Op Check     Patient here for post-op for his sinus's. HPI:  Isak Frias is a [de-identified] y.o. male status post right maxillary antrostomy tissue removal, left total ethmoidectomy, and frontal balloon sinuplasty on 2022. He is doing well. He has been using his rinses. Past Medical History, Past Surgical History, Family history, Social History, and Medications were all reviewed with the patient today and updated as necessary.      No Known Allergies  Patient Active Problem List   Diagnosis    Abnormal gait    CIDP (chronic inflammatory demyelinating polyneuropathy) ([facility])    Lumbar stenosis    Hypertension    Bradycardia    Bilateral edema of lower extremity    Controlled type 2 diabetes mellitus with complication, without long-term current use of insulin ([facility])    PVC (premature ventricular contraction)    Chronic midline low back pain without sciatica    Status post laminectomy    Bacteremia due to Pseudomonas    History of pulmonary embolism    Chronic fatigue    HERNANDEZ (dyspnea on exertion)    Hematuria    Frequent falls    Spinal stenosis of lumbar region with radiculopathy    Weakness of lower extremity    Ventricular enlargement, right    Nonrheumatic mitral valve regurgitation    Enlarged prostate    Hx of decompressive lumbar laminectomy    Gastroesophageal reflux disease    Back pain    Restrictive lung disease    Urinary incontinence    Cervical spinal stenosis    Fall at home    Hypogonadism male    Atrial enlargement, right    Obesity    Background diabetic retinopathy ([address]    Unsteady gait    At risk for falls    Intermittent diarrhea    Obstructive sleep apnea (adult) (pediatric)    Personal history of thromboembolic disease    Lower extremity edema    Benign prostatic hyperplasia    Anticoagulated on Coumadin    Fracture of vertebra due to osteoporosis with routine healing    Arthritis    History of sepsis    Difficulty in walking    Obstructive sleep apnea    Loss of balance    Ureteral calculus    Mood disorder (HCC)    Closed compression fracture of body of L1 vertebra ([facility])    Hospice care patient    Abnormal chest CT    Chronic anticoagulation    Incontinence of feces    CKD (chronic kidney disease)    Neuropathy    Primary osteoarthritis of right knee    Weakness generalized    Chronic pansinusitis    Chronic maxillary sinusitis    Chronic ethmoidal sinusitis    Chronic frontal sinusitis     Current Outpatient Medications   Medication Sig    potassium chloride (KLOR-CON M) 10 MEQ extended release tablet Take 1 tablet by mouth 2 times daily    metoprolol tartrate (LOPRESSOR) 25 MG tablet Take 1 tablet by mouth 2 times daily    ondansetron (ZOFRAN ODT) 4 MG disintegrating tablet Take 1 tablet by mouth every 8 hours as needed for Nausea or Vomiting    traMADol (ULTRAM) 50 MG tablet Take 1 tablet by mouth daily as needed for Pain for up to 30 days. famotidine (PEPCID) 20 MG tablet Take 1 tablet by mouth in the morning and 1 tablet before bedtime.     ipratropium (ATROVENT) 0.03 % nasal spray 2 sprays by Each Nostril route 3 times daily as needed for Rhinitis    ACCU-CHEK LEELA PLUS strip Check BS once per day, Indication E11.9    Cholecalciferol 50 MCG (2000 UT) TABS Take by mouth    furosemide (LASIX) 20 MG tablet Take 20 mg by mouth daily    metFORMIN (GLUCOPHAGE-XR) 500 MG extended release tablet Take 2 tablet in the morning and 2 in the evening    pravastatin (PRAVACHOL) 80 MG tablet Take 80 mg by mouth at bedtime    tamsulosin (FLOMAX) 0.4 MG capsule Take 0.4 mg by mouth at bedtime    triamcinolone (NASACORT) 55 MCG/ACT nasal inhaler 1 spray in each nostril twice a day as needed    warfarin (COUMADIN) 5 MG tablet Take Coumadin 7.5 mg once a day on Mondays ,Wednesdays and Fridays and 5 mg once a day on other days     No current facility-administered medications for this visit. Past Medical History:   Diagnosis Date    Arrhythmia     Arthritis 6/23/2015    B[facility] 6/23/2015    Cancer ([facility]     Depression 6/23/2015    Diabetes mellitus type 2, controlled ([facility]     Diabetic retinopathy ([facility] 6/23/2015    Frequent PVCs     takes beta blocker    Heartburn 6/23/2015    Hx of blood clots     pt takes Coumadin    Hyperlipidemia 6/23/2015    Hypertension     managed with medication    Right-sided heart failure ([facility]     Sleep apnea     Spinal stenosis     Spinal stenosis of lumbar region with radiculopathy 10/17/2017    Status post laminectomy 10/17/2017    status post L3-4, L4-5 decompressive laminectomy with excision of disc on 04/06/2016       Type II or unspecified type diabetes mellitus without mention of complication, not stated as uncontrolled      Social History     Tobacco Use    Smoking status: Never    Smokeless tobacco: Never   Substance Use Topics    Alcohol use: No     Past Surgical History:   Procedure Laterality Date    CATARACT REMOVAL Left     COLONOSCOPY  10/10/2017    Moderate diverticulosis other side normal fiding.  no more rpt    CYST REMOVAL      CYST REMOVAL      HEENT Right 2014    Cell removal; cheek    ORTHOPEDIC SURGERY  04/05/2016    back surgery Decompressive Laminectomy Lumbar    SINUS ENDOSCOPY Left 9/23/2022    SINUS ENDOSCOPY FUNCTIONAL SURGERY BALLOON SINUPLASTY Left Maxillary Antrostomy with Tissue Removal, Left Ethmoidectomy, Left Frontal Balloon Sinuplasties performed by Iglesia Morejon MD at [facility]     Family History   Problem Relation Age of Onset    Heart Disease Maternal Grandfather     Heart Disease Paternal Grandmother     Stroke Paternal Grandmother     Stroke Paternal Grandfather     Dementia Maternal Grandmother     Cancer Father         Prostate    Diabetes Father     Hypertension Father     Cancer Mother         Prostate    Breast Cancer Mother     Osteoarthritis Mother     COPD Mother     Thyroid Disease Mother     Heart Disease Mother     Alzheimer's Disease Maternal Grandmother         ROS:    Review of Systems   Constitutional:  Negative for fever. HENT:  Negative for ear discharge and ear pain. Eyes:  Negative for discharge. Respiratory:  Negative for cough. Cardiovascular:  Negative for chest pain. Gastrointestinal:  Negative for abdominal pain. Genitourinary:  Negative for difficulty urinating. Musculoskeletal:  Negative for neck pain. Skin:  Negative for rash. Allergic/Immunologic: Negative for environmental allergies. Neurological:  Negative for dizziness. Hematological:  Negative for adenopathy. Psychiatric/Behavioral:  Negative for agitation. PHYSICAL EXAM:    BP (!) 150/90 (Site: Right Upper Arm, Position: Sitting)   Ht 5' 10\" (1.778 m)   Wt 260 lb (117.9 kg)   BMI 37.31 kg/m²     General: NAD, well-appearing  Neuro: No gross neuro deficits. CN's II-XII intact. No facial weakness. Eyes: EOMI. Pupils reactive. No periorbital edema/ecchymosis. Skin: No facial erythema, rashes or concerning lesions. Nose: No external deviations or saddling. Intranasally, septum is midline without perforations, nasal mucosa appears healthy with no erythema, mucopurulence, or polyps. Mouth: Moist mucus membranes, normal tongue/palate mobility, no concerning mucosal lesions. Oropharynx: clear with no erythema/exudate, no tonsillar hypertrophy. Ears: Normal appearing auricles, no hematomas. EACs clear with no cerumen impaction, healthy canal skin, TM's intact with no perforations or retraction pockets. No middle ear effusions. Neck: Soft, supple, no palpable lateral neck masses. No parotid or submandibular masses. No thyromegaly or palpable thyroid nodules.  No surgical scars. Lymphatics: No palpable cervical LAD. Resp/Lungs: No audible stridor or wheezing, CTAB  Heart: RRR  Extremities: No clubbing or cyanosis. PROCEDURE: Nasal endoscopy  INDICATIONS: nasal obstruction  DESCRIPTION: After verbal consent was obtained and a timeout was performed, the 0 degree rigid nasal endoscope was advanced into both nares. The septum was midline w/ no perforations. There were no masses seen along the nasal floor or within the nasopharynx. On the R side, the mucosa was healthy and the turbinates were intact. The middle meatus was clear w/ no edema, polyps or mucopurulence. On the L side, the mucosa was healthy and the turbinates were intact. Propel stent removed from the ethmoid cavity and frontal recess using alligator forceps. Dissolvable packing and mucus suctioned out from the ethmoid cavity, frontal recess, and maxillary sinus using suction. Sinuses healthy appearing after debridement. The sphenoethmoidal recess was examined bilaterally and was free of pus or polyposis. The scope was then removed and the patient tolerated the procedure well w/ no complications. ASSESSMENT and PLAN  [de-identified]year-old gentleman status post left maxillary antrostomy tissue removal, total ethmoidectomy, and frontal balloon sinuplasty on 9-. Debridement performed today and is sinuses are healing up appropriately. I will give him steroid irrigation from the, and pharmacy to use for a month. I will see him back in 3 months. ICD-10-CM    1. Nasal obstruction  J34.89 MT NASAL SCOPE,BX/RMV POLYP/DEBRID      2. Allergic rhinitis, unspecified seasonality, unspecified trigger  J30.9       3. Chronic pansinusitis  J32.4       4. H/O endoscopic sinus surgery  Z98.890 MT NASAL SCOPE,BX/RMV POLYP/DEBRID            Bijal Bourgeois, [address] [facility]  10/6/2022  Electronically signed    Note dictated using voice recognition software. Please excuse any typos.

## 2022-10-06 NOTE — TELEPHONE ENCOUNTER
Requested Prescriptions     Pending Prescriptions Disp Refills    potassium chloride (KLOR-CON M) 10 MEQ extended release tablet 180 tablet 3     Sig: Take 1 tablet by mouth 2 times daily    metoprolol tartrate (LOPRESSOR) 25 MG tablet 180 tablet 3     Sig: Take 1 tablet by mouth 2 times daily     Dose verified and to [facility]

## 2022-10-10 ENCOUNTER — TELEPHONE (OUTPATIENT)
Dept: INTERNAL MEDICINE CLINIC | Facility: CLINIC | Age: 82
End: 2022-10-10

## 2022-10-11 ENCOUNTER — TELEMEDICINE (OUTPATIENT)
Dept: INTERNAL MEDICINE CLINIC | Facility: CLINIC | Age: 82
End: 2022-10-11
Payer: MEDICARE

## 2022-10-11 DIAGNOSIS — Z98.890 STATUS POST LAMINECTOMY: ICD-10-CM

## 2022-10-11 DIAGNOSIS — Z79.891 LONG TERM (CURRENT) USE OF OPIATE ANALGESIC: ICD-10-CM

## 2022-10-11 DIAGNOSIS — R60.0 BILATERAL EDEMA OF LOWER EXTREMITY: ICD-10-CM

## 2022-10-11 DIAGNOSIS — I10 HYPERTENSION, UNSPECIFIED TYPE: ICD-10-CM

## 2022-10-11 DIAGNOSIS — Z86.718 PERSONAL HISTORY OF THROMBOEMBOLIC DISEASE: ICD-10-CM

## 2022-10-11 DIAGNOSIS — M54.50 MIDLINE LOW BACK PAIN WITHOUT SCIATICA, UNSPECIFIED CHRONICITY: Primary | ICD-10-CM

## 2022-10-11 DIAGNOSIS — M62.830 BACK MUSCLE SPASM: ICD-10-CM

## 2022-10-11 DIAGNOSIS — N40.0 ENLARGED PROSTATE: ICD-10-CM

## 2022-10-11 DIAGNOSIS — E11.65 UNCONTROLLED TYPE 2 DIABETES MELLITUS WITH HYPERGLYCEMIA (HCC): ICD-10-CM

## 2022-10-11 DIAGNOSIS — E78.5 HYPERLIPIDEMIA ASSOCIATED WITH TYPE 2 DIABETES MELLITUS (HCC): ICD-10-CM

## 2022-10-11 DIAGNOSIS — E11.69 HYPERLIPIDEMIA ASSOCIATED WITH TYPE 2 DIABETES MELLITUS (HCC): ICD-10-CM

## 2022-10-11 PROCEDURE — G8417 CALC BMI ABV UP PARAM F/U: HCPCS | Performed by: INTERNAL MEDICINE

## 2022-10-11 PROCEDURE — G8484 FLU IMMUNIZE NO ADMIN: HCPCS | Performed by: INTERNAL MEDICINE

## 2022-10-11 PROCEDURE — 1036F TOBACCO NON-USER: CPT | Performed by: INTERNAL MEDICINE

## 2022-10-11 PROCEDURE — G9687 HOSPICE ANYTIME MSMT PER: HCPCS | Performed by: INTERNAL MEDICINE

## 2022-10-11 PROCEDURE — G9692 HOSP RECD BY PT DUR MSMT PER: HCPCS | Performed by: INTERNAL MEDICINE

## 2022-10-11 PROCEDURE — 99215 OFFICE O/P EST HI 40 MIN: CPT | Performed by: INTERNAL MEDICINE

## 2022-10-11 PROCEDURE — G8427 DOCREV CUR MEDS BY ELIG CLIN: HCPCS | Performed by: INTERNAL MEDICINE

## 2022-10-11 RX ORDER — FUROSEMIDE 20 MG/1
20 TABLET ORAL DAILY
Qty: 90 TABLET | Refills: 3 | Status: SHIPPED | OUTPATIENT
Start: 2022-10-11

## 2022-10-11 RX ORDER — TIZANIDINE 2 MG/1
2 TABLET ORAL 3 TIMES DAILY PRN
Qty: 90 TABLET | Refills: 0 | Status: SHIPPED | OUTPATIENT
Start: 2022-10-11 | End: 2022-11-10

## 2022-10-11 RX ORDER — TRAMADOL HYDROCHLORIDE 50 MG/1
50 TABLET ORAL EVERY 12 HOURS PRN
Qty: 60 TABLET | Refills: 0 | Status: SHIPPED | OUTPATIENT
Start: 2022-10-11 | End: 2022-11-10

## 2022-10-11 RX ORDER — PRAVASTATIN SODIUM 80 MG/1
80 TABLET ORAL NIGHTLY
Qty: 90 TABLET | Refills: 3 | Status: SHIPPED | OUTPATIENT
Start: 2022-10-11

## 2022-10-11 RX ORDER — TAMSULOSIN HYDROCHLORIDE 0.4 MG/1
0.4 CAPSULE ORAL NIGHTLY
Qty: 90 CAPSULE | Refills: 3 | Status: SHIPPED | OUTPATIENT
Start: 2022-10-11

## 2022-10-11 ASSESSMENT — PATIENT HEALTH QUESTIONNAIRE - PHQ9
2. FEELING DOWN, DEPRESSED OR HOPELESS: 0
SUM OF ALL RESPONSES TO PHQ9 QUESTIONS 1 & 2: 0
SUM OF ALL RESPONSES TO PHQ QUESTIONS 1-9: 0
1. LITTLE INTEREST OR PLEASURE IN DOING THINGS: 0
SUM OF ALL RESPONSES TO PHQ QUESTIONS 1-9: 0

## 2022-10-11 NOTE — PROGRESS NOTES
10/11/2022    Jermaine Fisher (: 1940) is a [de-identified] y.o. male, Established patientpatient, here for evaluation of the following chief complaint(s):     Chief Complaint   Patient presents with    Diabetes     AM fasting improved 170's average     Follow-up     INR 2.2 this morning          Assessment & Plan:     1. Midline low back pain without sciatica, unspecified chronicity  Comments: Worsening back pain. Encouraged to do stretch exercises use local heat appointments along with medications. Orders:  -     traMADol (ULTRAM) 50 MG tablet; Take 1 tablet by mouth every 12 hours as needed for Pain for up to 30 days. , Disp-60 tablet, R-0Normal  -     tiZANidine (ZANAFLEX) 2 MG tablet; Take 1 tablet by mouth 3 times daily as needed (pain and spasm), Disp-90 tablet, R-0Normal  2. Status post laminectomy  3. Uncontrolled type 2 diabetes mellitus with hyperglycemia (HCC)  -     CBC with Auto Differential; Future  -     Comprehensive Metabolic Panel; Future  -     Hemoglobin A1C; Future  -     Lipid Panel; Future  4. Personal history of thromboembolic disease  Comments:  INR has improved. We will continue current regimen. 5. Long term (current) use of opiate analgesic  Comments:  Dose of tramadol increased today. To help with worsening pain. Patient is encouraged to use medication only as needed. 6. Hypertension, unspecified type  7. Bilateral edema of lower extremity  -     furosemide (LASIX) 20 MG tablet; Take 1 tablet by mouth daily, Disp-90 tablet, R-3Normal  8. Hyperlipidemia associated with type 2 diabetes mellitus (HCC)  -     pravastatin (PRAVACHOL) 80 MG tablet; Take 1 tablet by mouth at bedtime, Disp-90 tablet, R-3Normal  9. Back muscle spasm  Comments:  Start tizanidine. Patient and daughter made aware of side effects start at low-dose. .  Orders:  -     tiZANidine (ZANAFLEX) 2 MG tablet; Take 1 tablet by mouth 3 times daily as needed (pain and spasm), Disp-90 tablet, R-0Normal  10.  Enlarged prostate  -     tamsulosin (FLOMAX) 0.4 MG capsule; Take 1 capsule by mouth at bedtime, Disp-90 capsule, R-3Normal      I have reviewed the patients controlled substance prescription history, as maintained in the [place] prescription monitoring program, so that the prescription(s) for a  controlled substance can be given. Patient instructed on indications for use of new medications, proper administration, and potential side effects. On 10/11/2022  I  have spent 40 - 54  minutes reviewing previous notes, test results and face to face with the patient discussing the diagnosis and importance of compliance with the treatment plan as well as documenting on the day of the visit. Return in about 4 weeks (around 11/8/2022) for fasting labs . MD santana/harleen in office 1wk after labs - EOV. Subjective:     HPI  Diabetes mellitus  Patient is following up on diabetes mellitus. At last visit it was noted that her blood sugars are running high. Explanation given by patient and his daughter that he was suffering from air/sinus infection and his blood sugars are fluctuating at the time. Since then he has had ENT surgery:  9/23/2022 procedures: right maxillary antrostomy tissue removal, left total ethmoidectomy, and frontal balloon sinuplasty. He has had follow-up with ENT and is reportedly doing well. Steroid rinses have been recommended. (ENT records reviewed today)    Currently reports that his blood sugar has improved since he had surgery. Course fasting blood sugars range of 170 to 119 mg/dL. Both he and his daughter satisfied that this has improved from the 200s he was having prior. Current medication metformin 500 mg 2 tablets twice a day. 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 INRs have been running a bit low -daughter said that he had to hold his Coumadin for about 5 days for his procedure.   He has resumed Coumadin and reports that today's INR is 2.2. Back Pain   Patient says that he has worsening orsening of back pain . Spasms when he turns at night  . He is using tramadol. Using icy hot to his back. Sometimes takes Tylenol. Not engaging in any stretching or the back exercises. Daughter says that he cannot lay on his back to exercise or stretch because he has difficulty breathing. Patient requests an increase in tramadol. Medication refills  Asked for refills of tamsulosin pravastatin and furosemide. Review of Systems   Nil significant      Objective:     Mart Spatz is being evaluated by a Virtual Visit (video visit) encounter to address concerns as mentioned above. A caregiver was present when appropriate. Due to this being a TeleHealth encounter (During [facility] public health emergency), evaluation of the following organ systems was limited: Vitals/Constitutional/EENT/Resp/CV/GI//MS/Neuro/Skin/Heme-Lymph-Imm. No flowsheet data found. Physical Exam  General appearance:Well looking , No distress Alert and oriented X 3. Well nourished and well hydrated  Head: Normocephalic, atraumatic  Eyes: conjunctivae clear. Resp: normal effort  Neurology: no Facial asymmetry   Psychology: normal affect. Mood is normal    Jermaine Manuel  was evaluated through a synchronous (real-time) audio-video encounter. The patient (or guardian if applicable) is aware that this is a billable service, which includes applicable co-pays. This Virtual Visit was conducted with patient's (and/or legal guardian's) consent. The visit was conducted pursuant to the emergency declaration under the [address] [place] [address], [address] [place] [address] authority and the [facility] and [employer] General Act. Patient identification was verified, and a caregiver was present when appropriate.    The patient was located at Other: [place] .   Provider was located at Facility (Appt Dept): [address],  [address]        Sonu Leal MD FACP

## 2022-10-17 ENCOUNTER — TELEPHONE (OUTPATIENT)
Dept: INTERNAL MEDICINE CLINIC | Facility: CLINIC | Age: 82
End: 2022-10-17

## 2022-10-17 NOTE — TELEPHONE ENCOUNTER
----- Message from Renate Almanza MD sent at 10/16/2022  5:04 PM EDT -----  INR 10/11/22 - at goal . Continue current dose of Coumadin

## 2022-10-19 LAB
BACTERIA SPEC CULT: ABNORMAL
BACTERIA SPEC CULT: ABNORMAL
Lab: NORMAL
Lab: NORMAL
REFERENCE LAB: NORMAL
SERVICE CMNT-IMP: ABNORMAL

## 2022-10-25 LAB
FUNGAL CULT/SMEAR: NORMAL
FUNGUS (MYCOLOGY) CULTURE: NORMAL
FUNGUS SMEAR: NORMAL
REFLEX TO ID: NORMAL
SPECIMEN PROCESSING: NORMAL
SPECIMEN SOURCE: NORMAL
SPECIMEN SOURCE: NORMAL

## 2022-11-01 ENCOUNTER — TELEPHONE (OUTPATIENT)
Dept: INTERNAL MEDICINE CLINIC | Facility: CLINIC | Age: 82
End: 2022-11-01

## 2022-11-01 NOTE — TELEPHONE ENCOUNTER
----- Message from Bianka Choi MD sent at 11/1/2022 12:23 AM EDT -----  INR at goal. Continue therapy

## 2022-11-10 DIAGNOSIS — N40.0 ENLARGED PROSTATE: ICD-10-CM

## 2022-11-10 DIAGNOSIS — M54.50 MIDLINE LOW BACK PAIN WITHOUT SCIATICA, UNSPECIFIED CHRONICITY: ICD-10-CM

## 2022-11-10 RX ORDER — TAMSULOSIN HYDROCHLORIDE 0.4 MG/1
0.4 CAPSULE ORAL NIGHTLY
Qty: 90 CAPSULE | Refills: 3 | Status: CANCELLED | OUTPATIENT
Start: 2022-11-10

## 2022-11-11 RX ORDER — TRAMADOL HYDROCHLORIDE 50 MG/1
50 TABLET ORAL EVERY 12 HOURS PRN
Qty: 60 TABLET | Refills: 0 | Status: SHIPPED | OUTPATIENT
Start: 2022-11-11 | End: 2022-12-11

## 2022-11-11 NOTE — TELEPHONE ENCOUNTER
Requested Prescriptions     Pending Prescriptions Disp Refills    traMADol (ULTRAM) 50 MG tablet 60 tablet 0     Sig: Take 1 tablet by mouth every 12 hours as needed for Pain for up to 30 days. Dose verified and last filled on 10/12/2022. [facility] checked. To [facility]. Patient also requested tamsulosin, according to our records, patient should have enough refills at his pharmacy.

## 2022-11-17 DIAGNOSIS — E11.65 UNCONTROLLED TYPE 2 DIABETES MELLITUS WITH HYPERGLYCEMIA (HCC): ICD-10-CM

## 2022-11-17 LAB
ALBUMIN SERPL-MCNC: 3 G/DL (ref 3.2–4.6)
ALBUMIN/GLOB SERPL: 1 {RATIO} (ref 0.4–1.6)
ALP SERPL-CCNC: 55 U/L (ref 50–136)
ALT SERPL-CCNC: 44 U/L (ref 12–65)
ANION GAP SERPL CALC-SCNC: 6 MMOL/L (ref 2–11)
AST SERPL-CCNC: 22 U/L (ref 15–37)
BASOPHILS # BLD: 0 K/UL (ref 0–0.2)
BASOPHILS NFR BLD: 0 % (ref 0–2)
BILIRUB SERPL-MCNC: 0.8 MG/DL (ref 0.2–1.1)
BUN SERPL-MCNC: 21 MG/DL (ref 8–23)
CALCIUM SERPL-MCNC: 9.6 MG/DL (ref 8.3–10.4)
CHLORIDE SERPL-SCNC: 106 MMOL/L (ref 101–110)
CHOLEST SERPL-MCNC: 162 MG/DL
CO2 SERPL-SCNC: 31 MMOL/L (ref 21–32)
CREAT SERPL-MCNC: 1.1 MG/DL (ref 0.8–1.5)
DIFFERENTIAL METHOD BLD: ABNORMAL
EOSINOPHIL # BLD: 0.2 K/UL (ref 0–0.8)
EOSINOPHIL NFR BLD: 5 % (ref 0.5–7.8)
ERYTHROCYTE [DISTWIDTH] IN BLOOD BY AUTOMATED COUNT: 13.4 % (ref 11.9–14.6)
GLOBULIN SER CALC-MCNC: 3.1 G/DL (ref 2.8–4.5)
GLUCOSE SERPL-MCNC: 194 MG/DL (ref 65–100)
HCT VFR BLD AUTO: 49.5 % (ref 41.1–50.3)
HDLC SERPL-MCNC: 44 MG/DL (ref 40–60)
HDLC SERPL: 3.7 {RATIO}
HGB BLD-MCNC: 16 G/DL (ref 13.6–17.2)
IMM GRANULOCYTES # BLD AUTO: 0 K/UL (ref 0–0.5)
IMM GRANULOCYTES NFR BLD AUTO: 0 % (ref 0–5)
LDLC SERPL CALC-MCNC: 55.4 MG/DL
LYMPHOCYTES # BLD: 1.5 K/UL (ref 0.5–4.6)
LYMPHOCYTES NFR BLD: 29 % (ref 13–44)
MCH RBC QN AUTO: 29 PG (ref 26.1–32.9)
MCHC RBC AUTO-ENTMCNC: 32.3 G/DL (ref 31.4–35)
MCV RBC AUTO: 89.7 FL (ref 82–102)
MONOCYTES # BLD: 0.4 K/UL (ref 0.1–1.3)
MONOCYTES NFR BLD: 9 % (ref 4–12)
NEUTS SEG # BLD: 2.9 K/UL (ref 1.7–8.2)
NEUTS SEG NFR BLD: 57 % (ref 43–78)
NRBC # BLD: 0 K/UL (ref 0–0.2)
PLATELET # BLD AUTO: 118 K/UL (ref 150–450)
PMV BLD AUTO: 10.5 FL (ref 9.4–12.3)
POTASSIUM SERPL-SCNC: 3.9 MMOL/L (ref 3.5–5.1)
PROT SERPL-MCNC: 6.1 G/DL (ref 6.3–8.2)
RBC # BLD AUTO: 5.52 M/UL (ref 4.23–5.6)
SODIUM SERPL-SCNC: 143 MMOL/L (ref 133–143)
TRIGL SERPL-MCNC: 313 MG/DL (ref 35–150)
VLDLC SERPL CALC-MCNC: 62.6 MG/DL (ref 6–23)
WBC # BLD AUTO: 5.1 K/UL (ref 4.3–11.1)

## 2022-11-18 LAB
EST. AVERAGE GLUCOSE BLD GHB EST-MCNC: 200 MG/DL
HBA1C MFR BLD: 8.6 % (ref 4.8–5.6)

## 2022-11-21 ENCOUNTER — OFFICE VISIT (OUTPATIENT)
Dept: INTERNAL MEDICINE CLINIC | Facility: CLINIC | Age: 82
End: 2022-11-21
Payer: MEDICARE

## 2022-11-21 VITALS
DIASTOLIC BLOOD PRESSURE: 60 MMHG | HEIGHT: 70 IN | BODY MASS INDEX: 37.31 KG/M2 | SYSTOLIC BLOOD PRESSURE: 100 MMHG | RESPIRATION RATE: 16 BRPM | HEART RATE: 84 BPM | OXYGEN SATURATION: 95 %

## 2022-11-21 DIAGNOSIS — I10 HYPERTENSION, UNSPECIFIED TYPE: ICD-10-CM

## 2022-11-21 DIAGNOSIS — D69.6 THROMBOCYTOPENIA, UNSPECIFIED (HCC): ICD-10-CM

## 2022-11-21 DIAGNOSIS — Z98.890 STATUS POST LAMINECTOMY: ICD-10-CM

## 2022-11-21 DIAGNOSIS — E11.69 HYPERLIPIDEMIA ASSOCIATED WITH TYPE 2 DIABETES MELLITUS (HCC): ICD-10-CM

## 2022-11-21 DIAGNOSIS — G61.81 CIDP (CHRONIC INFLAMMATORY DEMYELINATING POLYNEUROPATHY) (HCC): ICD-10-CM

## 2022-11-21 DIAGNOSIS — E66.01 SEVERE OBESITY (BMI 35.0-39.9) WITH COMORBIDITY (HCC): ICD-10-CM

## 2022-11-21 DIAGNOSIS — M54.50 MIDLINE LOW BACK PAIN WITHOUT SCIATICA, UNSPECIFIED CHRONICITY: ICD-10-CM

## 2022-11-21 DIAGNOSIS — L72.9 SCALP CYST: ICD-10-CM

## 2022-11-21 DIAGNOSIS — E11.65 UNCONTROLLED TYPE 2 DIABETES MELLITUS WITH HYPERGLYCEMIA (HCC): Primary | ICD-10-CM

## 2022-11-21 DIAGNOSIS — E78.5 HYPERLIPIDEMIA ASSOCIATED WITH TYPE 2 DIABETES MELLITUS (HCC): ICD-10-CM

## 2022-11-21 PROCEDURE — G8427 DOCREV CUR MEDS BY ELIG CLIN: HCPCS | Performed by: INTERNAL MEDICINE

## 2022-11-21 PROCEDURE — G9687 HOSPICE ANYTIME MSMT PER: HCPCS | Performed by: INTERNAL MEDICINE

## 2022-11-21 PROCEDURE — G8417 CALC BMI ABV UP PARAM F/U: HCPCS | Performed by: INTERNAL MEDICINE

## 2022-11-21 PROCEDURE — 3078F DIAST BP <80 MM HG: CPT | Performed by: INTERNAL MEDICINE

## 2022-11-21 PROCEDURE — 1036F TOBACCO NON-USER: CPT | Performed by: INTERNAL MEDICINE

## 2022-11-21 PROCEDURE — 3074F SYST BP LT 130 MM HG: CPT | Performed by: INTERNAL MEDICINE

## 2022-11-21 PROCEDURE — 99214 OFFICE O/P EST MOD 30 MIN: CPT | Performed by: INTERNAL MEDICINE

## 2022-11-21 PROCEDURE — G8484 FLU IMMUNIZE NO ADMIN: HCPCS | Performed by: INTERNAL MEDICINE

## 2022-11-21 PROCEDURE — G9692 HOSP RECD BY PT DUR MSMT PER: HCPCS | Performed by: INTERNAL MEDICINE

## 2022-11-21 ASSESSMENT — PATIENT HEALTH QUESTIONNAIRE - PHQ9
SUM OF ALL RESPONSES TO PHQ QUESTIONS 1-9: 2
SUM OF ALL RESPONSES TO PHQ9 QUESTIONS 1 & 2: 2
SUM OF ALL RESPONSES TO PHQ QUESTIONS 1-9: 2
1. LITTLE INTEREST OR PLEASURE IN DOING THINGS: 0
2. FEELING DOWN, DEPRESSED OR HOPELESS: 2

## 2022-11-21 NOTE — PROGRESS NOTES
11/21/2022    Chief Complaint   Patient presents with    Follow-up     Labs      Cyst     Recurrent Size of quarter possibly getting bigger, denies pain        Assessment and plan     1. Uncontrolled type 2 diabetes mellitus with hyperglycemia ([address]  2. Midline low back pain without sciatica, unspecified chronicity  3. Hypertension, unspecified type  4. Hyperlipidemia associated with type 2 diabetes mellitus ([address]  5. Status post laminectomy  6. CIDP (chronic inflammatory demyelinating polyneuropathy) ([facility])  7. Scalp cyst  8. Severe obesity (BMI 35.0-39. 9) with comorbidity ([address]     Generally doing well. Seems to be at his baseline. There has been some improvement in his A1c. Current goal is to get it less than 8.0. He wants to continue metformin for now. Would like to try Imodium as needed. Reports that when he was in hospice , Imodium was recommended to control diarrhea - he wants to try this again,    We have agreed to try Imodium 2 mg in the morning with his metformin. He may take another dose of Imodium in the evening especially if he is going out. He will continue to his blood sugar. If no improvement or if unremarkable diarrhea discussed the possibility of switching from metformin to insulin for better control of his blood sugar. He had questions about whether or not he would develop any significant complications of diabetes if his A1c stayed in the range that it is currently in. He had a discussion about diet. Discussed pneumonia vaccination  He plans to get COVID booster in December. Return in about 4 months (around 3/21/2023) for Chronic visit follow up, Fasting labs one week prior. Subjective           Mr. Tisha Vasquez is accompanied by his daughter for his visit today. HPI:     Patient is here to follow up on Hypertension, Diabetes and Hypercholesterolemia.    No symptoms or signs suggestive of uncontrolled diabetes mellitus, coronary artery disease or cerebrovascular disease. Taking Medications as instructed -  to 200 mg/dl - Fasting . This is an improvement from previous. Current medication metformin. He tends to have diarrhea related to metformin. This interferes with his quality of life. He has a history of elevated triglycerides. Hemoglobin A1C   Date Value Ref Range Status   11/17/2022 8.6 (H) 4.8 - 5.6 % Final       Lab Results   Component Value Date    CHOL 162 11/17/2022    CHOL 171 05/09/2022    CHOL 194 01/11/2022     Lab Results   Component Value Date    TRIG 313 (H) 11/17/2022    TRIG 243 (H) 05/09/2022    TRIG 279 (H) 01/11/2022     Lab Results   Component Value Date    HDL 44 11/17/2022    HDL 44 05/09/2022    HDL 43 01/11/2022     Lab Results   Component Value Date    LDLCALC 55.4 11/17/2022    LDLCALC 86 05/09/2022    LDLCALC 103 (H) 01/11/2022     Lab Results   Component Value Date    LABVLDL 62.6 (H) 11/17/2022    LABVLDL 42 (H) 09/09/2019    VLDL 41 (H) 05/09/2022    VLDL 48 (H) 01/11/2022    VLDL 52 (H) 09/08/2021     Lab Results   Component Value Date    CHOLHDLRATIO 3.7 11/17/2022       Cyst at back of head   Presents for about 6 months . Getting larger . No pain , exudate. Daughter says that is  between straps of CPAP . He is interested in having it removed. Chronic back pain   Doing better with increased dose of tramadol.     Review of Systems  Nil significant    Lab Results   Component Value Date    WBC 5.1 11/17/2022    HGB 16.0 11/17/2022    HCT 49.5 11/17/2022    MCV 89.7 11/17/2022     (L) 11/17/2022     Lab Results   Component Value Date     11/17/2022    K 3.9 11/17/2022     11/17/2022    CO2 31 11/17/2022    BUN 21 11/17/2022    CREATININE 1.10 11/17/2022    GLUCOSE 194 (H) 11/17/2022    CALCIUM 9.6 11/17/2022    PROT 6.1 (L) 11/17/2022    LABALBU 3.0 (L) 11/17/2022    BILITOT 0.8 11/17/2022    ALKPHOS 55 11/17/2022    AST 22 11/17/2022    ALT 44 11/17/2022    LABGLOM >60 11/17/2022    GFRAA 71 01/11/2022 AGRATIO 2.2 05/09/2022    GLOB 3.1 11/17/2022            Objective     Examination  /60 (Site: Left Upper Arm, Position: Sitting)   Pulse 84   Resp 16   Ht 5' 10\" (1.778 m)   SpO2 95%   BMI 37.31 kg/m²     Physical Exam  General appearance:Well looking , No distress Alert and oriented X 3. Well nourished and well hydrated. Sitting in his wheelchair. Head: Normocephalic, atraumatic. Scalp : Soft cystic lesion in the occipital area in the midline. Has a bluish tint. Some areas feel firm. No increased temperature. No erythema. No surrounding induration. Not reducible. Eyes: conjunctivae clear. Neck: Supple, No carotid bruit, no thyromegaly, no adenopathy  Resp: normal effort. Chest clear  Heart: RRR. Normal heart sounds . Abdomen: Obese, soft, non-tender, no masses , no organomegaly. Normal bowel sounds  Extremities: No edema  Feet: Dorsalis pedis pulses felt bilaterally. Skin appears intact. Mild erythema [chronic]. Hammertoes. Neurology: no Focal deficits  Psychology: normal affect.  Mood is normal     MD Rabia Kebede

## 2022-11-23 NOTE — PROGRESS NOTES
Juanita Ruiz MD   Bariatric & Advanced Laparoscopic Surgery & Endoscopy  [address] [facility] [address], [address] [facility]  [facility]  Phone [phone]   Fax [phone]      Date of visit: 2022      Primary/Requesting provider: Alma Streeter MD         Name: Lorenzo Holloway      MRN: [id number]       : 1940       Age: [address] y.o. Sex: male        PCP: Alma Streeter MD     CC:    Chief Complaint   Patient presents with    New Patient     NP - Scalp cyst       HPI:     Lorenzo Holloway is a [address] y.o. male who presents for evaluation of scalp cyst that appeared several months ago. He reports having a similar one removed from same area in . He fell a couple months ago and this was noticed after that. He denies any pain. No bleeding. No drainage. He does take coumadin. PMH:    Past Medical History:   Diagnosis Date    Arrhythmia     Arthritis 2015    BDR (background diabetic retinopathy) ([address] 2015    Cancer ([address]     Depression 2015    Diabetes mellitus type 2, controlled ([address]     Diabetic retinopathy ([address] 2015    Frequent PVCs     takes beta blocker    Heartburn 2015    Hx of blood clots     pt takes Coumadin    Hyperlipidemia 2015    Hypertension     managed with medication    Right-sided heart failure ([address]     Sleep apnea     Spinal stenosis     Spinal stenosis of lumbar region with radiculopathy 10/17/2017    Status post laminectomy 10/17/2017    status post L3-4, L4-5 decompressive laminectomy with excision of disc on 2016       Type II or unspecified type diabetes mellitus without mention of complication, not stated as uncontrolled        PSH:    Past Surgical History:   Procedure Laterality Date    CATARACT REMOVAL Bilateral     COLONOSCOPY  10/10/2017    Moderate diverticulosis other side normal fiding.  no more rpt    CYST REMOVAL      CYST REMOVAL      HEENT Right     Cell removal; cheek MOHS Surgery    ORTHOPEDIC SURGERY  04/05/2016    back surgery Decompressive Laminectomy Lumbar    SINUS ENDOSCOPY Left 09/23/2022    SINUS ENDOSCOPY FUNCTIONAL SURGERY BALLOON SINUPLASTY Left Maxillary Antrostomy with Tissue Removal, Left Ethmoidectomy, Left Frontal Balloon Sinuplasties performed by Dinesh Perez MD at [facility]:    Current Outpatient Medications   Medication Sig Dispense Refill    traMADol (ULTRAM) 50 MG tablet Take 1 tablet by mouth every 12 hours as needed for Pain for up to 30 days. 60 tablet 0    pravastatin (PRAVACHOL) 80 MG tablet Take 1 tablet by mouth at bedtime 90 tablet 3    tamsulosin (FLOMAX) 0.4 MG capsule Take 1 capsule by mouth at bedtime 90 capsule 3    furosemide (LASIX) 20 MG tablet Take 1 tablet by mouth daily 90 tablet 3    potassium chloride (KLOR-CON M) 10 MEQ extended release tablet Take 1 tablet by mouth 2 times daily 180 tablet 3    metoprolol tartrate (LOPRESSOR) 25 MG tablet Take 1 tablet by mouth 2 times daily 180 tablet 3    ondansetron (ZOFRAN ODT) 4 MG disintegrating tablet Take 1 tablet by mouth every 8 hours as needed for Nausea or Vomiting 12 tablet 0    famotidine (PEPCID) 20 MG tablet Take 1 tablet by mouth in the morning and 1 tablet before bedtime.  180 tablet 3    ACCU-CHEK LEELA PLUS strip Check BS once per day, Indication E11.9 100 each 3    Cholecalciferol 50 MCG (2000 UT) TABS Take by mouth      metFORMIN (GLUCOPHAGE-XR) 500 MG extended release tablet Take 2 tablet in the morning and 2 in the evening      ipratropium (ATROVENT) 0.03 % nasal spray 2 sprays by Each Nostril route 3 times daily as needed for Rhinitis (Patient not taking: Reported on 11/29/2022) 2 each 5    triamcinolone (NASACORT) 55 MCG/ACT nasal inhaler 1 spray in each nostril twice a day as needed (Patient not taking: Reported on 11/29/2022)      warfarin (COUMADIN) 5 MG tablet Take Coumadin 7.5 mg once a day on Mondays ,Wednesdays, Fridays , and Sunday and 5 mg once a day on other days. No current facility-administered medications for this visit. ALLERGIES:      No Known Allergies    SH:    Social History     Tobacco Use    Smoking status: Never    Smokeless tobacco: Never   Vaping Use    Vaping Use: Never used   Substance Use Topics    Alcohol use: No    Drug use: No       FH:    Family History   Problem Relation Age of Onset    Heart Disease Maternal Grandfather     Heart Disease Paternal Grandmother     Stroke Paternal Grandmother     Stroke Paternal Grandfather     Dementia Maternal Grandmother     Cancer Father         Prostate    Diabetes Father     Hypertension Father     Cancer Mother         Prostate    Breast Cancer Mother     Osteoarthritis Mother     COPD Mother     Thyroid Disease Mother     Heart Disease Mother     Alzheimer's Disease Maternal Grandmother        Review of systems:  Review of Systems   Constitutional:  Negative for chills, fatigue and fever. HENT:  Negative for sinus pressure, sinus pain, sneezing and sore throat. Eyes:  Negative for pain, discharge and itching. Glasses   Respiratory:  Positive for shortness of breath. Negative for cough and wheezing. Cardiovascular:  Negative for chest pain and palpitations. PVC's   Dr. Mohan Whatley @ [facility]   Gastrointestinal:  Positive for diarrhea. Negative for abdominal pain, constipation, nausea and rectal pain. Endocrine: Negative for cold intolerance, heat intolerance and polydipsia. Genitourinary:  Positive for frequency. Negative for difficulty urinating, dysuria and hematuria. Musculoskeletal:  Negative for back pain, gait problem and joint swelling. Skin:  Negative for color change, pallor and rash. Allergic/Immunologic: Negative for environmental allergies, food allergies and immunocompromised state. Neurological:  Negative for dizziness, light-headedness and numbness. Hematological:  Negative for adenopathy. Bruises/bleeds easily.    Psychiatric/Behavioral: Positive for sleep disturbance. Negative for confusion. The patient is not nervous/anxious. Physical Exam:     /70   Pulse 72   Wt 258 lb 1.6 oz (117.1 kg)   BMI 37.03 kg/m²     General:  Well-developed, well-nourished, no distress. Psych:  Cooperative, good insight and judgement. Neuro:  Alert, oriented to person, place and time. HEENT:  Normocephalic, atraumatic. Sclera clear. Posterior scalp 3 cm soft deformity. Not really a cyst - wondered if a hematoma formed after fall and stayed. Lungs:  Unlabored breathing. Symmetrical chest expansion. Chest wall:  No tenderness or deformity. Heart:  Regular rate and rhythm. No JVD. Extremities:  Extremities normal, atraumatic, no cyanosis or edema. Skin:  Skin color, texture, turgor normal. No rashes. Labs: All recent labs were reviewed. Normal lytes. Diagnosis Orders   1. Scalp mass              Assessment/Plan:  Lacey Joshua is a [de-identified] y.o. male who has signs and symptoms consistent with scalp mass    We discussed excision in the operating room due to the risk of bleeding. He would like to wait at this time and not have it removed. He does not think is affecting his quality of life enough at this time to undergo surgery      He will return once he desires resection. Time: I spent 30 minutes preparing to see patient (including chart review and preparation), obtaining and/or reviewing additional medical history, performing a physical exam and evaluation, documenting clinical information in the electronic health record, independently interpreting results, communicating results to patient, family or caregiver, and/or coordinating care.       Signed: Tiff Zhao MD  Bariatric & Minimally Invasive Surgery  2022 1:49 PM

## 2022-11-29 ENCOUNTER — OFFICE VISIT (OUTPATIENT)
Dept: SURGERY | Age: 82
End: 2022-11-29
Payer: MEDICARE

## 2022-11-29 VITALS
WEIGHT: 258.1 LBS | HEART RATE: 72 BPM | DIASTOLIC BLOOD PRESSURE: 70 MMHG | SYSTOLIC BLOOD PRESSURE: 123 MMHG | BODY MASS INDEX: 37.03 KG/M2

## 2022-11-29 DIAGNOSIS — R22.0 SCALP MASS: Primary | ICD-10-CM

## 2022-11-29 PROCEDURE — G8484 FLU IMMUNIZE NO ADMIN: HCPCS | Performed by: SURGERY

## 2022-11-29 PROCEDURE — G8427 DOCREV CUR MEDS BY ELIG CLIN: HCPCS | Performed by: SURGERY

## 2022-11-29 PROCEDURE — G8417 CALC BMI ABV UP PARAM F/U: HCPCS | Performed by: SURGERY

## 2022-11-29 PROCEDURE — 3074F SYST BP LT 130 MM HG: CPT | Performed by: SURGERY

## 2022-11-29 PROCEDURE — 99203 OFFICE O/P NEW LOW 30 MIN: CPT | Performed by: SURGERY

## 2022-11-29 PROCEDURE — G9692 HOSP RECD BY PT DUR MSMT PER: HCPCS | Performed by: SURGERY

## 2022-11-29 PROCEDURE — 1036F TOBACCO NON-USER: CPT | Performed by: SURGERY

## 2022-11-29 PROCEDURE — 3078F DIAST BP <80 MM HG: CPT | Performed by: SURGERY

## 2022-11-29 ASSESSMENT — ENCOUNTER SYMPTOMS
ABDOMINAL PAIN: 0
COLOR CHANGE: 0
SINUS PAIN: 0
EYE DISCHARGE: 0
EYE ITCHING: 0
WHEEZING: 0
SINUS PRESSURE: 0
SORE THROAT: 0
CONSTIPATION: 0
BACK PAIN: 0
COUGH: 0
EYE PAIN: 0
RECTAL PAIN: 0
SHORTNESS OF BREATH: 1
DIARRHEA: 1
NAUSEA: 0

## 2022-12-08 DIAGNOSIS — M54.50 MIDLINE LOW BACK PAIN WITHOUT SCIATICA, UNSPECIFIED CHRONICITY: ICD-10-CM

## 2022-12-12 RX ORDER — TRAMADOL HYDROCHLORIDE 50 MG/1
50 TABLET ORAL EVERY 12 HOURS PRN
Qty: 60 TABLET | Refills: 0 | Status: SHIPPED | OUTPATIENT
Start: 2022-12-12 | End: 2023-01-11

## 2022-12-12 NOTE — TELEPHONE ENCOUNTER
I have reviewed the patients controlled substance prescription history, as maintained in the [facility] prescription monitoring program, so that the prescription(s) for a  controlled substance can be given.

## 2022-12-16 ENCOUNTER — TELEPHONE (OUTPATIENT)
Dept: INTERNAL MEDICINE CLINIC | Facility: CLINIC | Age: 82
End: 2022-12-16

## 2022-12-21 ENCOUNTER — TELEPHONE (OUTPATIENT)
Dept: INTERNAL MEDICINE CLINIC | Facility: CLINIC | Age: 82
End: 2022-12-21

## 2023-01-16 ENCOUNTER — OFFICE VISIT (OUTPATIENT)
Dept: ENT CLINIC | Age: 83
End: 2023-01-16
Payer: MEDICARE

## 2023-01-16 VITALS — OXYGEN SATURATION: 100 % | HEIGHT: 70 IN | RESPIRATION RATE: 24 BRPM | BODY MASS INDEX: 37.22 KG/M2 | WEIGHT: 260 LBS

## 2023-01-16 DIAGNOSIS — J32.4 CHRONIC PANSINUSITIS: ICD-10-CM

## 2023-01-16 DIAGNOSIS — Z98.890 H/O ENDOSCOPIC SINUS SURGERY: ICD-10-CM

## 2023-01-16 DIAGNOSIS — H90.3 SENSORINEURAL HEARING LOSS (SNHL) OF BOTH EARS: ICD-10-CM

## 2023-01-16 DIAGNOSIS — J34.89 NASAL OBSTRUCTION: ICD-10-CM

## 2023-01-16 DIAGNOSIS — J30.9 ALLERGIC RHINITIS, UNSPECIFIED SEASONALITY, UNSPECIFIED TRIGGER: Primary | ICD-10-CM

## 2023-01-16 PROCEDURE — 31231 NASAL ENDOSCOPY DX: CPT | Performed by: STUDENT IN AN ORGANIZED HEALTH CARE EDUCATION/TRAINING PROGRAM

## 2023-01-16 PROCEDURE — G9692 HOSP RECD BY PT DUR MSMT PER: HCPCS | Performed by: STUDENT IN AN ORGANIZED HEALTH CARE EDUCATION/TRAINING PROGRAM

## 2023-01-16 PROCEDURE — 1036F TOBACCO NON-USER: CPT | Performed by: STUDENT IN AN ORGANIZED HEALTH CARE EDUCATION/TRAINING PROGRAM

## 2023-01-16 PROCEDURE — G8427 DOCREV CUR MEDS BY ELIG CLIN: HCPCS | Performed by: STUDENT IN AN ORGANIZED HEALTH CARE EDUCATION/TRAINING PROGRAM

## 2023-01-16 PROCEDURE — G8417 CALC BMI ABV UP PARAM F/U: HCPCS | Performed by: STUDENT IN AN ORGANIZED HEALTH CARE EDUCATION/TRAINING PROGRAM

## 2023-01-16 PROCEDURE — 99213 OFFICE O/P EST LOW 20 MIN: CPT | Performed by: STUDENT IN AN ORGANIZED HEALTH CARE EDUCATION/TRAINING PROGRAM

## 2023-01-16 PROCEDURE — G8484 FLU IMMUNIZE NO ADMIN: HCPCS | Performed by: STUDENT IN AN ORGANIZED HEALTH CARE EDUCATION/TRAINING PROGRAM

## 2023-01-16 ASSESSMENT — ENCOUNTER SYMPTOMS
EYE DISCHARGE: 0
ABDOMINAL PAIN: 0
COUGH: 0

## 2023-01-16 NOTE — PROGRESS NOTES
[place] [facility] Office Note    Patient: Nargis Garcia  MRN: [id number]  : 1940  Gender:  male  Vital Signs: Resp 24   Ht 5' 10\" (1.778 m)   Wt 260 lb (117.9 kg)   SpO2 100%   BMI 37.31 kg/m²   Date: 2023    CC:   Chief Complaint   Patient presents with    Follow-up     Patient here for his 3 month follow-up on his sinus. He is much better. He states his hearing has gotten worse over the last couple of months. HPI:  Nargis Garcia is a [de-identified] y.o. male status post left maxillary antrostomy tissue removal, total ethmoidectomy, and frontal balloon sinuplasty on 2022. He is doing well. He finished his nasal steroid rinse. He is not using any intranasal medications at this point. He notes occasional rhinorrhea but has Atrovent for this. He endorses progressive hearing loss. He does not wear hearing aids. He has not had an audiogram recently. Past Medical History, Past Surgical History, Family history, Social History, and Medications were all reviewed with the patient today and updated as necessary.      No Known Allergies  Patient Active Problem List   Diagnosis    Abnormal gait    CIDP (chronic inflammatory demyelinating polyneuropathy) ([facility])    Lumbar stenosis    Hypertension    Bradycardia    Bilateral edema of lower extremity    Controlled type 2 diabetes mellitus with complication, without long-term current use of insulin ([facility])    PVC (premature ventricular contraction)    Chronic midline low back pain without sciatica    Status post laminectomy    Bacteremia due to Pseudomonas    History of pulmonary embolism    Chronic fatigue    HERNANDEZ (dyspnea on exertion)    Hematuria    Frequent falls    Spinal stenosis of lumbar region with radiculopathy    Weakness of lower extremity    Ventricular enlargement, right    Nonrheumatic mitral valve regurgitation    Enlarged prostate    Hx of decompressive lumbar laminectomy    Gastroesophageal reflux disease    Back pain Restrictive lung disease    Urinary incontinence    Cervical spinal stenosis    Fall at home    Hypogonadism male    Atrial enlargement, right    Obesity    Background diabetic retinopathy ([address]    Unsteady gait    At risk for falls    Intermittent diarrhea    Obstructive sleep apnea (adult) (pediatric)    Personal history of thromboembolic disease    Lower extremity edema    Benign prostatic hyperplasia    Anticoagulated on Coumadin    Fracture of vertebra due to osteoporosis with routine healing    Arthritis    History of sepsis    Difficulty in walking    Obstructive sleep apnea    Loss of balance    Ureteral calculus    Mood disorder (HCC)    Closed compression fracture of body of L1 vertebra ([facility])    Hospice care patient    Abnormal chest CT    Chronic anticoagulation    Incontinence of feces    CKD (chronic kidney disease)    Neuropathy    Primary osteoarthritis of right knee    Weakness generalized    Chronic pansinusitis    Chronic maxillary sinusitis    Chronic ethmoidal sinusitis    Chronic frontal sinusitis    Thrombocytopenia, unspecified     Current Outpatient Medications   Medication Sig    pravastatin (PRAVACHOL) 80 MG tablet Take 1 tablet by mouth at bedtime    tamsulosin (FLOMAX) 0.4 MG capsule Take 1 capsule by mouth at bedtime    furosemide (LASIX) 20 MG tablet Take 1 tablet by mouth daily    ondansetron (ZOFRAN ODT) 4 MG disintegrating tablet Take 1 tablet by mouth every 8 hours as needed for Nausea or Vomiting    famotidine (PEPCID) 20 MG tablet Take 1 tablet by mouth in the morning and 1 tablet before bedtime.     ipratropium (ATROVENT) 0.03 % nasal spray 2 sprays by Each Nostril route 3 times daily as needed for Rhinitis    ACCU-CHEK LEELA PLUS strip Check BS once per day, Indication E11.9    Cholecalciferol 50 MCG (2000 UT) TABS Take by mouth    metFORMIN (GLUCOPHAGE-XR) 500 MG extended release tablet Take 2 tablet in the morning and 2 in the evening    triamcinolone (NASACORT) 55 MCG/ACT nasal inhaler 1 spray in each nostril twice a day as needed    warfarin (COUMADIN) 5 MG tablet Take Coumadin 7.5 mg once a day on Mondays ,Wednesdays, Fridays , and Sunday and 5 mg once a day on other days. potassium chloride (KLOR-CON M) 10 MEQ extended release tablet Take 1 tablet by mouth 2 times daily    metoprolol tartrate (LOPRESSOR) 25 MG tablet Take 1 tablet by mouth 2 times daily     No current facility-administered medications for this visit. Past Medical History:   Diagnosis Date    Arrhythmia     Arthritis 6/23/2015    BDR (background diabetic retinopathy) ([facility] 6/23/2015    Cancer ([facility]     Depression 6/23/2015    Diabetes mellitus type 2, controlled ([facility]     Diabetic retinopathy ([facility] 6/23/2015    Frequent PVCs     takes beta blocker    Heartburn 6/23/2015    Hx of blood clots     pt takes Coumadin    Hyperlipidemia 6/23/2015    Hypertension     managed with medication    Right-sided heart failure ([facility]     Sleep apnea     Spinal stenosis     Spinal stenosis of lumbar region with radiculopathy 10/17/2017    Status post laminectomy 10/17/2017    status post L3-4, L4-5 decompressive laminectomy with excision of disc on 04/06/2016       Type II or unspecified type diabetes mellitus without mention of complication, not stated as uncontrolled      Social History     Tobacco Use    Smoking status: Never    Smokeless tobacco: Never   Substance Use Topics    Alcohol use: No     Past Surgical History:   Procedure Laterality Date    CATARACT REMOVAL Bilateral     COLONOSCOPY  10/10/2017    Moderate diverticulosis other side normal fiding.  no more rpt    CYST REMOVAL      CYST REMOVAL      HEENT Right 2014    Cell removal; cheek MOHS Surgery    ORTHOPEDIC SURGERY  04/05/2016    back surgery Decompressive Laminectomy Lumbar    SINUS ENDOSCOPY Left 09/23/2022    SINUS ENDOSCOPY FUNCTIONAL SURGERY BALLOON SINUPLASTY Left Maxillary Antrostomy with Tissue Removal, Left Ethmoidectomy, Left Frontal Balloon Sinuplasties performed by Shelly Case MD at [facility]     Family History   Problem Relation Age of Onset    Heart Disease Maternal Grandfather     Heart Disease Paternal Grandmother     Stroke Paternal Grandmother     Stroke Paternal Grandfather     Dementia Maternal Grandmother     Cancer Father         Prostate    Diabetes Father     Hypertension Father     Cancer Mother         Prostate    Breast Cancer Mother     Osteoarthritis Mother     COPD Mother     Thyroid Disease Mother     Heart Disease Mother     Alzheimer's Disease Maternal Grandmother         ROS:    Review of Systems   Constitutional:  Negative for fever. HENT:  Positive for hearing loss. Negative for ear discharge and ear pain. Eyes:  Negative for discharge. Respiratory:  Negative for cough. Cardiovascular:  Negative for chest pain. Gastrointestinal:  Negative for abdominal pain. Musculoskeletal:  Negative for neck pain. Skin:  Negative for rash. Allergic/Immunologic: Negative for environmental allergies. Neurological:  Negative for dizziness. Hematological:  Negative for adenopathy. Psychiatric/Behavioral:  Negative for agitation. PHYSICAL EXAM:    Resp 24   Ht 5' 10\" (1.778 m)   Wt 260 lb (117.9 kg)   SpO2 100%   BMI 37.31 kg/m²     General: NAD, well-appearing  Neuro: No gross neuro deficits. CN's II-XII intact. No facial weakness. Eyes: EOMI. Pupils reactive. No periorbital edema/ecchymosis. Skin: No facial erythema, rashes or concerning lesions. Nose: No external deviations or saddling. Intranasally, septum is midline without perforations, nasal mucosa appears healthy with no erythema, mucopurulence, or polyps. Mouth: Moist mucus membranes, normal tongue/palate mobility, no concerning mucosal lesions. Oropharynx: clear with no erythema/exudate, no tonsillar hypertrophy. Ears: Normal appearing auricles, no hematomas.  EACs clear with no cerumen impaction, healthy canal skin, TM's intact with no perforations or retraction pockets. No middle ear effusions. Neck: Soft, supple, no palpable lateral neck masses. No parotid or submandibular masses. No thyromegaly or palpable thyroid nodules. No surgical scars. Lymphatics: No palpable cervical LAD. Resp/Lungs: No audible stridor or wheezing, CTAB  Heart: RRR  Extremities: No clubbing or cyanosis. PROCEDURE: Nasal endoscopy  INDICATIONS: nasal obstruction  DESCRIPTION: After verbal consent was obtained and a timeout was performed, the 0 degree rigid nasal endoscope was advanced into both nares. The septum was midline w/ no perforations. There were no masses seen along the nasal floor or within the nasopharynx. On the R side, the mucosa was healthy and the turbinates were intact. The middle meatus was clear w/ no edema, polyps or mucopurulence. On the L side, maxillary sinus, ethmoid cavity, and frontal recess are patent and healthy-appearing. The sphenoethmoidal recess was examined bilaterally and was free of pus or polyposis. The scope was then removed and the patient tolerated the procedure well w/ no complications. ASSESSMENT and PLAN  [de-identified] y.o. male status post left maxillary antrostomy tissue removal, total ethmoidectomy, and frontal balloon sinuplasty on 9-. He is doing well. Nasal endoscopy looks good. He can use Atrovent as needed for rhinorrhea. I recommended he schedule an audiogram in [place] and consider hearing aids. ICD-10-CM    1. Allergic rhinitis, unspecified seasonality, unspecified trigger  J30.9       2. Nasal obstruction  J34.89 NASAL ENDOSCOPY,DX      3. Sensorineural hearing loss (SNHL) of both ears  H90.3       4. Chronic pansinusitis  J32.4       5. H/O endoscopic sinus surgery  Z98.890             Jorden Mohs, MD  1/16/2023  Electronically signed    Note dictated using voice recognition software. Please excuse any typos.

## 2023-01-17 ENCOUNTER — PATIENT MESSAGE (OUTPATIENT)
Dept: INTERNAL MEDICINE CLINIC | Facility: CLINIC | Age: 83
End: 2023-01-17

## 2023-01-17 DIAGNOSIS — M54.50 MIDLINE LOW BACK PAIN WITHOUT SCIATICA, UNSPECIFIED CHRONICITY: ICD-10-CM

## 2023-01-18 RX ORDER — TRAMADOL HYDROCHLORIDE 50 MG/1
50 TABLET ORAL EVERY 12 HOURS PRN
Qty: 60 TABLET | Refills: 0 | Status: SHIPPED | OUTPATIENT
Start: 2023-01-18 | End: 2023-02-17

## 2023-01-18 NOTE — TELEPHONE ENCOUNTER
From: Bal Blunt  To: Dr. Candelario Pack: 1/17/2023 6:45 PM EST  Subject: Tramadol monthly refill    Hello  Happy new year   Its time to refill my Tramadol 50mg BID     thank you

## 2023-01-18 NOTE — TELEPHONE ENCOUNTER
I left a message on patient's voicemail that the gastric emptying scan initially was delayed however at 4:00 a m  was normal, thus this is considered a normal study  I was also calling to see how she was feeling, I asked her to call back to let us know that she received a message and also to update us on how she is feeling  Please see that she does have a follow-up office appointment with me at some point    Thank you Pt requesting refill of medication. Medication pended.

## 2023-01-27 ENCOUNTER — PATIENT MESSAGE (OUTPATIENT)
Dept: INTERNAL MEDICINE CLINIC | Facility: CLINIC | Age: 83
End: 2023-01-27

## 2023-01-27 DIAGNOSIS — E11.8 CONTROLLED TYPE 2 DIABETES MELLITUS WITH COMPLICATION, WITHOUT LONG-TERM CURRENT USE OF INSULIN (HCC): Primary | ICD-10-CM

## 2023-01-27 RX ORDER — GLUCOSAMINE HCL/CHONDROITIN SU 500-400 MG
CAPSULE ORAL
Qty: 100 STRIP | Refills: 11 | Status: SHIPPED | OUTPATIENT
Start: 2023-01-27

## 2023-01-27 RX ORDER — LANCETS 30 GAUGE
EACH MISCELLANEOUS
Qty: 100 EACH | Refills: 11 | Status: SHIPPED | OUTPATIENT
Start: 2023-01-27

## 2023-01-27 NOTE — TELEPHONE ENCOUNTER
From: Ana María May  To: Dr. Fox Quick: 1/27/2023 7:46 AM EST  Subject: Medicare DIABETIC SUPPLIES ORDER RENEWAL    Hello  The pharmacy says we have to renew the Dm testing supply order every 6 months. Please send in a new order for BGM & supplies for 2023.     Thank you

## 2023-03-06 DIAGNOSIS — E11.69 HYPERLIPIDEMIA ASSOCIATED WITH TYPE 2 DIABETES MELLITUS (HCC): ICD-10-CM

## 2023-03-06 DIAGNOSIS — E11.65 UNCONTROLLED TYPE 2 DIABETES MELLITUS WITH HYPERGLYCEMIA (HCC): ICD-10-CM

## 2023-03-06 DIAGNOSIS — E78.5 HYPERLIPIDEMIA ASSOCIATED WITH TYPE 2 DIABETES MELLITUS (HCC): ICD-10-CM

## 2023-03-06 LAB
ALBUMIN SERPL-MCNC: 3.5 G/DL (ref 3.2–4.6)
ALBUMIN/GLOB SERPL: 1.3 (ref 0.4–1.6)
ALP SERPL-CCNC: 53 U/L (ref 50–136)
ALT SERPL-CCNC: 42 U/L (ref 12–65)
ANION GAP SERPL CALC-SCNC: 3 MMOL/L (ref 2–11)
AST SERPL-CCNC: 22 U/L (ref 15–37)
BASOPHILS # BLD: 0 K/UL (ref 0–0.2)
BASOPHILS NFR BLD: 0 % (ref 0–2)
BILIRUB SERPL-MCNC: 0.7 MG/DL (ref 0.2–1.1)
BUN SERPL-MCNC: 20 MG/DL (ref 8–23)
CALCIUM SERPL-MCNC: 9.3 MG/DL (ref 8.3–10.4)
CHLORIDE SERPL-SCNC: 108 MMOL/L (ref 101–110)
CHOLEST SERPL-MCNC: 178 MG/DL
CO2 SERPL-SCNC: 30 MMOL/L (ref 21–32)
CREAT SERPL-MCNC: 1.1 MG/DL (ref 0.8–1.5)
DIFFERENTIAL METHOD BLD: ABNORMAL
EOSINOPHIL # BLD: 0.2 K/UL (ref 0–0.8)
EOSINOPHIL NFR BLD: 4 % (ref 0.5–7.8)
ERYTHROCYTE [DISTWIDTH] IN BLOOD BY AUTOMATED COUNT: 13.7 % (ref 11.9–14.6)
EST. AVERAGE GLUCOSE BLD GHB EST-MCNC: 203 MG/DL
GLOBULIN SER CALC-MCNC: 2.8 G/DL (ref 2.8–4.5)
GLUCOSE SERPL-MCNC: 208 MG/DL (ref 65–100)
HBA1C MFR BLD: 8.7 % (ref 4.8–5.6)
HCT VFR BLD AUTO: 48.4 % (ref 41.1–50.3)
HDLC SERPL-MCNC: 46 MG/DL (ref 40–60)
HDLC SERPL: 3.9
HGB BLD-MCNC: 15.9 G/DL (ref 13.6–17.2)
IMM GRANULOCYTES # BLD AUTO: 0 K/UL (ref 0–0.5)
IMM GRANULOCYTES NFR BLD AUTO: 1 % (ref 0–5)
LDLC SERPL CALC-MCNC: 74.6 MG/DL
LYMPHOCYTES # BLD: 1.6 K/UL (ref 0.5–4.6)
LYMPHOCYTES NFR BLD: 30 % (ref 13–44)
MCH RBC QN AUTO: 29.5 PG (ref 26.1–32.9)
MCHC RBC AUTO-ENTMCNC: 32.9 G/DL (ref 31.4–35)
MCV RBC AUTO: 89.8 FL (ref 82–102)
MONOCYTES # BLD: 0.4 K/UL (ref 0.1–1.3)
MONOCYTES NFR BLD: 7 % (ref 4–12)
NEUTS SEG # BLD: 3.2 K/UL (ref 1.7–8.2)
NEUTS SEG NFR BLD: 58 % (ref 43–78)
NRBC # BLD: 0 K/UL (ref 0–0.2)
PLATELET # BLD AUTO: 106 K/UL (ref 150–450)
PMV BLD AUTO: 10.3 FL (ref 9.4–12.3)
POTASSIUM SERPL-SCNC: 4.6 MMOL/L (ref 3.5–5.1)
PROT SERPL-MCNC: 6.3 G/DL (ref 6.3–8.2)
RBC # BLD AUTO: 5.39 M/UL (ref 4.23–5.6)
SODIUM SERPL-SCNC: 141 MMOL/L (ref 133–143)
TRIGL SERPL-MCNC: 287 MG/DL (ref 35–150)
VLDLC SERPL CALC-MCNC: 57.4 MG/DL (ref 6–23)
WBC # BLD AUTO: 5.4 K/UL (ref 4.3–11.1)

## 2023-03-16 ENCOUNTER — PATIENT MESSAGE (OUTPATIENT)
Dept: INTERNAL MEDICINE CLINIC | Facility: CLINIC | Age: 83
End: 2023-03-16

## 2023-03-16 DIAGNOSIS — M54.50 MIDLINE LOW BACK PAIN WITHOUT SCIATICA, UNSPECIFIED CHRONICITY: ICD-10-CM

## 2023-03-16 NOTE — TELEPHONE ENCOUNTER
From: Domingo Luciano  To: Dr. Vasile Layne: 3/16/2023 1:26 PM EDT  Subject: Refills     We have gone back to [address] on [address]   My appt is 3/27- I need refill for Metformin sent please. Ill run out a few days before my appt.    Thanks

## 2023-03-17 RX ORDER — METFORMIN HYDROCHLORIDE 500 MG/1
TABLET, EXTENDED RELEASE ORAL
Qty: 360 TABLET | Refills: 2 | Status: SHIPPED | OUTPATIENT
Start: 2023-03-17

## 2023-03-17 RX ORDER — TRAMADOL HYDROCHLORIDE 50 MG/1
50 TABLET ORAL EVERY 12 HOURS PRN
Qty: 60 TABLET | Refills: 0 | Status: SHIPPED | OUTPATIENT
Start: 2023-03-20 | End: 2023-04-19

## 2023-03-17 NOTE — TELEPHONE ENCOUNTER
I have reviewed the patients controlled substance prescription history, as maintained in the [facility] prescription monitoring program, so that the prescription(s) for a  controlled substance can be given.

## 2023-03-27 ENCOUNTER — OFFICE VISIT (OUTPATIENT)
Dept: INTERNAL MEDICINE CLINIC | Facility: CLINIC | Age: 83
End: 2023-03-27
Payer: MEDICARE

## 2023-03-27 VITALS
HEART RATE: 62 BPM | BODY MASS INDEX: 37.31 KG/M2 | SYSTOLIC BLOOD PRESSURE: 139 MMHG | DIASTOLIC BLOOD PRESSURE: 83 MMHG | HEIGHT: 70 IN | OXYGEN SATURATION: 94 %

## 2023-03-27 DIAGNOSIS — E78.5 HYPERLIPIDEMIA ASSOCIATED WITH TYPE 2 DIABETES MELLITUS (HCC): ICD-10-CM

## 2023-03-27 DIAGNOSIS — G89.29 CHRONIC MIDLINE LOW BACK PAIN, UNSPECIFIED WHETHER SCIATICA PRESENT: ICD-10-CM

## 2023-03-27 DIAGNOSIS — D69.6 THROMBOCYTOPENIA, UNSPECIFIED (HCC): ICD-10-CM

## 2023-03-27 DIAGNOSIS — M54.50 CHRONIC MIDLINE LOW BACK PAIN, UNSPECIFIED WHETHER SCIATICA PRESENT: ICD-10-CM

## 2023-03-27 DIAGNOSIS — Z98.890 STATUS POST LAMINECTOMY: ICD-10-CM

## 2023-03-27 DIAGNOSIS — F39 UNSPECIFIED MOOD (AFFECTIVE) DISORDER (HCC): ICD-10-CM

## 2023-03-27 DIAGNOSIS — I10 HYPERTENSION, UNSPECIFIED TYPE: ICD-10-CM

## 2023-03-27 DIAGNOSIS — M54.2 NECK PAIN: ICD-10-CM

## 2023-03-27 DIAGNOSIS — E11.8 CONTROLLED TYPE 2 DIABETES MELLITUS WITH COMPLICATION, WITHOUT LONG-TERM CURRENT USE OF INSULIN (HCC): Primary | ICD-10-CM

## 2023-03-27 DIAGNOSIS — E11.69 HYPERLIPIDEMIA ASSOCIATED WITH TYPE 2 DIABETES MELLITUS (HCC): ICD-10-CM

## 2023-03-27 DIAGNOSIS — E66.01 SEVERE OBESITY (BMI 35.0-39.9) WITH COMORBIDITY (HCC): ICD-10-CM

## 2023-03-27 PROCEDURE — G8417 CALC BMI ABV UP PARAM F/U: HCPCS | Performed by: INTERNAL MEDICINE

## 2023-03-27 PROCEDURE — G8427 DOCREV CUR MEDS BY ELIG CLIN: HCPCS | Performed by: INTERNAL MEDICINE

## 2023-03-27 PROCEDURE — 3075F SYST BP GE 130 - 139MM HG: CPT | Performed by: INTERNAL MEDICINE

## 2023-03-27 PROCEDURE — 3079F DIAST BP 80-89 MM HG: CPT | Performed by: INTERNAL MEDICINE

## 2023-03-27 PROCEDURE — G9687 HOSPICE ANYTIME MSMT PER: HCPCS | Performed by: INTERNAL MEDICINE

## 2023-03-27 PROCEDURE — G8484 FLU IMMUNIZE NO ADMIN: HCPCS | Performed by: INTERNAL MEDICINE

## 2023-03-27 PROCEDURE — 99214 OFFICE O/P EST MOD 30 MIN: CPT | Performed by: INTERNAL MEDICINE

## 2023-03-27 PROCEDURE — G9692 HOSP RECD BY PT DUR MSMT PER: HCPCS | Performed by: INTERNAL MEDICINE

## 2023-03-27 PROCEDURE — 1036F TOBACCO NON-USER: CPT | Performed by: INTERNAL MEDICINE

## 2023-03-27 SDOH — ECONOMIC STABILITY: FOOD INSECURITY: WITHIN THE PAST 12 MONTHS, YOU WORRIED THAT YOUR FOOD WOULD RUN OUT BEFORE YOU GOT MONEY TO BUY MORE.: NEVER TRUE

## 2023-03-27 SDOH — ECONOMIC STABILITY: FOOD INSECURITY: WITHIN THE PAST 12 MONTHS, THE FOOD YOU BOUGHT JUST DIDN'T LAST AND YOU DIDN'T HAVE MONEY TO GET MORE.: NEVER TRUE

## 2023-03-27 SDOH — ECONOMIC STABILITY: HOUSING INSECURITY
IN THE LAST 12 MONTHS, WAS THERE A TIME WHEN YOU DID NOT HAVE A STEADY PLACE TO SLEEP OR SLEPT IN A SHELTER (INCLUDING NOW)?: NO

## 2023-03-27 SDOH — ECONOMIC STABILITY: INCOME INSECURITY: HOW HARD IS IT FOR YOU TO PAY FOR THE VERY BASICS LIKE FOOD, HOUSING, MEDICAL CARE, AND HEATING?: NOT VERY HARD

## 2023-03-27 ASSESSMENT — PATIENT HEALTH QUESTIONNAIRE - PHQ9
1. LITTLE INTEREST OR PLEASURE IN DOING THINGS: 0
SUM OF ALL RESPONSES TO PHQ9 QUESTIONS 1 & 2: 0
SUM OF ALL RESPONSES TO PHQ QUESTIONS 1-9: 0
SUM OF ALL RESPONSES TO PHQ QUESTIONS 1-9: 0
2. FEELING DOWN, DEPRESSED OR HOPELESS: 0
SUM OF ALL RESPONSES TO PHQ QUESTIONS 1-9: 0
SUM OF ALL RESPONSES TO PHQ QUESTIONS 1-9: 0

## 2023-03-27 NOTE — PROGRESS NOTES
03/06/2023    ALKPHOS 53 03/06/2023    AST 22 03/06/2023    ALT 42 03/06/2023    LABGLOM >60 03/06/2023    GFRAA 71 01/11/2022    AGRATIO 2.2 05/09/2022    GLOB 2.8 03/06/2023     Objective     Examination  /83   Pulse 62   Ht 5' 10\" (1.778 m)   SpO2 94%   BMI 37.31 kg/m²     Physical Exam  General appearance: Obese, well looking , No distress Alert and oriented X 3. Well nourished and well hydrated. Sitting in a wheelchair. Head: Normocephalic, atraumatic  Eyes: conjunctivae clear. Neck: Supple, No carotid bruit, no thyromegaly, no adenopathy  Resp: normal effort. Chest clear  Heart: RRR. Normal heart sounds . Abdomen: Obese, soft, non-tender, no masses , no organomegaly. Normal bowel sounds  Extremities: No edema  Neurology: no Focal deficits  Psychology: normal affect.  Mood is normal     MD Radha Campbell

## 2023-03-29 DIAGNOSIS — E11.8 CONTROLLED TYPE 2 DIABETES MELLITUS WITH COMPLICATION, WITHOUT LONG-TERM CURRENT USE OF INSULIN (HCC): ICD-10-CM

## 2023-03-31 RX ORDER — GLUCOSAMINE HCL/CHONDROITIN SU 500-400 MG
CAPSULE ORAL
Qty: 100 STRIP | Refills: 11 | Status: SHIPPED | OUTPATIENT
Start: 2023-03-31

## 2023-04-18 DIAGNOSIS — I10 HYPERTENSION, UNSPECIFIED TYPE: ICD-10-CM

## 2023-04-18 DIAGNOSIS — M54.50 MIDLINE LOW BACK PAIN WITHOUT SCIATICA, UNSPECIFIED CHRONICITY: ICD-10-CM

## 2023-04-18 DIAGNOSIS — R60.0 BILATERAL EDEMA OF LOWER EXTREMITY: ICD-10-CM

## 2023-04-20 RX ORDER — TRAMADOL HYDROCHLORIDE 50 MG/1
50 TABLET ORAL EVERY 12 HOURS PRN
Qty: 60 TABLET | Refills: 0 | Status: SHIPPED | OUTPATIENT
Start: 2023-04-22 | End: 2023-05-22

## 2023-04-20 RX ORDER — FUROSEMIDE 20 MG/1
20 TABLET ORAL DAILY
Qty: 90 TABLET | Refills: 3 | Status: SHIPPED | OUTPATIENT
Start: 2023-04-20

## 2023-04-30 ENCOUNTER — PATIENT MESSAGE (OUTPATIENT)
Dept: INTERNAL MEDICINE CLINIC | Facility: CLINIC | Age: 83
End: 2023-04-30

## 2023-04-30 DIAGNOSIS — Z86.718 PERSONAL HISTORY OF THROMBOEMBOLIC DISEASE: Primary | ICD-10-CM

## 2023-05-01 RX ORDER — WARFARIN SODIUM 7.5 MG/1
TABLET ORAL
Qty: 90 TABLET | Refills: 1 | Status: SHIPPED | OUTPATIENT
Start: 2023-05-01

## 2023-05-01 RX ORDER — WARFARIN SODIUM 5 MG/1
TABLET ORAL
Qty: 90 TABLET | Refills: 1 | Status: SHIPPED | OUTPATIENT
Start: 2023-05-01

## 2023-05-01 NOTE — TELEPHONE ENCOUNTER
From: Magaly Seaman  To: Dr. Arboleda Goodness: 4/30/2023 1:32 PM EDT  Subject: Refill for Jantoven 5mg 90 day supply    5mg TTS  7.5 mg MWF&Sun    Out of refills.  Plz send to [address] [place] [address] Adria    Thanks

## 2023-05-01 NOTE — TELEPHONE ENCOUNTER
Pt requesting refill of medication. Medication pended.      Pt would like a 90 day supply sent in to the pharmacy

## 2023-05-02 ENCOUNTER — TELEPHONE (OUTPATIENT)
Dept: INTERNAL MEDICINE CLINIC | Facility: CLINIC | Age: 83
End: 2023-05-02

## 2023-05-02 DIAGNOSIS — M54.50 MIDLINE LOW BACK PAIN WITHOUT SCIATICA, UNSPECIFIED CHRONICITY: ICD-10-CM

## 2023-05-02 NOTE — TELEPHONE ENCOUNTER
[facility] snot have Tr[facility]ol in stock and does not know when they will have it in. Pt needs this Rx sent in to [facility].  Medication pended

## 2023-05-02 NOTE — TELEPHONE ENCOUNTER
Pt states [address] doesn't have Tramadol and is requesting Rx be transferred to [facility] (due to being a CS)    Pharmacy Info:  [facility] [address] [place] [address]

## 2023-05-05 RX ORDER — TRAMADOL HYDROCHLORIDE 50 MG/1
50 TABLET ORAL EVERY 12 HOURS PRN
Qty: 60 TABLET | Refills: 0 | Status: SHIPPED | OUTPATIENT
Start: 2023-05-05 | End: 2023-06-04

## 2023-07-11 DIAGNOSIS — M54.50 MIDLINE LOW BACK PAIN WITHOUT SCIATICA, UNSPECIFIED CHRONICITY: ICD-10-CM

## 2023-07-13 RX ORDER — TRAMADOL HYDROCHLORIDE 50 MG/1
50 TABLET ORAL EVERY 12 HOURS PRN
Qty: 60 TABLET | Refills: 0 | Status: SHIPPED | OUTPATIENT
Start: 2023-07-13 | End: 2023-08-12

## 2023-08-14 ENCOUNTER — PATIENT MESSAGE (OUTPATIENT)
Dept: INTERNAL MEDICINE CLINIC | Facility: CLINIC | Age: 83
End: 2023-08-14

## 2023-08-14 DIAGNOSIS — M54.50 MIDLINE LOW BACK PAIN WITHOUT SCIATICA, UNSPECIFIED CHRONICITY: ICD-10-CM

## 2023-08-14 RX ORDER — FAMOTIDINE 20 MG/1
20 TABLET, FILM COATED ORAL 2 TIMES DAILY
Qty: 180 TABLET | Refills: 3 | Status: SHIPPED | OUTPATIENT
Start: 2023-08-14

## 2023-08-15 RX ORDER — TRAMADOL HYDROCHLORIDE 50 MG/1
50 TABLET ORAL EVERY 12 HOURS PRN
Qty: 60 TABLET | Refills: 0 | Status: SHIPPED | OUTPATIENT
Start: 2023-08-15 | End: 2023-09-14

## 2023-08-31 NOTE — PROGRESS NOTES
adenopathy. LUNGS:   Normal respiratory effort with symmetrical lung expansion. Breath sounds clear. HEART:   There is a regular rate and rhythm. No murmur, rub, or gallop. There is 2+ edema in the lower extremities. ABDOMEN:   Soft and non-tender. No hepatosplenomegaly. Bowel sounds are normal.     NEURO:   The patient is alert and oriented to person, place, and time. Memory appears intact and mood is normal.  No gross sensorimotor deficits are present. ASSESSMENT:  (Medical Decision Making)      Diagnosis Orders   1. TORSTEN (obstructive sleep apnea) -pt to continue PAP therapy. A new machine has been ordered and I have requested a home set up as pt requires use of wheelchair and it is hard to get him out of the house. DME - DURABLE MEDICAL EQUIPMENT      2. Nocturnal hypoxemia -improved with PAP therapy  DME - DURABLE MEDICAL EQUIPMENT      3. Hypersomnolence -pt's ESS is . We had a long discussion about this. Pt's daughter feels like any additional medication could cause some issues and she would like to avoid that at this time. The hypersomnia does not bother his life and they will keep things as they are for now. PLAN:      Orders Placed This Encounter   Procedures    DME - [address] [place] [address]  Phone: [email or web address]    Patient Name: Claritza Agent  : 1940  Gender: male  Address: [address] [place] [id number]  Last 4 digits of SSN: [unfilled]    [facility] Insurance: Payor: Daniel Zhou / Plan: MEDICARE PART A AND B / Product Type: *No Product type* /   Subscriber ID: [id number] - (Medicare)      AMB Supply Order  Order Details     DME Location:[place] [address] machine please , please provide home set up   Order Date: 2023   The primary encounter diagnosis was TORSTEN (obstructive sleep apnea). Diagnoses of Nocturnal hypoxemia and Hypersomnolence were also pertinent to this visit.           (  X   )New

## 2023-09-01 ENCOUNTER — OFFICE VISIT (OUTPATIENT)
Dept: SLEEP MEDICINE | Age: 83
End: 2023-09-01
Payer: MEDICARE

## 2023-09-01 VITALS
DIASTOLIC BLOOD PRESSURE: 60 MMHG | WEIGHT: 255 LBS | RESPIRATION RATE: 13 BRPM | OXYGEN SATURATION: 92 % | HEART RATE: 55 BPM | SYSTOLIC BLOOD PRESSURE: 122 MMHG | BODY MASS INDEX: 35.7 KG/M2 | HEIGHT: 71 IN

## 2023-09-01 DIAGNOSIS — G47.34 NOCTURNAL HYPOXEMIA: ICD-10-CM

## 2023-09-01 DIAGNOSIS — G47.10 HYPERSOMNOLENCE: ICD-10-CM

## 2023-09-01 DIAGNOSIS — G47.33 OSA (OBSTRUCTIVE SLEEP APNEA): Primary | ICD-10-CM

## 2023-09-01 PROCEDURE — G8427 DOCREV CUR MEDS BY ELIG CLIN: HCPCS | Performed by: PHYSICIAN ASSISTANT

## 2023-09-01 PROCEDURE — 3074F SYST BP LT 130 MM HG: CPT | Performed by: PHYSICIAN ASSISTANT

## 2023-09-01 PROCEDURE — G9692 HOSP RECD BY PT DUR MSMT PER: HCPCS | Performed by: PHYSICIAN ASSISTANT

## 2023-09-01 PROCEDURE — 99214 OFFICE O/P EST MOD 30 MIN: CPT | Performed by: PHYSICIAN ASSISTANT

## 2023-09-01 PROCEDURE — G8417 CALC BMI ABV UP PARAM F/U: HCPCS | Performed by: PHYSICIAN ASSISTANT

## 2023-09-01 PROCEDURE — 1036F TOBACCO NON-USER: CPT | Performed by: PHYSICIAN ASSISTANT

## 2023-09-01 PROCEDURE — 3078F DIAST BP <80 MM HG: CPT | Performed by: PHYSICIAN ASSISTANT

## 2023-09-01 ASSESSMENT — SLEEP AND FATIGUE QUESTIONNAIRES
HOW LIKELY ARE YOU TO NOD OFF OR FALL ASLEEP WHILE SITTING AND READING: 2
HOW LIKELY ARE YOU TO NOD OFF OR FALL ASLEEP WHILE SITTING QUIETLY AFTER LUNCH WITHOUT ALCOHOL: 2
HOW LIKELY ARE YOU TO NOD OFF OR FALL ASLEEP WHEN YOU ARE A PASSENGER IN A CAR FOR AN HOUR WITHOUT A BREAK: 3
ESS TOTAL SCORE: 16
HOW LIKELY ARE YOU TO NOD OFF OR FALL ASLEEP IN A CAR, WHILE STOPPED FOR A FEW MINUTES IN TRAFFIC: 1
HOW LIKELY ARE YOU TO NOD OFF OR FALL ASLEEP WHILE SITTING INACTIVE IN A PUBLIC PLACE: 2
HOW LIKELY ARE YOU TO NOD OFF OR FALL ASLEEP WHILE LYING DOWN TO REST IN THE AFTERNOON WHEN CIRCUMSTANCES PERMIT: 3
HOW LIKELY ARE YOU TO NOD OFF OR FALL ASLEEP WHILE WATCHING TV: 2
HOW LIKELY ARE YOU TO NOD OFF OR FALL ASLEEP WHILE SITTING AND TALKING TO SOMEONE: 1

## 2023-09-01 NOTE — PATIENT INSTRUCTIONS
The company who will be taking care of your CPAP supplies is:     Address: [address] [place] [address] [address], [facility], [address]  Phone: [phone] Option #1  Fax: [phone]

## 2023-09-15 ENCOUNTER — TELEPHONE (OUTPATIENT)
Dept: INTERNAL MEDICINE CLINIC | Facility: CLINIC | Age: 83
End: 2023-09-15

## 2023-09-15 DIAGNOSIS — M54.50 MIDLINE LOW BACK PAIN WITHOUT SCIATICA, UNSPECIFIED CHRONICITY: ICD-10-CM

## 2023-09-15 RX ORDER — TRAMADOL HYDROCHLORIDE 50 MG/1
50 TABLET ORAL EVERY 12 HOURS PRN
Qty: 60 TABLET | Refills: 0 | Status: SHIPPED | OUTPATIENT
Start: 2023-09-15 | End: 2023-10-15

## 2023-09-25 DIAGNOSIS — E11.8 CONTROLLED TYPE 2 DIABETES MELLITUS WITH COMPLICATION, WITHOUT LONG-TERM CURRENT USE OF INSULIN (HCC): Primary | ICD-10-CM

## 2023-09-25 DIAGNOSIS — E11.8 CONTROLLED TYPE 2 DIABETES MELLITUS WITH COMPLICATION, WITHOUT LONG-TERM CURRENT USE OF INSULIN (HCC): ICD-10-CM

## 2023-09-25 DIAGNOSIS — E78.5 HYPERLIPIDEMIA ASSOCIATED WITH TYPE 2 DIABETES MELLITUS (HCC): ICD-10-CM

## 2023-09-25 DIAGNOSIS — Z79.891 LONG TERM (CURRENT) USE OF OPIATE ANALGESIC: ICD-10-CM

## 2023-09-25 DIAGNOSIS — E11.69 HYPERLIPIDEMIA ASSOCIATED WITH TYPE 2 DIABETES MELLITUS (HCC): ICD-10-CM

## 2023-09-25 DIAGNOSIS — I10 HYPERTENSION, UNSPECIFIED TYPE: ICD-10-CM

## 2023-09-25 DIAGNOSIS — D69.6 THROMBOCYTOPENIA, UNSPECIFIED (HCC): ICD-10-CM

## 2023-09-25 DIAGNOSIS — Z79.01 LONG TERM CURRENT USE OF ANTICOAGULANT: ICD-10-CM

## 2023-09-25 LAB
ALBUMIN SERPL-MCNC: 3.4 G/DL (ref 3.2–4.6)
ALBUMIN/GLOB SERPL: 1.4 (ref 0.4–1.6)
ALP SERPL-CCNC: 56 U/L (ref 50–136)
ALT SERPL-CCNC: 43 U/L (ref 12–65)
ANION GAP SERPL CALC-SCNC: 7 MMOL/L (ref 2–11)
AST SERPL-CCNC: 26 U/L (ref 15–37)
BASOPHILS # BLD: 0 K/UL (ref 0–0.2)
BASOPHILS NFR BLD: 0 % (ref 0–2)
BILIRUB SERPL-MCNC: 0.7 MG/DL (ref 0.2–1.1)
BUN SERPL-MCNC: 18 MG/DL (ref 8–23)
CALCIUM SERPL-MCNC: 9.2 MG/DL (ref 8.3–10.4)
CHLORIDE SERPL-SCNC: 110 MMOL/L (ref 101–110)
CHOLEST SERPL-MCNC: 181 MG/DL
CO2 SERPL-SCNC: 31 MMOL/L (ref 21–32)
CREAT SERPL-MCNC: 1.1 MG/DL (ref 0.8–1.5)
CREAT UR-MCNC: 90 MG/DL
DIFFERENTIAL METHOD BLD: ABNORMAL
EOSINOPHIL # BLD: 0.3 K/UL (ref 0–0.8)
EOSINOPHIL NFR BLD: 5 % (ref 0.5–7.8)
ERYTHROCYTE [DISTWIDTH] IN BLOOD BY AUTOMATED COUNT: 13.4 % (ref 11.9–14.6)
EST. AVERAGE GLUCOSE BLD GHB EST-MCNC: 189 MG/DL
GLOBULIN SER CALC-MCNC: 2.5 G/DL (ref 2.8–4.5)
GLUCOSE SERPL-MCNC: 187 MG/DL (ref 65–100)
HBA1C MFR BLD: 8.2 % (ref 4.8–5.6)
HCT VFR BLD AUTO: 46 % (ref 41.1–50.3)
HDLC SERPL-MCNC: 41 MG/DL (ref 40–60)
HDLC SERPL: 4.4
HGB BLD-MCNC: 15.1 G/DL (ref 13.6–17.2)
IMM GRANULOCYTES # BLD AUTO: 0 K/UL (ref 0–0.5)
IMM GRANULOCYTES NFR BLD AUTO: 1 % (ref 0–5)
LDLC SERPL CALC-MCNC: 67.2 MG/DL
LYMPHOCYTES # BLD: 1.6 K/UL (ref 0.5–4.6)
LYMPHOCYTES NFR BLD: 30 % (ref 13–44)
MCH RBC QN AUTO: 29.4 PG (ref 26.1–32.9)
MCHC RBC AUTO-ENTMCNC: 32.8 G/DL (ref 31.4–35)
MCV RBC AUTO: 89.7 FL (ref 82–102)
MICROALBUMIN UR-MCNC: 1.2 MG/DL
MICROALBUMIN/CREAT UR-RTO: 13 MG/G (ref 0–30)
MONOCYTES # BLD: 0.4 K/UL (ref 0.1–1.3)
MONOCYTES NFR BLD: 8 % (ref 4–12)
NEUTS SEG # BLD: 3 K/UL (ref 1.7–8.2)
NEUTS SEG NFR BLD: 56 % (ref 43–78)
NRBC # BLD: 0 K/UL (ref 0–0.2)
PLATELET # BLD AUTO: 100 K/UL (ref 150–450)
PMV BLD AUTO: 10.5 FL (ref 9.4–12.3)
POTASSIUM SERPL-SCNC: 3.6 MMOL/L (ref 3.5–5.1)
PROT SERPL-MCNC: 5.9 G/DL (ref 6.3–8.2)
RBC # BLD AUTO: 5.13 M/UL (ref 4.23–5.6)
SODIUM SERPL-SCNC: 148 MMOL/L (ref 133–143)
TRIGL SERPL-MCNC: 364 MG/DL (ref 35–150)
VLDLC SERPL CALC-MCNC: 72.8 MG/DL (ref 6–23)
WBC # BLD AUTO: 5.3 K/UL (ref 4.3–11.1)

## 2023-09-28 LAB — DRUGS UR: NORMAL

## 2023-10-02 ENCOUNTER — OFFICE VISIT (OUTPATIENT)
Dept: INTERNAL MEDICINE CLINIC | Facility: CLINIC | Age: 83
End: 2023-10-02
Payer: MEDICARE

## 2023-10-02 VITALS
BODY MASS INDEX: 35.7 KG/M2 | HEART RATE: 43 BPM | SYSTOLIC BLOOD PRESSURE: 151 MMHG | HEIGHT: 71 IN | DIASTOLIC BLOOD PRESSURE: 72 MMHG | WEIGHT: 255 LBS | OXYGEN SATURATION: 94 % | RESPIRATION RATE: 16 BRPM | TEMPERATURE: 97.1 F

## 2023-10-02 DIAGNOSIS — G89.29 CHRONIC MIDLINE LOW BACK PAIN, UNSPECIFIED WHETHER SCIATICA PRESENT: ICD-10-CM

## 2023-10-02 DIAGNOSIS — M54.50 CHRONIC MIDLINE LOW BACK PAIN, UNSPECIFIED WHETHER SCIATICA PRESENT: ICD-10-CM

## 2023-10-02 DIAGNOSIS — E11.65 UNCONTROLLED TYPE 2 DIABETES MELLITUS WITH HYPERGLYCEMIA (HCC): Primary | ICD-10-CM

## 2023-10-02 DIAGNOSIS — E88.09 SERUM PROTEIN DECREASED: ICD-10-CM

## 2023-10-02 DIAGNOSIS — R60.0 BILATERAL EDEMA OF LOWER EXTREMITY: ICD-10-CM

## 2023-10-02 DIAGNOSIS — E78.5 HYPERLIPIDEMIA ASSOCIATED WITH TYPE 2 DIABETES MELLITUS (HCC): ICD-10-CM

## 2023-10-02 DIAGNOSIS — Z98.890 STATUS POST LAMINECTOMY: ICD-10-CM

## 2023-10-02 DIAGNOSIS — I10 ESSENTIAL (PRIMARY) HYPERTENSION: ICD-10-CM

## 2023-10-02 DIAGNOSIS — E11.69 HYPERLIPIDEMIA ASSOCIATED WITH TYPE 2 DIABETES MELLITUS (HCC): ICD-10-CM

## 2023-10-02 DIAGNOSIS — Z86.718 PERSONAL HISTORY OF THROMBOEMBOLIC DISEASE: ICD-10-CM

## 2023-10-02 DIAGNOSIS — G61.81 CIDP (CHRONIC INFLAMMATORY DEMYELINATING POLYNEUROPATHY) (HCC): ICD-10-CM

## 2023-10-02 DIAGNOSIS — D69.6 THROMBOCYTOPENIA, UNSPECIFIED (HCC): ICD-10-CM

## 2023-10-02 LAB
APPEARANCE UR: CLEAR
BACTERIA URNS QL MICRO: 0 /HPF
BILIRUB UR QL: NEGATIVE
CASTS URNS QL MICRO: ABNORMAL /LPF
COLOR UR: ABNORMAL
GLUCOSE UR STRIP.AUTO-MCNC: 100 MG/DL
HGB UR QL STRIP: ABNORMAL
KETONES UR QL STRIP.AUTO: ABNORMAL MG/DL
LEUKOCYTE ESTERASE UR QL STRIP.AUTO: NEGATIVE
MUCOUS THREADS URNS QL MICRO: ABNORMAL /LPF
NITRITE UR QL STRIP.AUTO: NEGATIVE
OTHER OBSERVATIONS: ABNORMAL
PH UR STRIP: 5.5 (ref 5–9)
PROT UR STRIP-MCNC: ABNORMAL MG/DL
RBC #/AREA URNS HPF: ABNORMAL /HPF
SP GR UR REFRACTOMETRY: 1.02 (ref 1–1.02)
UROBILINOGEN UR QL STRIP.AUTO: 0.2 EU/DL (ref 0.2–1)

## 2023-10-02 PROCEDURE — G8417 CALC BMI ABV UP PARAM F/U: HCPCS | Performed by: INTERNAL MEDICINE

## 2023-10-02 PROCEDURE — 3078F DIAST BP <80 MM HG: CPT | Performed by: INTERNAL MEDICINE

## 2023-10-02 PROCEDURE — 1036F TOBACCO NON-USER: CPT | Performed by: INTERNAL MEDICINE

## 2023-10-02 PROCEDURE — G9687 HOSPICE ANYTIME MSMT PER: HCPCS | Performed by: INTERNAL MEDICINE

## 2023-10-02 PROCEDURE — G8427 DOCREV CUR MEDS BY ELIG CLIN: HCPCS | Performed by: INTERNAL MEDICINE

## 2023-10-02 PROCEDURE — 3074F SYST BP LT 130 MM HG: CPT | Performed by: INTERNAL MEDICINE

## 2023-10-02 PROCEDURE — G8484 FLU IMMUNIZE NO ADMIN: HCPCS | Performed by: INTERNAL MEDICINE

## 2023-10-02 PROCEDURE — 99215 OFFICE O/P EST HI 40 MIN: CPT | Performed by: INTERNAL MEDICINE

## 2023-10-02 PROCEDURE — G9692 HOSP RECD BY PT DUR MSMT PER: HCPCS | Performed by: INTERNAL MEDICINE

## 2023-10-02 RX ORDER — LANCETS 30 GAUGE
EACH MISCELLANEOUS
Qty: 100 EACH | Refills: 11 | Status: CANCELLED | OUTPATIENT
Start: 2023-10-02

## 2023-10-02 RX ORDER — BLOOD SUGAR DIAGNOSTIC
STRIP MISCELLANEOUS
Qty: 100 EACH | Refills: 3 | Status: CANCELLED | OUTPATIENT
Start: 2023-10-02

## 2023-10-02 RX ORDER — TAMSULOSIN HYDROCHLORIDE 0.4 MG/1
0.4 CAPSULE ORAL NIGHTLY
Qty: 90 CAPSULE | Refills: 3 | Status: CANCELLED | OUTPATIENT
Start: 2023-10-02

## 2023-10-02 RX ORDER — TRAMADOL HYDROCHLORIDE 50 MG/1
50 TABLET ORAL EVERY 12 HOURS PRN
Qty: 60 TABLET | Refills: 0 | Status: CANCELLED | OUTPATIENT
Start: 2023-10-02 | End: 2023-11-01

## 2023-10-02 RX ORDER — GLUCOSAMINE HCL/CHONDROITIN SU 500-400 MG
CAPSULE ORAL
Qty: 100 STRIP | Refills: 11 | Status: CANCELLED | OUTPATIENT
Start: 2023-10-02

## 2023-10-02 RX ORDER — FAMOTIDINE 20 MG/1
20 TABLET, FILM COATED ORAL 2 TIMES DAILY
Qty: 180 TABLET | Refills: 3 | Status: CANCELLED | OUTPATIENT
Start: 2023-10-02

## 2023-10-02 SDOH — ECONOMIC STABILITY: FOOD INSECURITY: WITHIN THE PAST 12 MONTHS, THE FOOD YOU BOUGHT JUST DIDN'T LAST AND YOU DIDN'T HAVE MONEY TO GET MORE.: NEVER TRUE

## 2023-10-02 SDOH — ECONOMIC STABILITY: INCOME INSECURITY: HOW HARD IS IT FOR YOU TO PAY FOR THE VERY BASICS LIKE FOOD, HOUSING, MEDICAL CARE, AND HEATING?: NOT HARD AT ALL

## 2023-10-02 SDOH — ECONOMIC STABILITY: FOOD INSECURITY: WITHIN THE PAST 12 MONTHS, YOU WORRIED THAT YOUR FOOD WOULD RUN OUT BEFORE YOU GOT MONEY TO BUY MORE.: NEVER TRUE

## 2023-10-02 ASSESSMENT — ANXIETY QUESTIONNAIRES
1. FEELING NERVOUS, ANXIOUS, OR ON EDGE: 0
6. BECOMING EASILY ANNOYED OR IRRITABLE: 0
2. NOT BEING ABLE TO STOP OR CONTROL WORRYING: 0
7. FEELING AFRAID AS IF SOMETHING AWFUL MIGHT HAPPEN: 0
IF YOU CHECKED OFF ANY PROBLEMS ON THIS QUESTIONNAIRE, HOW DIFFICULT HAVE THESE PROBLEMS MADE IT FOR YOU TO DO YOUR WORK, TAKE CARE OF THINGS AT HOME, OR GET ALONG WITH OTHER PEOPLE: NOT DIFFICULT AT ALL
5. BEING SO RESTLESS THAT IT IS HARD TO SIT STILL: 0
3. WORRYING TOO MUCH ABOUT DIFFERENT THINGS: 0
GAD7 TOTAL SCORE: 0
4. TROUBLE RELAXING: 0

## 2023-10-02 ASSESSMENT — PATIENT HEALTH QUESTIONNAIRE - PHQ9
DEPRESSION UNABLE TO ASSESS: FUNCTIONAL CAPACITY MOTIVATION LIMITS ACCURACY
SUM OF ALL RESPONSES TO PHQ9 QUESTIONS 1 & 2: 0
1. LITTLE INTEREST OR PLEASURE IN DOING THINGS: 0
SUM OF ALL RESPONSES TO PHQ QUESTIONS 1-9: 0
2. FEELING DOWN, DEPRESSED OR HOPELESS: 0
SUM OF ALL RESPONSES TO PHQ QUESTIONS 1-9: 0

## 2023-10-02 NOTE — PROGRESS NOTES
10/2/2023    Chief Complaint   Patient presents with    Follow-up       Assessment and plan     1. Uncontrolled type 2 diabetes mellitus with hyperglycemia (HCC)  Comments:  Hemoglobin A1c less than 8. Orders:  -     metFORMIN (GLUCOPHAGE-XR) 500 MG extended release tablet; Take 2 tablet in the morning and 2 in the evening, Disp-360 tablet, R-2Normal  2. Essential (primary) hypertension  -     metoprolol tartrate (LOPRESSOR) 25 MG tablet; Take 1 tablet by mouth 2 times daily, Disp-180 tablet, R-3Normal  -     potassium chloride (KLOR-CON M) 10 MEQ extended release tablet; Take 1 tablet by mouth daily Taking once a day, Disp-90 tablet, R-2Normal  3. Hyperlipidemia associated with type 2 diabetes mellitus (HCC)  -     pravastatin (PRAVACHOL) 80 MG tablet; Take 1 tablet by mouth at bedtime, Disp-90 tablet, R-3Normal  4. Personal history of thromboembolic disease  -     warfarin (COUMADIN) 7.5 MG tablet; Take Coumadin 7.5 mg once a day on Mondays ,Wednesdays, Fridays , and Sunday, Disp-90 tablet, R-1Normal  -     warfarin (COUMADIN) 5 MG tablet; Take one tablet in the PM On Tuesdays, Thursdays and Saturdays, Disp-90 tablet, R-1Normal  5. Thrombocytopenia, unspecified ([address])  Comments:  Chronic. Asymptomatic. 6. CIDP (chronic inflammatory demyelinating polyneuropathy) ([facility])  Comments: At baseline  7. Status post laminectomy  8. Chronic midline low back pain, unspecified whether sciatica present  Comments:  Seems to be doing well. Not having any pain complaints today. 9. Bilateral edema of lower extremity  -     furosemide (LASIX) 20 MG tablet; Take 1 tablet by mouth daily, Disp-90 tablet, R-3Normal  -     potassium chloride (KLOR-CON M) 10 MEQ extended release tablet; Take 1 tablet by mouth daily Taking once a day, Disp-90 tablet, R-2Normal  10. Serum protein decreased  -     Protein Elect w/Refl OTTONIEL, Ur; Future  -     Urinalysis; Future     Patient seems to be doing well.   Test results discussed today  Prescription

## 2023-10-05 LAB
ALBUMIN MFR UR ELPH: 36.8 %
ALPHA1 GLOB MFR UR ELPH: 5.2 %
ALPHA2 GLOB 24H MFR UR ELPH: 13.8 %
B-GLOBULIN 24H MFR UR ELPH: 22.7 %
GAMMA GLOB 24H MFR UR ELPH: 21.5 %
LABORATORY COMMENT REPORT: NORMAL
M PROTEIN MFR UR ELPH: NORMAL %
PROT UR-MCNC: 24.6 MG/DL

## 2023-10-05 RX ORDER — POTASSIUM CHLORIDE 750 MG/1
10 TABLET, EXTENDED RELEASE ORAL DAILY
Qty: 90 TABLET | Refills: 2 | Status: SHIPPED | OUTPATIENT
Start: 2023-10-05

## 2023-10-05 RX ORDER — PRAVASTATIN SODIUM 80 MG/1
80 TABLET ORAL NIGHTLY
Qty: 90 TABLET | Refills: 3 | Status: SHIPPED | OUTPATIENT
Start: 2023-10-05

## 2023-10-05 RX ORDER — WARFARIN SODIUM 5 MG/1
TABLET ORAL
Qty: 90 TABLET | Refills: 1 | Status: SHIPPED | OUTPATIENT
Start: 2023-10-05

## 2023-10-05 RX ORDER — WARFARIN SODIUM 7.5 MG/1
TABLET ORAL
Qty: 90 TABLET | Refills: 1 | Status: SHIPPED | OUTPATIENT
Start: 2023-10-05

## 2023-10-05 RX ORDER — FUROSEMIDE 20 MG/1
20 TABLET ORAL DAILY
Qty: 90 TABLET | Refills: 3 | Status: SHIPPED | OUTPATIENT
Start: 2023-10-05

## 2023-10-05 RX ORDER — METFORMIN HYDROCHLORIDE 500 MG/1
TABLET, EXTENDED RELEASE ORAL
Qty: 360 TABLET | Refills: 2 | Status: SHIPPED | OUTPATIENT
Start: 2023-10-05

## 2023-10-13 ENCOUNTER — PATIENT MESSAGE (OUTPATIENT)
Dept: INTERNAL MEDICINE CLINIC | Facility: CLINIC | Age: 83
End: 2023-10-13

## 2023-10-13 DIAGNOSIS — N40.0 ENLARGED PROSTATE: ICD-10-CM

## 2023-10-13 DIAGNOSIS — M54.50 MIDLINE LOW BACK PAIN WITHOUT SCIATICA, UNSPECIFIED CHRONICITY: ICD-10-CM

## 2023-10-13 NOTE — TELEPHONE ENCOUNTER
From: Bere Gonzales  To: Dr. Moriah Frasert: 10/13/2023 12:23 PM EDT  Subject: Tamsulosin Rx    Please send refills for TAMSULOSIN to [facility] on [address] [place] [address]. $7.50/ 90 day supply.      Thank you

## 2023-10-16 RX ORDER — TRAMADOL HYDROCHLORIDE 50 MG/1
50 TABLET ORAL EVERY 12 HOURS PRN
Qty: 60 TABLET | Refills: 0 | Status: SHIPPED | OUTPATIENT
Start: 2023-10-17 | End: 2023-11-16

## 2023-10-16 RX ORDER — TAMSULOSIN HYDROCHLORIDE 0.4 MG/1
0.4 CAPSULE ORAL NIGHTLY
Qty: 90 CAPSULE | Refills: 0 | Status: SHIPPED | OUTPATIENT
Start: 2023-10-16

## 2023-11-13 ENCOUNTER — TELEPHONE (OUTPATIENT)
Dept: INTERNAL MEDICINE CLINIC | Facility: CLINIC | Age: 83
End: 2023-11-13

## 2023-11-13 DIAGNOSIS — M54.50 MIDLINE LOW BACK PAIN WITHOUT SCIATICA, UNSPECIFIED CHRONICITY: ICD-10-CM

## 2023-11-13 RX ORDER — TRAMADOL HYDROCHLORIDE 50 MG/1
50 TABLET ORAL EVERY 12 HOURS PRN
Qty: 60 TABLET | Refills: 0 | Status: CANCELLED | OUTPATIENT
Start: 2023-11-13 | End: 2023-12-13

## 2023-11-14 DIAGNOSIS — M54.50 MIDLINE LOW BACK PAIN WITHOUT SCIATICA, UNSPECIFIED CHRONICITY: ICD-10-CM

## 2023-11-14 RX ORDER — TRAMADOL HYDROCHLORIDE 50 MG/1
50 TABLET ORAL EVERY 12 HOURS PRN
Qty: 60 TABLET | Refills: 0 | Status: SHIPPED | OUTPATIENT
Start: 2023-11-15 | End: 2023-12-15

## 2023-11-14 NOTE — TELEPHONE ENCOUNTER
Noted.  Patient can continue current dose of Coumadin.    Patient can recheck Coumadin level per protocol

## 2023-12-18 ENCOUNTER — TELEPHONE (OUTPATIENT)
Dept: INTERNAL MEDICINE CLINIC | Facility: CLINIC | Age: 83
End: 2023-12-18

## 2023-12-18 NOTE — TELEPHONE ENCOUNTER
Spoke with Grey Gonzalez via phone and informed her of change in medication per Dr. Michelle naik, she stated understanding. Details of medication dosage and frequency explained to Grey Gonzalez is follow: On 12/19/23, patient is to take the usual 5 mg + extra 5 mg, total warfarin 10 mg PO QD. Then on following days 12/20, 22, 23, 24, 25, 27, 29, 30 and 12/31/23 total daily dose of warfarin 7.5 mg PO QD.  12/21, 26 and 12/28/23 total daily dose of warfarin 5 mg PO QD. INR recheck on 1/1/2024. The dosage and frequency, dates have been verbally confirmed by MD to LPN.

## 2024-01-11 DIAGNOSIS — E11.69 HYPERLIPIDEMIA ASSOCIATED WITH TYPE 2 DIABETES MELLITUS (HCC): ICD-10-CM

## 2024-01-11 DIAGNOSIS — E78.5 HYPERLIPIDEMIA ASSOCIATED WITH TYPE 2 DIABETES MELLITUS (HCC): ICD-10-CM

## 2024-01-11 DIAGNOSIS — D69.6 THROMBOCYTOPENIA, UNSPECIFIED (HCC): ICD-10-CM

## 2024-01-11 DIAGNOSIS — I10 ESSENTIAL (PRIMARY) HYPERTENSION: Primary | ICD-10-CM

## 2024-01-11 DIAGNOSIS — I10 ESSENTIAL (PRIMARY) HYPERTENSION: ICD-10-CM

## 2024-01-11 DIAGNOSIS — E11.65 UNCONTROLLED TYPE 2 DIABETES MELLITUS WITH HYPERGLYCEMIA (HCC): ICD-10-CM

## 2024-01-11 LAB
ALBUMIN SERPL-MCNC: 3.6 G/DL (ref 3.2–4.6)
ALBUMIN/GLOB SERPL: 1.3 (ref 0.4–1.6)
ALP SERPL-CCNC: 47 U/L (ref 50–136)
ALT SERPL-CCNC: 33 U/L (ref 12–65)
ANION GAP SERPL CALC-SCNC: 7 MMOL/L (ref 2–11)
AST SERPL-CCNC: 23 U/L (ref 15–37)
BASOPHILS # BLD: 0 K/UL (ref 0–0.2)
BASOPHILS NFR BLD: 0 % (ref 0–2)
BILIRUB SERPL-MCNC: 0.9 MG/DL (ref 0.2–1.1)
BUN SERPL-MCNC: 15 MG/DL (ref 8–23)
CALCIUM SERPL-MCNC: 9.4 MG/DL (ref 8.3–10.4)
CHLORIDE SERPL-SCNC: 106 MMOL/L (ref 103–113)
CHOLEST SERPL-MCNC: 149 MG/DL
CO2 SERPL-SCNC: 29 MMOL/L (ref 21–32)
CREAT SERPL-MCNC: 1 MG/DL (ref 0.8–1.5)
DIFFERENTIAL METHOD BLD: ABNORMAL
EOSINOPHIL # BLD: 0.2 K/UL (ref 0–0.8)
EOSINOPHIL NFR BLD: 4 % (ref 0.5–7.8)
ERYTHROCYTE [DISTWIDTH] IN BLOOD BY AUTOMATED COUNT: 13.5 % (ref 11.9–14.6)
GLOBULIN SER CALC-MCNC: 2.7 G/DL (ref 2.8–4.5)
GLUCOSE SERPL-MCNC: 262 MG/DL (ref 65–100)
HCT VFR BLD AUTO: 44.5 % (ref 41.1–50.3)
HDLC SERPL-MCNC: 41 MG/DL (ref 40–60)
HDLC SERPL: 3.6
HGB BLD-MCNC: 15 G/DL (ref 13.6–17.2)
IMM GRANULOCYTES # BLD AUTO: 0 K/UL (ref 0–0.5)
IMM GRANULOCYTES NFR BLD AUTO: 0 % (ref 0–5)
LDLC SERPL CALC-MCNC: 63.8 MG/DL
LYMPHOCYTES # BLD: 1.5 K/UL (ref 0.5–4.6)
LYMPHOCYTES NFR BLD: 26 % (ref 13–44)
MCH RBC QN AUTO: 29.1 PG (ref 26.1–32.9)
MCHC RBC AUTO-ENTMCNC: 33.7 G/DL (ref 31.4–35)
MCV RBC AUTO: 86.4 FL (ref 82–102)
MONOCYTES # BLD: 0.3 K/UL (ref 0.1–1.3)
MONOCYTES NFR BLD: 6 % (ref 4–12)
NEUTS SEG # BLD: 3.5 K/UL (ref 1.7–8.2)
NEUTS SEG NFR BLD: 64 % (ref 43–78)
NRBC # BLD: 0 K/UL (ref 0–0.2)
PLATELET # BLD AUTO: 102 K/UL (ref 150–450)
PMV BLD AUTO: 10.6 FL (ref 9.4–12.3)
POTASSIUM SERPL-SCNC: 3.2 MMOL/L (ref 3.5–5.1)
PROT SERPL-MCNC: 6.3 G/DL (ref 6.3–8.2)
RBC # BLD AUTO: 5.15 M/UL (ref 4.23–5.6)
SODIUM SERPL-SCNC: 142 MMOL/L (ref 136–146)
TRIGL SERPL-MCNC: 221 MG/DL (ref 35–150)
VLDLC SERPL CALC-MCNC: 44.2 MG/DL (ref 6–23)
WBC # BLD AUTO: 5.6 K/UL (ref 4.3–11.1)

## 2024-01-12 LAB
EST. AVERAGE GLUCOSE BLD GHB EST-MCNC: 197 MG/DL
HBA1C MFR BLD: 8.5 % (ref 4.8–5.6)

## 2024-01-15 ENCOUNTER — PATIENT MESSAGE (OUTPATIENT)
Dept: INTERNAL MEDICINE CLINIC | Facility: CLINIC | Age: 84
End: 2024-01-15

## 2024-01-15 DIAGNOSIS — M54.50 MIDLINE LOW BACK PAIN WITHOUT SCIATICA, UNSPECIFIED CHRONICITY: ICD-10-CM

## 2024-01-15 NOTE — TELEPHONE ENCOUNTER
From: Jermaine Bacon  To: Dr. Tisha Paniagua  Sent: 1/15/2024 1:32 PM EST  Subject: Monthly refill    Hello  It’s Tramadol time. I’m using [facility] for 2024.     Thanks  Jermaine

## 2024-01-16 ENCOUNTER — OFFICE VISIT (OUTPATIENT)
Dept: INTERNAL MEDICINE CLINIC | Facility: CLINIC | Age: 84
End: 2024-01-16

## 2024-01-16 ENCOUNTER — TELEPHONE (OUTPATIENT)
Dept: INTERNAL MEDICINE CLINIC | Facility: CLINIC | Age: 84
End: 2024-01-16

## 2024-01-16 VITALS
SYSTOLIC BLOOD PRESSURE: 139 MMHG | BODY MASS INDEX: 35.01 KG/M2 | WEIGHT: 251 LBS | HEART RATE: 73 BPM | DIASTOLIC BLOOD PRESSURE: 80 MMHG | OXYGEN SATURATION: 99 %

## 2024-01-16 DIAGNOSIS — E66.01 SEVERE OBESITY (BMI 35.0-39.9) WITH COMORBIDITY (HCC): ICD-10-CM

## 2024-01-16 DIAGNOSIS — E11.65 TYPE 2 DIABETES MELLITUS WITH HYPERGLYCEMIA, WITHOUT LONG-TERM CURRENT USE OF INSULIN (HCC): ICD-10-CM

## 2024-01-16 DIAGNOSIS — I10 ESSENTIAL (PRIMARY) HYPERTENSION: Primary | ICD-10-CM

## 2024-01-16 DIAGNOSIS — E11.69 HYPERLIPIDEMIA ASSOCIATED WITH TYPE 2 DIABETES MELLITUS (HCC): ICD-10-CM

## 2024-01-16 DIAGNOSIS — G61.81 CIDP (CHRONIC INFLAMMATORY DEMYELINATING POLYNEUROPATHY) (HCC): ICD-10-CM

## 2024-01-16 DIAGNOSIS — D69.6 THROMBOCYTOPENIA, UNSPECIFIED (HCC): ICD-10-CM

## 2024-01-16 DIAGNOSIS — R79.1 SUBTHERAPEUTIC INTERNATIONAL NORMALIZED RATIO (INR): ICD-10-CM

## 2024-01-16 DIAGNOSIS — F39 UNSPECIFIED MOOD (AFFECTIVE) DISORDER (HCC): ICD-10-CM

## 2024-01-16 DIAGNOSIS — E78.5 HYPERLIPIDEMIA ASSOCIATED WITH TYPE 2 DIABETES MELLITUS (HCC): ICD-10-CM

## 2024-01-16 DIAGNOSIS — Z86.718 PERSONAL HISTORY OF THROMBOEMBOLIC DISEASE: ICD-10-CM

## 2024-01-16 PROBLEM — R00.1 BRADYCARDIA: Status: RESOLVED | Noted: 2021-06-11 | Resolved: 2024-01-16

## 2024-01-16 PROBLEM — Z51.5 HOSPICE CARE PATIENT: Status: RESOLVED | Noted: 2020-03-30 | Resolved: 2024-01-16

## 2024-01-16 RX ORDER — TRAMADOL HYDROCHLORIDE 50 MG/1
50 TABLET ORAL EVERY 12 HOURS PRN
Qty: 60 TABLET | Refills: 0 | Status: SHIPPED | OUTPATIENT
Start: 2024-01-17 | End: 2024-02-16

## 2024-01-16 NOTE — PROGRESS NOTES
1/16/2024    Chief Complaint   Patient presents with    Follow-up Chronic Condition       Assessment and plan     1. Essential (primary) hypertension  2. Personal history of thromboembolic disease  -     warfarin (COUMADIN) 7.5 MG tablet; Take Coumadin 7.5 mg once a day on Mondays ,Wednesdays, Thursday, Fridays , and Sunday, Disp-90 tablet, R-1NO PRINT  -     warfarin (COUMADIN) 5 MG tablet; Take one tablet in the PM On Tuesdays,  and Saturdays, Disp-90 tablet, R-1NO PRINT  3. Severe obesity (BMI 35.0-39.9) with comorbidity ([facility])  Comments:  Dietary changes recommended  4. CIDP (chronic inflammatory demyelinating polyneuropathy) ([facility])  Comments:  At baseline  5. Unspecified mood (affective) disorder ([facility])  6. Hyperlipidemia associated with type 2 diabetes mellitus ([facility])  7. Thrombocytopenia, unspecified ([facility])  Comments:  Stable. asymptomatic for bleeding  8. Type 2 diabetes mellitus with hyperglycemia, without long-term current use of insulin ([facility])  -     CBC with Auto Differential; Future  -     Comprehensive Metabolic Panel; Future  -     Hemoglobin A1C; Future  -     Lipid Panel; Future  -     Urinalysis; Future  -     Microalbumin / Creatinine Urine Ratio; Future  9. Subtherapeutic international normalized ratio (INR)  -     warfarin (COUMADIN) 7.5 MG tablet; Take Coumadin 7.5 mg once a day on Mondays ,Wednesdays, Thursday, Fridays , and Sunday, Disp-90 tablet, R-1NO PRINT  -     warfarin (COUMADIN) 5 MG tablet; Take one tablet in the PM On Tuesdays,  and Saturdays, Disp-90 tablet, R-1NO PRINT     Patient is stable in the office today.   Test results discussed with patient and his daughter today.   asymptomatic for congestive heart failure.  Diabetes mellitus hemoglobin A1c 8.5.  No changes made in his meds today.  Goal is to keep his A1c less than 8.  He will continue to make efforts with his diet.  Blood pressure is good in the office today.  Leg swelling -mild edema.  No changes in his medications today.  His

## 2024-01-19 RX ORDER — WARFARIN SODIUM 7.5 MG/1
TABLET ORAL
Qty: 90 TABLET | Refills: 1
Start: 2024-01-19

## 2024-01-19 RX ORDER — WARFARIN SODIUM 5 MG/1
TABLET ORAL
Qty: 90 TABLET | Refills: 1
Start: 2024-01-19

## 2024-01-31 ENCOUNTER — PATIENT MESSAGE (OUTPATIENT)
Dept: INTERNAL MEDICINE CLINIC | Facility: CLINIC | Age: 84
End: 2024-01-31

## 2024-01-31 DIAGNOSIS — N40.0 ENLARGED PROSTATE: ICD-10-CM

## 2024-01-31 RX ORDER — TAMSULOSIN HYDROCHLORIDE 0.4 MG/1
0.4 CAPSULE ORAL NIGHTLY
Qty: 90 CAPSULE | Refills: 1 | Status: SHIPPED | OUTPATIENT
Start: 2024-01-31

## 2024-01-31 NOTE — TELEPHONE ENCOUNTER
From: Jermaine Bacon  To: Dr. Tisha Paniagua  Sent: 1/31/2024 12:41 PM EST  Subject: Refills    Hello   Need new Rx 90 day supply of Tamsulosin 0.4mg sent to [facility]     My pills run out 2/9. No refills on it.     Thank you   Jermaine

## 2024-02-16 DIAGNOSIS — M54.50 MIDLINE LOW BACK PAIN WITHOUT SCIATICA, UNSPECIFIED CHRONICITY: ICD-10-CM

## 2024-02-16 RX ORDER — TRAMADOL HYDROCHLORIDE 50 MG/1
50 TABLET ORAL EVERY 12 HOURS PRN
Qty: 60 TABLET | Refills: 0 | Status: CANCELLED | OUTPATIENT
Start: 2024-02-16 | End: 2024-03-17

## 2024-02-19 ENCOUNTER — TELEPHONE (OUTPATIENT)
Dept: FAMILY MEDICINE CLINIC | Facility: CLINIC | Age: 84
End: 2024-02-19

## 2024-02-19 DIAGNOSIS — M54.50 MIDLINE LOW BACK PAIN WITHOUT SCIATICA, UNSPECIFIED CHRONICITY: ICD-10-CM

## 2024-02-19 RX ORDER — TRAMADOL HYDROCHLORIDE 50 MG/1
50 TABLET ORAL EVERY 12 HOURS PRN
Qty: 60 TABLET | Refills: 0 | Status: SHIPPED | OUTPATIENT
Start: 2024-02-19 | End: 2024-03-20

## 2024-02-19 NOTE — TELEPHONE ENCOUNTER
----- Message from Jermaine Bacon sent at 2/19/2024  8:50 AM EST -----  Regarding: Monthly refill  Contact: [phone]  It time: Tramadol refill time [facility] [address]   Thanks   Jermaine

## 2024-03-01 ENCOUNTER — TELEPHONE (OUTPATIENT)
Dept: SLEEP MEDICINE | Age: 84
End: 2024-03-01

## 2024-03-01 NOTE — TELEPHONE ENCOUNTER
Contact pt. They wanted to cancel appointment . I told them , they really need to reschedule because insurance will not pay for supplies until patient is seen for compliance since he is a new start to cpap. The daughter stated his pcp does everything for him. I tried to explain to her this is for sleep to check him off on compliance . She will try and give us a call back .

## 2024-03-11 ENCOUNTER — PATIENT MESSAGE (OUTPATIENT)
Dept: INTERNAL MEDICINE CLINIC | Facility: CLINIC | Age: 84
End: 2024-03-11

## 2024-03-11 DIAGNOSIS — M54.50 MIDLINE LOW BACK PAIN WITHOUT SCIATICA, UNSPECIFIED CHRONICITY: ICD-10-CM

## 2024-03-13 DIAGNOSIS — M54.50 MIDLINE LOW BACK PAIN WITHOUT SCIATICA, UNSPECIFIED CHRONICITY: ICD-10-CM

## 2024-03-13 NOTE — TELEPHONE ENCOUNTER
----- Message from Jermaine Bacon sent at 3/11/2024  1:13 PM EDT -----  Regarding: Refil time  Contact: [phone]  Sanjeev   It’s almost time to refill Tramadol Rx. [facility] [address] Edie will be out of town so my other daughter will be here to pick it up.   Thank you  Jermaine

## 2024-03-14 NOTE — TELEPHONE ENCOUNTER
I have reviewed the patient’s controlled substance prescription history, as maintained in the [place] prescription monitoring program, so that the prescription(s) for a  controlled substance can be given.      Refill due about 3/20/24

## 2024-03-18 RX ORDER — TRAMADOL HYDROCHLORIDE 50 MG/1
50 TABLET ORAL EVERY 12 HOURS PRN
Qty: 60 TABLET | Refills: 0 | Status: SHIPPED | OUTPATIENT
Start: 2024-03-20 | End: 2024-04-19

## 2024-04-22 NOTE — TELEPHONE ENCOUNTER
From: Jermaine Bacon  Sent: 4/20/2024 11:08 AM EDT  To: [facility] Clinical Staff  Subject: Refil time     time to refill Tramadol Rx. [facility] [address]  Thank you

## 2024-04-22 NOTE — TELEPHONE ENCOUNTER
Seen last 1/16/24. Next appointment none scheduled currently. To return around 6/16/24 per notes.   Rx pended.

## 2024-04-24 ENCOUNTER — TELEPHONE (OUTPATIENT)
Dept: INTERNAL MEDICINE CLINIC | Facility: CLINIC | Age: 84
End: 2024-04-24

## 2024-04-24 RX ORDER — TRAMADOL HYDROCHLORIDE 50 MG/1
50 TABLET ORAL EVERY 12 HOURS PRN
Qty: 60 TABLET | Refills: 0 | Status: SHIPPED | OUTPATIENT
Start: 2024-04-24 | End: 2024-05-24

## 2024-04-24 NOTE — TELEPHONE ENCOUNTER
Refill request on:   Tramadol 50 mg - Take 1 tablet by mouth every 12 hours as needed  LOV- 1/16/24    Patient is completely out of medication, Pts daughter will like to know if there is any other medication that he can take instead of tramadol; a medication that can be sent in for a  3 months supply.   Please advise  Thank you!       As per  - pt can be refer to pain management to address pain medication options.   Patients daughter was informed, she states tramadol works great.     RX sent   Patients daughter was informed

## 2024-04-24 NOTE — TELEPHONE ENCOUNTER
----- Message from Juliet Euceda sent at 4/23/2024  1:54 PM EDT -----  Subject: Message to Provider    QUESTIONS  Information for Provider? Patients daughter states that she needs to have   the office call her regarding the medication. States she has called   several times and has been put on hold for 10 mins or more.  ---------------------------------------------------------------------------  --------------  CALL BACK INFO  [phone]; OK to leave message on voicemail  ---------------------------------------------------------------------------  --------------  SCRIPT ANSWERS  Relationship to Patient? Other/Third Party  Representative Name? Rosanne  Is the representative on the Communication Release of Information (GINNY)   form in Epic? Yes

## 2024-04-25 DIAGNOSIS — E11.8 CONTROLLED TYPE 2 DIABETES MELLITUS WITH COMPLICATION, WITHOUT LONG-TERM CURRENT USE OF INSULIN (HCC): ICD-10-CM

## 2024-04-26 RX ORDER — GLUCOSAMINE HCL/CHONDROITIN SU 500-400 MG
CAPSULE ORAL
Qty: 100 STRIP | Refills: 11 | Status: SHIPPED | OUTPATIENT
Start: 2024-04-26

## 2024-05-16 DIAGNOSIS — M54.50 MIDLINE LOW BACK PAIN WITHOUT SCIATICA, UNSPECIFIED CHRONICITY: ICD-10-CM

## 2024-05-16 NOTE — TELEPHONE ENCOUNTER
LOV: 1/16/24  No appointments scheduled at time of encounter. To return around 6/16/24 for chronic visit followup, fasting labs 1 week prior.    Rx pended.

## 2024-05-19 ENCOUNTER — TELEPHONE (OUTPATIENT)
Dept: INTERNAL MEDICINE CLINIC | Facility: CLINIC | Age: 84
End: 2024-05-19

## 2024-05-19 RX ORDER — TRAMADOL HYDROCHLORIDE 50 MG/1
50 TABLET ORAL EVERY 12 HOURS PRN
Qty: 60 TABLET | Refills: 0 | Status: SHIPPED | OUTPATIENT
Start: 2024-04-24 | End: 2024-05-24

## 2024-05-19 NOTE — TELEPHONE ENCOUNTER
Please call this patient and give her a follow-up appointment with me in June or Jul 2024.  This is an extended outpatient visit with fasting labs 1 week before the appointment

## 2024-06-10 ENCOUNTER — NURSE ONLY (OUTPATIENT)
Dept: INTERNAL MEDICINE CLINIC | Facility: CLINIC | Age: 84
End: 2024-06-10

## 2024-06-10 DIAGNOSIS — E11.65 TYPE 2 DIABETES MELLITUS WITH HYPERGLYCEMIA, WITHOUT LONG-TERM CURRENT USE OF INSULIN (HCC): ICD-10-CM

## 2024-06-10 LAB
ALBUMIN SERPL-MCNC: 3.7 G/DL (ref 3.2–4.6)
ALBUMIN/GLOB SERPL: 1.6 (ref 1–1.9)
ALP SERPL-CCNC: 57 U/L (ref 40–129)
ALT SERPL-CCNC: 27 U/L (ref 12–65)
ANION GAP SERPL CALC-SCNC: 13 MMOL/L (ref 9–18)
APPEARANCE UR: CLEAR
AST SERPL-CCNC: 22 U/L (ref 15–37)
BASOPHILS # BLD: 0 K/UL (ref 0–0.2)
BASOPHILS NFR BLD: 1 % (ref 0–2)
BILIRUB SERPL-MCNC: 0.8 MG/DL (ref 0–1.2)
BILIRUB UR QL: NEGATIVE
BUN SERPL-MCNC: 17 MG/DL (ref 8–23)
CALCIUM SERPL-MCNC: 9.2 MG/DL (ref 8.8–10.2)
CHLORIDE SERPL-SCNC: 103 MMOL/L (ref 98–107)
CHOLEST SERPL-MCNC: 171 MG/DL (ref 0–200)
CO2 SERPL-SCNC: 28 MMOL/L (ref 20–28)
COLOR UR: NORMAL
CREAT SERPL-MCNC: 1.05 MG/DL (ref 0.8–1.3)
CREAT UR-MCNC: 96.2 MG/DL (ref 39–259)
DIFFERENTIAL METHOD BLD: ABNORMAL
EOSINOPHIL # BLD: 0.2 K/UL (ref 0–0.8)
EOSINOPHIL NFR BLD: 5 % (ref 0.5–7.8)
ERYTHROCYTE [DISTWIDTH] IN BLOOD BY AUTOMATED COUNT: 13.8 % (ref 11.9–14.6)
EST. AVERAGE GLUCOSE BLD GHB EST-MCNC: 232 MG/DL
GLOBULIN SER CALC-MCNC: 2.3 G/DL (ref 2.3–3.5)
GLUCOSE SERPL-MCNC: 234 MG/DL (ref 70–99)
GLUCOSE UR STRIP.AUTO-MCNC: NEGATIVE MG/DL
HBA1C MFR BLD: 9.7 % (ref 0–5.6)
HCT VFR BLD AUTO: 44.4 % (ref 41.1–50.3)
HDLC SERPL-MCNC: 42 MG/DL (ref 40–60)
HDLC SERPL: 4.1 (ref 0–5)
HGB BLD-MCNC: 15.2 G/DL (ref 13.6–17.2)
HGB UR QL STRIP: NEGATIVE
IMM GRANULOCYTES # BLD AUTO: 0 K/UL (ref 0–0.5)
IMM GRANULOCYTES NFR BLD AUTO: 0 % (ref 0–5)
KETONES UR QL STRIP.AUTO: NEGATIVE MG/DL
LDLC SERPL CALC-MCNC: 69 MG/DL (ref 0–100)
LEUKOCYTE ESTERASE UR QL STRIP.AUTO: NEGATIVE
LYMPHOCYTES # BLD: 1.4 K/UL (ref 0.5–4.6)
LYMPHOCYTES NFR BLD: 27 % (ref 13–44)
MCH RBC QN AUTO: 29.5 PG (ref 26.1–32.9)
MCHC RBC AUTO-ENTMCNC: 34.2 G/DL (ref 31.4–35)
MCV RBC AUTO: 86 FL (ref 82–102)
MICROALBUMIN UR-MCNC: <1.2 MG/DL (ref 0–20)
MICROALBUMIN/CREAT UR-RTO: NORMAL MG/G (ref 0–30)
MONOCYTES # BLD: 0.3 K/UL (ref 0.1–1.3)
MONOCYTES NFR BLD: 6 % (ref 4–12)
NEUTS SEG # BLD: 3.2 K/UL (ref 1.7–8.2)
NEUTS SEG NFR BLD: 61 % (ref 43–78)
NITRITE UR QL STRIP.AUTO: NEGATIVE
NRBC # BLD: 0 K/UL (ref 0–0.2)
PH UR STRIP: 5.5 (ref 5–9)
PLATELET # BLD AUTO: 108 K/UL (ref 150–450)
PMV BLD AUTO: 10.7 FL (ref 9.4–12.3)
POTASSIUM SERPL-SCNC: 3.6 MMOL/L (ref 3.5–5.1)
PROT SERPL-MCNC: 6 G/DL (ref 6.3–8.2)
PROT UR STRIP-MCNC: NEGATIVE MG/DL
RBC # BLD AUTO: 5.16 M/UL (ref 4.23–5.6)
SODIUM SERPL-SCNC: 143 MMOL/L (ref 136–145)
SP GR UR REFRACTOMETRY: 1.01 (ref 1–1.02)
TRIGL SERPL-MCNC: 300 MG/DL (ref 0–150)
UROBILINOGEN UR QL STRIP.AUTO: 0.2 EU/DL (ref 0.2–1)
VLDLC SERPL CALC-MCNC: 60 MG/DL (ref 6–23)
WBC # BLD AUTO: 5.2 K/UL (ref 4.3–11.1)

## 2024-06-17 ENCOUNTER — TELEPHONE (OUTPATIENT)
Dept: FAMILY MEDICINE CLINIC | Facility: CLINIC | Age: 84
End: 2024-06-17

## 2024-06-17 ENCOUNTER — OFFICE VISIT (OUTPATIENT)
Dept: INTERNAL MEDICINE CLINIC | Facility: CLINIC | Age: 84
End: 2024-06-17
Payer: MEDICARE

## 2024-06-17 VITALS
SYSTOLIC BLOOD PRESSURE: 125 MMHG | BODY MASS INDEX: 34.86 KG/M2 | WEIGHT: 249 LBS | HEART RATE: 69 BPM | DIASTOLIC BLOOD PRESSURE: 73 MMHG | HEIGHT: 71 IN

## 2024-06-17 DIAGNOSIS — Z86.718 PERSONAL HISTORY OF THROMBOEMBOLIC DISEASE: ICD-10-CM

## 2024-06-17 DIAGNOSIS — M54.50 MIDLINE LOW BACK PAIN WITHOUT SCIATICA, UNSPECIFIED CHRONICITY: Primary | ICD-10-CM

## 2024-06-17 DIAGNOSIS — G61.81 CIDP (CHRONIC INFLAMMATORY DEMYELINATING POLYNEUROPATHY) (HCC): ICD-10-CM

## 2024-06-17 DIAGNOSIS — E11.65 UNCONTROLLED TYPE 2 DIABETES MELLITUS WITH HYPERGLYCEMIA (HCC): ICD-10-CM

## 2024-06-17 DIAGNOSIS — N40.0 ENLARGED PROSTATE: ICD-10-CM

## 2024-06-17 DIAGNOSIS — E66.01 SEVERE OBESITY (BMI 35.0-39.9) WITH COMORBIDITY (HCC): ICD-10-CM

## 2024-06-17 DIAGNOSIS — I10 ESSENTIAL (PRIMARY) HYPERTENSION: ICD-10-CM

## 2024-06-17 DIAGNOSIS — R60.0 BILATERAL EDEMA OF LOWER EXTREMITY: ICD-10-CM

## 2024-06-17 DIAGNOSIS — E11.69 HYPERLIPIDEMIA ASSOCIATED WITH TYPE 2 DIABETES MELLITUS (HCC): ICD-10-CM

## 2024-06-17 DIAGNOSIS — Z79.01 LONG TERM CURRENT USE OF ANTICOAGULANT: ICD-10-CM

## 2024-06-17 DIAGNOSIS — K21.9 GASTROESOPHAGEAL REFLUX DISEASE WITHOUT ESOPHAGITIS: ICD-10-CM

## 2024-06-17 DIAGNOSIS — E78.5 HYPERLIPIDEMIA ASSOCIATED WITH TYPE 2 DIABETES MELLITUS (HCC): ICD-10-CM

## 2024-06-17 DIAGNOSIS — Z91.81 AT HIGH RISK FOR FALLS: ICD-10-CM

## 2024-06-17 DIAGNOSIS — D69.6 THROMBOCYTOPENIA, UNSPECIFIED (HCC): ICD-10-CM

## 2024-06-17 DIAGNOSIS — Z79.891 LONG TERM (CURRENT) USE OF OPIATE ANALGESIC: ICD-10-CM

## 2024-06-17 PROCEDURE — 3074F SYST BP LT 130 MM HG: CPT | Performed by: INTERNAL MEDICINE

## 2024-06-17 PROCEDURE — 1036F TOBACCO NON-USER: CPT | Performed by: INTERNAL MEDICINE

## 2024-06-17 PROCEDURE — G8427 DOCREV CUR MEDS BY ELIG CLIN: HCPCS | Performed by: INTERNAL MEDICINE

## 2024-06-17 PROCEDURE — 3078F DIAST BP <80 MM HG: CPT | Performed by: INTERNAL MEDICINE

## 2024-06-17 PROCEDURE — 1123F ACP DISCUSS/DSCN MKR DOCD: CPT | Performed by: INTERNAL MEDICINE

## 2024-06-17 PROCEDURE — G8417 CALC BMI ABV UP PARAM F/U: HCPCS | Performed by: INTERNAL MEDICINE

## 2024-06-17 PROCEDURE — 99215 OFFICE O/P EST HI 40 MIN: CPT | Performed by: INTERNAL MEDICINE

## 2024-06-17 PROCEDURE — 3046F HEMOGLOBIN A1C LEVEL >9.0%: CPT | Performed by: INTERNAL MEDICINE

## 2024-06-17 RX ORDER — TRAMADOL HYDROCHLORIDE 50 MG/1
50 TABLET ORAL EVERY 12 HOURS PRN
COMMUNITY
Start: 2024-05-25 | End: 2024-06-19 | Stop reason: SDUPTHER

## 2024-06-17 RX ORDER — POTASSIUM CHLORIDE 750 MG/1
10 TABLET, EXTENDED RELEASE ORAL DAILY
Qty: 90 TABLET | Refills: 2 | Status: CANCELLED | OUTPATIENT
Start: 2024-06-17

## 2024-06-17 RX ORDER — FUROSEMIDE 20 MG/1
20 TABLET ORAL DAILY
Qty: 90 TABLET | Refills: 3 | Status: CANCELLED | OUTPATIENT
Start: 2024-06-17

## 2024-06-17 RX ORDER — TRAMADOL HYDROCHLORIDE 50 MG/1
50 TABLET ORAL EVERY 12 HOURS PRN
Status: CANCELLED | OUTPATIENT
Start: 2024-06-17

## 2024-06-17 RX ORDER — TORSEMIDE 20 MG/1
20 TABLET ORAL DAILY
COMMUNITY
Start: 2024-04-12

## 2024-06-17 ASSESSMENT — PATIENT HEALTH QUESTIONNAIRE - PHQ9
1. LITTLE INTEREST OR PLEASURE IN DOING THINGS: NOT AT ALL
SUM OF ALL RESPONSES TO PHQ QUESTIONS 1-9: 0
SUM OF ALL RESPONSES TO PHQ9 QUESTIONS 1 & 2: 0
SUM OF ALL RESPONSES TO PHQ QUESTIONS 1-9: 0
2. FEELING DOWN, DEPRESSED OR HOPELESS: NOT AT ALL

## 2024-06-17 NOTE — TELEPHONE ENCOUNTER
Pts daughter called to follow up on the medicaton Levemir that was discussed at today's visit. She stated the Levemir is not covered by his insurance but Toujeo and Tresiba are both on his formulary.

## 2024-06-17 NOTE — PROGRESS NOTES
6/17/2024    Chief Complaint   Patient presents with    Follow-up       Assessment and plan     1. Midline low back pain without sciatica, unspecified chronicity  2. Severe obesity (BMI 35.0-39.9) with comorbidity ([facility])  3. CIDP (chronic inflammatory demyelinating polyneuropathy) ([facility])  4. Hyperlipidemia associated with type 2 diabetes mellitus ([facility])  -     pravastatin (PRAVACHOL) 80 MG tablet; Take 1 tablet by mouth at bedtime, Disp-90 tablet, R-3Normal  5. Thrombocytopenia, unspecified ([facility])  6. Essential (primary) hypertension  7. Long term (current) use of opiate analgesic  8. Long term current use of anticoagulant  9. Personal history of thromboembolic disease  -     warfarin (COUMADIN) 7.5 MG tablet; Take Coumadin 7.5 mg once a day on Mondays ,Wednesdays, Thursday, Fridays , and Sunday, Disp-90 tablet, R-1Normal  -     warfarin (COUMADIN) 5 MG tablet; Take one tablet in the PM On Tuesdays,  and Saturdays, Disp-90 tablet, R-1Normal  10. At high risk for falls  11. Enlarged prostate  -     tamsulosin (FLOMAX) 0.4 MG capsule; Take 1 capsule by mouth at bedtime, Disp-90 capsule, R-1Normal  12. Bilateral edema of lower extremity  13. Uncontrolled type 2 diabetes mellitus with hyperglycemia ([facility])  Comments:  Hemoglobin A1c less than 8.  Orders:  -     metFORMIN (GLUCOPHAGE-XR) 500 MG extended release tablet; Take 1 tablet in the morning and 1 in the evening, Disp-360 tablet, R-2Normal  14. Gastroesophageal reflux disease without esophagitis  -     famotidine (PEPCID) 20 MG tablet; Take 1 tablet by mouth 2 times daily, Disp-180 tablet, R-3Normal     Patient seems to be doing well.  Test results discussed today diabetes is uncontrolled.  Prescription management:  Medications refilled as requested.  For refill of tramadol.    We had a long discussion regarding the diarrhea.  I recommended that he could use Lomotil as prescribed for diarrhea.  Strongly suggested that he work towards stopping metformin and replacing it

## 2024-06-19 ENCOUNTER — TELEPHONE (OUTPATIENT)
Dept: INTERNAL MEDICINE CLINIC | Facility: CLINIC | Age: 84
End: 2024-06-19

## 2024-06-19 DIAGNOSIS — M54.50 CHRONIC MIDLINE LOW BACK PAIN, UNSPECIFIED WHETHER SCIATICA PRESENT: Primary | ICD-10-CM

## 2024-06-19 DIAGNOSIS — E11.65 UNCONTROLLED TYPE 2 DIABETES MELLITUS WITH HYPERGLYCEMIA (HCC): Primary | ICD-10-CM

## 2024-06-19 DIAGNOSIS — G89.29 CHRONIC MIDLINE LOW BACK PAIN, UNSPECIFIED WHETHER SCIATICA PRESENT: Primary | ICD-10-CM

## 2024-06-19 RX ORDER — WARFARIN SODIUM 7.5 MG/1
TABLET ORAL
Qty: 90 TABLET | Refills: 1 | Status: SHIPPED | OUTPATIENT
Start: 2024-06-19

## 2024-06-19 RX ORDER — METFORMIN HYDROCHLORIDE 500 MG/1
TABLET, EXTENDED RELEASE ORAL
Qty: 360 TABLET | Refills: 2 | Status: SHIPPED | OUTPATIENT
Start: 2024-06-19

## 2024-06-19 RX ORDER — PRAVASTATIN SODIUM 80 MG/1
80 TABLET ORAL NIGHTLY
Qty: 90 TABLET | Refills: 3 | Status: SHIPPED | OUTPATIENT
Start: 2024-06-19

## 2024-06-19 RX ORDER — TRAMADOL HYDROCHLORIDE 50 MG/1
50 TABLET ORAL EVERY 12 HOURS PRN
Qty: 30 TABLET | Refills: 0 | Status: SHIPPED | OUTPATIENT
Start: 2024-06-23 | End: 2024-07-23

## 2024-06-19 RX ORDER — FAMOTIDINE 20 MG/1
20 TABLET, FILM COATED ORAL 2 TIMES DAILY
Qty: 180 TABLET | Refills: 3 | Status: SHIPPED | OUTPATIENT
Start: 2024-06-19

## 2024-06-19 RX ORDER — TAMSULOSIN HYDROCHLORIDE 0.4 MG/1
0.4 CAPSULE ORAL NIGHTLY
Qty: 90 CAPSULE | Refills: 1 | Status: SHIPPED | OUTPATIENT
Start: 2024-06-19

## 2024-06-19 RX ORDER — WARFARIN SODIUM 5 MG/1
TABLET ORAL
Qty: 90 TABLET | Refills: 1 | Status: SHIPPED | OUTPATIENT
Start: 2024-06-19

## 2024-06-19 NOTE — TELEPHONE ENCOUNTER
Called patients daughter and informed her of Tresiba being sent to pharmacy. States he is still using metformin as ordered until insulin is received. States he has an appointment Monday and wants to do the teaching at that time. In the mean time she will consult with pharmacist on how to use the medication until she sees our office.

## 2024-06-19 NOTE — TELEPHONE ENCOUNTER
Please call patient's daughter.   I have sent a prescription for Tresiba 10 units to the pharmacy .   She will need to be taught how to administer Insulin - Please give her/them an appointment ( It may be virtual) to educate them on insulin administration.     Until starting insulin, he would need to use original dose of Metformin.

## 2024-06-19 NOTE — TELEPHONE ENCOUNTER
Patient asked for refill of tramadol last visit earlier this week.  I have reviewed the patient’s controlled substance prescription history, as maintained in the [place] prescription monitoring program, so that the prescription(s) for a  controlled substance can be given.

## 2024-06-19 NOTE — TELEPHONE ENCOUNTER
Jackie from [facility] called, asks for clarification on metformin 500 mg Rx. States pt was taking 3 times a day, but Rx says 1 in morning and 1 in evening. Qty matches his old dosing.

## 2024-06-24 NOTE — TELEPHONE ENCOUNTER
Returned call to [facility] pharmacy spoke with Johnathon. Informed of message received about clarification on patient's metformin. Informed him that the Rx if directed as 1 tablet in the morning and one in the evening. Johnathon voiced understanding and will dispense medications.

## 2024-06-30 ENCOUNTER — PATIENT MESSAGE (OUTPATIENT)
Dept: INTERNAL MEDICINE CLINIC | Facility: CLINIC | Age: 84
End: 2024-06-30

## 2024-06-30 DIAGNOSIS — M54.50 CHRONIC MIDLINE LOW BACK PAIN, UNSPECIFIED WHETHER SCIATICA PRESENT: ICD-10-CM

## 2024-06-30 DIAGNOSIS — G89.29 CHRONIC MIDLINE LOW BACK PAIN, UNSPECIFIED WHETHER SCIATICA PRESENT: ICD-10-CM

## 2024-07-02 RX ORDER — TRAMADOL HYDROCHLORIDE 50 MG/1
50 TABLET ORAL EVERY 12 HOURS PRN
Qty: 60 TABLET | Refills: 0 | Status: CANCELLED | OUTPATIENT
Start: 2024-07-02 | End: 2024-08-01

## 2024-07-02 NOTE — TELEPHONE ENCOUNTER
From: Jermaine Bacon  To: Dr. Tisha Paniagua  Sent: 6/30/2024 2:48 PM EDT  Subject: Rx mistake    Sanjeev   Jermaine Bacon RX for Tramadol 50mg BID was sent in to [facility] pharmacy with a 2 week supply (30) pills instead of 60.   He will be out on 7/12.   He normally gets 60 per month.     Thank you,   Edie

## 2024-07-03 ENCOUNTER — TELEPHONE (OUTPATIENT)
Dept: INTERNAL MEDICINE CLINIC | Facility: CLINIC | Age: 84
End: 2024-07-03

## 2024-07-03 DIAGNOSIS — E11.65 UNCONTROLLED TYPE 2 DIABETES MELLITUS WITH HYPERGLYCEMIA (HCC): ICD-10-CM

## 2024-07-03 NOTE — TELEPHONE ENCOUNTER
I called and spoke to his daughter with respect to his insulin dosing and blood sugar readings at home.  Recommend increasing Tresiba to 16 units once a day and continue metformin 500 mg in the morning and 500 mg in the evening.    Daughter mentioned that his last refill for tramadol was for 30 tablets which lasted 2 weeks.  He may need a refill by July 15.  She is advised to give us a call when he needs a refill of his medications.    Also mentioned that they will need pen refills with Tresiba when his staff is next refill.  
Regarding: Morning blood sugars  Contact: [phone]  ----- Message from Cinthia Clark LPN sent at 7/2/2024  8:41 AM EDT -----       ----- Message from Jermaine Bacon to Tisha Paniagua MD sent at 7/2/2024  7:50 AM -----   Good morning    Here are the morning FBS from the last week:  He is taking 500 mg of Metformin and 12u of Tresiba in the morning and 500m of Metformin in the evening.  6/26 202  6/27  203  6/28 230  6/29 178  6/30 247  7/1  215    Noon reading  6/26  172  6/27   213  6/28. 191  6/29 217  6/30  210    7 day average per meter = 210    Do you want to adjust the Tresiba dose again?   
Plan: Suspect ACD to deodorant \\nStart TAC BID x 2 weeks
Detail Level: Zone
Render In Strict Bullet Format?: No

## 2024-07-11 ENCOUNTER — TELEMEDICINE (OUTPATIENT)
Dept: INTERNAL MEDICINE CLINIC | Facility: CLINIC | Age: 84
End: 2024-07-11
Payer: MEDICARE

## 2024-07-11 DIAGNOSIS — M54.50 CHRONIC MIDLINE LOW BACK PAIN, UNSPECIFIED WHETHER SCIATICA PRESENT: ICD-10-CM

## 2024-07-11 DIAGNOSIS — G89.29 CHRONIC MIDLINE LOW BACK PAIN, UNSPECIFIED WHETHER SCIATICA PRESENT: ICD-10-CM

## 2024-07-11 DIAGNOSIS — E11.65 UNCONTROLLED TYPE 2 DIABETES MELLITUS WITH HYPERGLYCEMIA (HCC): ICD-10-CM

## 2024-07-11 DIAGNOSIS — N40.0 ENLARGED PROSTATE: ICD-10-CM

## 2024-07-11 DIAGNOSIS — E11.8 CONTROLLED TYPE 2 DIABETES MELLITUS WITH COMPLICATION, WITHOUT LONG-TERM CURRENT USE OF INSULIN (HCC): ICD-10-CM

## 2024-07-11 PROCEDURE — 1123F ACP DISCUSS/DSCN MKR DOCD: CPT | Performed by: INTERNAL MEDICINE

## 2024-07-11 PROCEDURE — G8427 DOCREV CUR MEDS BY ELIG CLIN: HCPCS | Performed by: INTERNAL MEDICINE

## 2024-07-11 PROCEDURE — 99214 OFFICE O/P EST MOD 30 MIN: CPT | Performed by: INTERNAL MEDICINE

## 2024-07-11 PROCEDURE — 3046F HEMOGLOBIN A1C LEVEL >9.0%: CPT | Performed by: INTERNAL MEDICINE

## 2024-07-11 RX ORDER — POTASSIUM CHLORIDE 750 MG/1
10 TABLET, FILM COATED, EXTENDED RELEASE ORAL DAILY
COMMUNITY
Start: 2024-04-30

## 2024-07-11 RX ORDER — PEN NEEDLE, DIABETIC 30 GX5/16"
1 NEEDLE, DISPOSABLE MISCELLANEOUS DAILY
Qty: 100 EACH | Refills: 3 | Status: SHIPPED | OUTPATIENT
Start: 2024-07-11

## 2024-07-11 RX ORDER — TRAMADOL HYDROCHLORIDE 50 MG/1
50 TABLET ORAL EVERY 12 HOURS PRN
Qty: 30 TABLET | Refills: 0 | Status: CANCELLED | OUTPATIENT
Start: 2024-07-11 | End: 2024-08-10

## 2024-07-11 RX ORDER — GLUCOSAMINE HCL/CHONDROITIN SU 500-400 MG
CAPSULE ORAL
Qty: 100 STRIP | Refills: 11 | Status: SHIPPED | OUTPATIENT
Start: 2024-07-11

## 2024-07-11 NOTE — PROGRESS NOTES
2024    Jermaine Bacon (: 1940) is a 83 y.o. male, Established patientpatient, here for evaluation of the following chief complaint(s):     Chief Complaint   Patient presents with    Follow-up     Insulin f/u, med changes and diarrhea f/u         Assessment & Plan:     1. Chronic midline low back pain, unspecified whether sciatica present  2. Enlarged prostate  3. Controlled type 2 diabetes mellitus with complication, without long-term current use of insulin ([facility])  4. Uncontrolled type 2 diabetes mellitus with hyperglycemia ([facility])  -     Insulin Pen Needle (PEN NEEDLES 3/16\") 31G X 5 MM MISC; DAILY Starting Thu 2024, Disp-100 each, R-3, NormalDiagnosis code: E11.65  -     blood glucose monitor strips; Use to test blood sugars once a day. E11.65, Disp-100 strip, R-11, Normal  -     Insulin Degludec 100 UNIT/ML SOPN; Take 20units once a day, Disp-5 Adjustable Dose Pre-filled Pen Syringe, R-1NO PRINT    Patient seems to be tolerating the insulin bowel movements seem to have improved with the lower dose of metformin.    Diabetes is still not controlled.  Dose of insulin increased to 20 units today.  I am permitting an increase in insulin by 2 units a day his blood sugars fasting less than 130 mg/dL MD to check back with them within the next week to follow-up on his blood sugars Given additional escalation if not at goal within the next 4 to 5 days    No change in metformin dose yet.  Will prefer to change once his blood sugars have improved    Tramadol refill request:  I have reviewed the patient’s controlled substance prescription history, as maintained in the [place] prescription monitoring program, so that the prescription(s) for a  controlled substance can be given.  Last refill was 2024.  Patient should still have medications to last at least another 12 days.  No refills sent at this time .         Return in about 4 weeks (around 2024) for Diabetes Mellitus, Virtual Visit

## 2024-07-15 ENCOUNTER — TELEPHONE (OUTPATIENT)
Dept: INTERNAL MEDICINE CLINIC | Facility: CLINIC | Age: 84
End: 2024-07-15

## 2024-07-15 DIAGNOSIS — G89.29 CHRONIC MIDLINE LOW BACK PAIN, UNSPECIFIED WHETHER SCIATICA PRESENT: ICD-10-CM

## 2024-07-15 DIAGNOSIS — Z79.4 UNCONTROLLED TYPE 2 DIABETES MELLITUS WITH HYPERGLYCEMIA, WITH LONG-TERM CURRENT USE OF INSULIN (HCC): Primary | ICD-10-CM

## 2024-07-15 DIAGNOSIS — M54.50 CHRONIC MIDLINE LOW BACK PAIN, UNSPECIFIED WHETHER SCIATICA PRESENT: ICD-10-CM

## 2024-07-15 DIAGNOSIS — E11.65 UNCONTROLLED TYPE 2 DIABETES MELLITUS WITH HYPERGLYCEMIA, WITH LONG-TERM CURRENT USE OF INSULIN (HCC): Primary | ICD-10-CM

## 2024-07-15 RX ORDER — TRAMADOL HYDROCHLORIDE 50 MG/1
50 TABLET ORAL EVERY 12 HOURS PRN
Qty: 60 TABLET | Refills: 0 | Status: SHIPPED | OUTPATIENT
Start: 2024-06-15 | End: 2024-07-15

## 2024-07-15 NOTE — TELEPHONE ENCOUNTER
I have reviewed the patient’s controlled substance prescription history, as maintained in the [place] prescription monitoring program, so that the prescription(s) for a  controlled substance can be given.  Tramadol refilled

## 2024-07-15 NOTE — TELEPHONE ENCOUNTER
Patient needs a refill on Tramadol. Patient stated they had requested the refill on the last VV on 7/11/2024. The pharmacy is stating that they do not have the script.      Script was sent into the pharmacy on 6/23/2024 for 30 tablets with 0 refills. Receipt was confirmed by the pharmacy on 6/19/2024.      Pharmacy: [facility].    Patient is completely out of medication. Patient ran out of medication as of yesterday.       The patient received only a 15 day supply in June. Patient stated that he takes the medication twice a day, if you could please send in 60 pills for a 30 day  supply.

## 2024-07-15 NOTE — TELEPHONE ENCOUNTER
Prescription request form received from [facility] pharmacy placed in red to be signed/reviewed folder for provider.

## 2024-07-17 RX ORDER — BLOOD SUGAR DIAGNOSTIC
1 STRIP MISCELLANEOUS DAILY
Qty: 300 EACH | Refills: 0 | Status: SHIPPED | OUTPATIENT
Start: 2024-07-17 | End: 2024-10-15

## 2024-07-17 NOTE — TELEPHONE ENCOUNTER
[facility] to send paperwork for test strips Accu-Chek Nina Plus.  I will send an updated prescription to the pharmacy.

## 2024-08-12 ENCOUNTER — TELEMEDICINE (OUTPATIENT)
Dept: INTERNAL MEDICINE CLINIC | Facility: CLINIC | Age: 84
End: 2024-08-12
Payer: MEDICARE

## 2024-08-12 DIAGNOSIS — Z86.718 PERSONAL HISTORY OF THROMBOEMBOLIC DISEASE: ICD-10-CM

## 2024-08-12 DIAGNOSIS — E11.65 UNCONTROLLED TYPE 2 DIABETES MELLITUS WITH HYPERGLYCEMIA (HCC): ICD-10-CM

## 2024-08-12 DIAGNOSIS — E11.65 UNCONTROLLED TYPE 2 DIABETES MELLITUS WITH HYPERGLYCEMIA, WITH LONG-TERM CURRENT USE OF INSULIN (HCC): ICD-10-CM

## 2024-08-12 DIAGNOSIS — Z79.4 UNCONTROLLED TYPE 2 DIABETES MELLITUS WITH HYPERGLYCEMIA, WITH LONG-TERM CURRENT USE OF INSULIN (HCC): ICD-10-CM

## 2024-08-12 PROCEDURE — G8427 DOCREV CUR MEDS BY ELIG CLIN: HCPCS | Performed by: INTERNAL MEDICINE

## 2024-08-12 PROCEDURE — 99213 OFFICE O/P EST LOW 20 MIN: CPT | Performed by: INTERNAL MEDICINE

## 2024-08-12 PROCEDURE — 1123F ACP DISCUSS/DSCN MKR DOCD: CPT | Performed by: INTERNAL MEDICINE

## 2024-08-12 PROCEDURE — 3046F HEMOGLOBIN A1C LEVEL >9.0%: CPT | Performed by: INTERNAL MEDICINE

## 2024-08-12 RX ORDER — WARFARIN SODIUM 5 MG/1
TABLET ORAL
Qty: 180 TABLET | Refills: 1 | Status: SHIPPED | OUTPATIENT
Start: 2024-08-12

## 2024-08-12 RX ORDER — METFORMIN HYDROCHLORIDE 500 MG/1
TABLET, EXTENDED RELEASE ORAL
Qty: 360 TABLET | Refills: 2 | Status: SHIPPED
Start: 2024-08-12

## 2024-08-12 RX ORDER — WARFARIN SODIUM 7.5 MG/1
TABLET ORAL
Qty: 180 TABLET | Refills: 1 | Status: SHIPPED | OUTPATIENT
Start: 2024-08-12

## 2024-08-14 ENCOUNTER — TELEPHONE (OUTPATIENT)
Dept: INTERNAL MEDICINE CLINIC | Facility: CLINIC | Age: 84
End: 2024-08-14

## 2024-08-18 ENCOUNTER — TELEPHONE (OUTPATIENT)
Dept: INTERNAL MEDICINE CLINIC | Facility: CLINIC | Age: 84
End: 2024-08-18

## 2024-08-18 DIAGNOSIS — E11.65 UNCONTROLLED TYPE 2 DIABETES MELLITUS WITH HYPERGLYCEMIA (HCC): ICD-10-CM

## 2024-09-09 ENCOUNTER — TELEMEDICINE (OUTPATIENT)
Dept: INTERNAL MEDICINE CLINIC | Facility: CLINIC | Age: 84
End: 2024-09-09
Payer: MEDICARE

## 2024-09-09 DIAGNOSIS — M54.50 CHRONIC MIDLINE LOW BACK PAIN, UNSPECIFIED WHETHER SCIATICA PRESENT: ICD-10-CM

## 2024-09-09 DIAGNOSIS — G89.29 CHRONIC MIDLINE LOW BACK PAIN, UNSPECIFIED WHETHER SCIATICA PRESENT: ICD-10-CM

## 2024-09-09 DIAGNOSIS — E11.65 UNCONTROLLED TYPE 2 DIABETES MELLITUS WITH HYPERGLYCEMIA (HCC): Primary | ICD-10-CM

## 2024-09-09 DIAGNOSIS — Z79.01 LONG TERM CURRENT USE OF ANTICOAGULANT: ICD-10-CM

## 2024-09-09 PROCEDURE — 3046F HEMOGLOBIN A1C LEVEL >9.0%: CPT | Performed by: INTERNAL MEDICINE

## 2024-09-09 PROCEDURE — G8427 DOCREV CUR MEDS BY ELIG CLIN: HCPCS | Performed by: INTERNAL MEDICINE

## 2024-09-09 PROCEDURE — 1123F ACP DISCUSS/DSCN MKR DOCD: CPT | Performed by: INTERNAL MEDICINE

## 2024-09-09 PROCEDURE — 99214 OFFICE O/P EST MOD 30 MIN: CPT | Performed by: INTERNAL MEDICINE

## 2024-09-09 RX ORDER — TRAMADOL HYDROCHLORIDE 50 MG/1
50 TABLET ORAL EVERY 12 HOURS PRN
Qty: 60 TABLET | Refills: 0 | Status: SHIPPED | OUTPATIENT
Start: 2024-09-13 | End: 2024-10-13

## 2024-09-16 ENCOUNTER — PATIENT MESSAGE (OUTPATIENT)
Dept: INTERNAL MEDICINE CLINIC | Facility: CLINIC | Age: 84
End: 2024-09-16

## 2024-09-16 DIAGNOSIS — E11.65 UNCONTROLLED TYPE 2 DIABETES MELLITUS WITH HYPERGLYCEMIA (HCC): ICD-10-CM

## 2024-10-08 DIAGNOSIS — E11.65 UNCONTROLLED TYPE 2 DIABETES MELLITUS WITH HYPERGLYCEMIA (HCC): ICD-10-CM

## 2024-10-08 LAB
ALBUMIN SERPL-MCNC: 3.6 G/DL (ref 3.2–4.6)
ALBUMIN/GLOB SERPL: 1.4 (ref 1–1.9)
ALP SERPL-CCNC: 66 U/L (ref 40–129)
ALT SERPL-CCNC: 26 U/L (ref 8–55)
ANION GAP SERPL CALC-SCNC: 11 MMOL/L (ref 9–18)
AST SERPL-CCNC: 23 U/L (ref 15–37)
BASOPHILS # BLD: 0 K/UL (ref 0–0.2)
BASOPHILS NFR BLD: 0 % (ref 0–2)
BILIRUB SERPL-MCNC: 0.7 MG/DL (ref 0–1.2)
BUN SERPL-MCNC: 27 MG/DL (ref 8–23)
CALCIUM SERPL-MCNC: 9.5 MG/DL (ref 8.8–10.2)
CHLORIDE SERPL-SCNC: 109 MMOL/L (ref 98–107)
CHOLEST SERPL-MCNC: 168 MG/DL (ref 0–200)
CO2 SERPL-SCNC: 27 MMOL/L (ref 20–28)
CREAT SERPL-MCNC: 1.41 MG/DL (ref 0.8–1.3)
DIFFERENTIAL METHOD BLD: ABNORMAL
EOSINOPHIL # BLD: 0.2 K/UL (ref 0–0.8)
EOSINOPHIL NFR BLD: 5 % (ref 0.5–7.8)
ERYTHROCYTE [DISTWIDTH] IN BLOOD BY AUTOMATED COUNT: 13.2 % (ref 11.9–14.6)
GLOBULIN SER CALC-MCNC: 2.6 G/DL (ref 2.3–3.5)
GLUCOSE SERPL-MCNC: 143 MG/DL (ref 70–99)
HCT VFR BLD AUTO: 46.7 % (ref 41.1–50.3)
HDLC SERPL-MCNC: 39 MG/DL (ref 40–60)
HDLC SERPL: 4.3 (ref 0–5)
HGB BLD-MCNC: 15.1 G/DL (ref 13.6–17.2)
IMM GRANULOCYTES # BLD AUTO: 0 K/UL (ref 0–0.5)
IMM GRANULOCYTES NFR BLD AUTO: 0 % (ref 0–5)
LDLC SERPL CALC-MCNC: 88 MG/DL (ref 0–100)
LYMPHOCYTES # BLD: 1.3 K/UL (ref 0.5–4.6)
LYMPHOCYTES NFR BLD: 23 % (ref 13–44)
MCH RBC QN AUTO: 29.3 PG (ref 26.1–32.9)
MCHC RBC AUTO-ENTMCNC: 32.3 G/DL (ref 31.4–35)
MCV RBC AUTO: 90.5 FL (ref 82–102)
MONOCYTES # BLD: 0.4 K/UL (ref 0.1–1.3)
MONOCYTES NFR BLD: 7 % (ref 4–12)
NEUTS SEG # BLD: 3.4 K/UL (ref 1.7–8.2)
NEUTS SEG NFR BLD: 64 % (ref 43–78)
NRBC # BLD: 0 K/UL (ref 0–0.2)
PLATELET # BLD AUTO: 94 K/UL (ref 150–450)
PMV BLD AUTO: 10.8 FL (ref 9.4–12.3)
POTASSIUM SERPL-SCNC: 4.5 MMOL/L (ref 3.5–5.1)
PROT SERPL-MCNC: 6.2 G/DL (ref 6.3–8.2)
RBC # BLD AUTO: 5.16 M/UL (ref 4.23–5.6)
SODIUM SERPL-SCNC: 147 MMOL/L (ref 136–145)
TRIGL SERPL-MCNC: 201 MG/DL (ref 0–150)
VLDLC SERPL CALC-MCNC: 40 MG/DL (ref 6–23)
WBC # BLD AUTO: 5.4 K/UL (ref 4.3–11.1)

## 2024-10-09 DIAGNOSIS — E11.65 UNCONTROLLED TYPE 2 DIABETES MELLITUS WITH HYPERGLYCEMIA (HCC): ICD-10-CM

## 2024-10-09 LAB
APPEARANCE UR: CLEAR
BACTERIA URNS QL MICRO: NEGATIVE /HPF
BILIRUB UR QL: NEGATIVE
CASTS URNS QL MICRO: 0 /LPF
COLOR UR: NORMAL
CRYSTALS URNS QL MICRO: 0 /LPF
EPI CELLS #/AREA URNS HPF: NORMAL /HPF (ref 0–5)
GLUCOSE UR STRIP.AUTO-MCNC: NEGATIVE MG/DL
HGB UR QL STRIP: NEGATIVE
HYALINE CASTS URNS QL MICRO: NORMAL /LPF
KETONES UR QL STRIP.AUTO: NEGATIVE MG/DL
LEUKOCYTE ESTERASE UR QL STRIP.AUTO: NEGATIVE
MUCOUS THREADS URNS QL MICRO: 0 /LPF
NITRITE UR QL STRIP.AUTO: NEGATIVE
PH UR STRIP: 5 (ref 5–9)
PROT UR STRIP-MCNC: NEGATIVE MG/DL
RBC #/AREA URNS HPF: NORMAL /HPF (ref 0–5)
SP GR UR REFRACTOMETRY: 1.01 (ref 1–1.02)
URINE CULTURE IF INDICATED: NORMAL
UROBILINOGEN UR QL STRIP.AUTO: 0.2 EU/DL (ref 0.2–1)
WBC URNS QL MICRO: NORMAL /HPF (ref 0–4)

## 2024-10-10 DIAGNOSIS — G89.29 CHRONIC MIDLINE LOW BACK PAIN, UNSPECIFIED WHETHER SCIATICA PRESENT: ICD-10-CM

## 2024-10-10 DIAGNOSIS — M54.50 CHRONIC MIDLINE LOW BACK PAIN, UNSPECIFIED WHETHER SCIATICA PRESENT: ICD-10-CM

## 2024-10-11 RX ORDER — TRAMADOL HYDROCHLORIDE 50 MG/1
50 TABLET ORAL EVERY 12 HOURS PRN
Qty: 60 TABLET | Refills: 0 | Status: SHIPPED | OUTPATIENT
Start: 2024-10-11 | End: 2024-11-10

## 2024-10-15 ENCOUNTER — OFFICE VISIT (OUTPATIENT)
Dept: INTERNAL MEDICINE CLINIC | Facility: CLINIC | Age: 84
End: 2024-10-15
Payer: MEDICARE

## 2024-10-15 VITALS
DIASTOLIC BLOOD PRESSURE: 58 MMHG | SYSTOLIC BLOOD PRESSURE: 118 MMHG | BODY MASS INDEX: 36.18 KG/M2 | WEIGHT: 258.4 LBS | HEIGHT: 71 IN

## 2024-10-15 DIAGNOSIS — I10 ESSENTIAL (PRIMARY) HYPERTENSION: ICD-10-CM

## 2024-10-15 DIAGNOSIS — G61.81 CIDP (CHRONIC INFLAMMATORY DEMYELINATING POLYNEUROPATHY) (HCC): ICD-10-CM

## 2024-10-15 DIAGNOSIS — E11.69 HYPERLIPIDEMIA ASSOCIATED WITH TYPE 2 DIABETES MELLITUS (HCC): ICD-10-CM

## 2024-10-15 DIAGNOSIS — Z79.01 LONG TERM CURRENT USE OF ANTICOAGULANT: ICD-10-CM

## 2024-10-15 DIAGNOSIS — Z86.718 PERSONAL HISTORY OF THROMBOEMBOLIC DISEASE: ICD-10-CM

## 2024-10-15 DIAGNOSIS — E11.65 UNCONTROLLED TYPE 2 DIABETES MELLITUS WITH HYPERGLYCEMIA, WITH LONG-TERM CURRENT USE OF INSULIN (HCC): Primary | ICD-10-CM

## 2024-10-15 DIAGNOSIS — E78.5 HYPERLIPIDEMIA ASSOCIATED WITH TYPE 2 DIABETES MELLITUS (HCC): ICD-10-CM

## 2024-10-15 DIAGNOSIS — Z79.4 UNCONTROLLED TYPE 2 DIABETES MELLITUS WITH HYPERGLYCEMIA, WITH LONG-TERM CURRENT USE OF INSULIN (HCC): Primary | ICD-10-CM

## 2024-10-15 DIAGNOSIS — D69.6 THROMBOCYTOPENIA, UNSPECIFIED (HCC): ICD-10-CM

## 2024-10-15 LAB
EST. AVERAGE GLUCOSE BLD GHB EST-MCNC: 169 MG/DL
HBA1C MFR BLD: 7.5 % (ref 0–5.6)

## 2024-10-15 PROCEDURE — G8417 CALC BMI ABV UP PARAM F/U: HCPCS | Performed by: INTERNAL MEDICINE

## 2024-10-15 PROCEDURE — 3051F HG A1C>EQUAL 7.0%<8.0%: CPT | Performed by: INTERNAL MEDICINE

## 2024-10-15 PROCEDURE — 3074F SYST BP LT 130 MM HG: CPT | Performed by: INTERNAL MEDICINE

## 2024-10-15 PROCEDURE — 3078F DIAST BP <80 MM HG: CPT | Performed by: INTERNAL MEDICINE

## 2024-10-15 PROCEDURE — G8484 FLU IMMUNIZE NO ADMIN: HCPCS | Performed by: INTERNAL MEDICINE

## 2024-10-15 PROCEDURE — 1123F ACP DISCUSS/DSCN MKR DOCD: CPT | Performed by: INTERNAL MEDICINE

## 2024-10-15 PROCEDURE — G8427 DOCREV CUR MEDS BY ELIG CLIN: HCPCS | Performed by: INTERNAL MEDICINE

## 2024-10-15 PROCEDURE — 99214 OFFICE O/P EST MOD 30 MIN: CPT | Performed by: INTERNAL MEDICINE

## 2024-10-15 PROCEDURE — 1036F TOBACCO NON-USER: CPT | Performed by: INTERNAL MEDICINE

## 2024-10-15 RX ORDER — BLOOD SUGAR DIAGNOSTIC
1 STRIP MISCELLANEOUS DAILY
Qty: 300 EACH | Refills: 0 | Status: SHIPPED | OUTPATIENT
Start: 2024-10-15 | End: 2025-01-13

## 2024-10-15 SDOH — ECONOMIC STABILITY: FOOD INSECURITY: WITHIN THE PAST 12 MONTHS, YOU WORRIED THAT YOUR FOOD WOULD RUN OUT BEFORE YOU GOT MONEY TO BUY MORE.: NEVER TRUE

## 2024-10-15 SDOH — ECONOMIC STABILITY: FOOD INSECURITY: WITHIN THE PAST 12 MONTHS, THE FOOD YOU BOUGHT JUST DIDN'T LAST AND YOU DIDN'T HAVE MONEY TO GET MORE.: NEVER TRUE

## 2024-10-15 SDOH — ECONOMIC STABILITY: INCOME INSECURITY: HOW HARD IS IT FOR YOU TO PAY FOR THE VERY BASICS LIKE FOOD, HOUSING, MEDICAL CARE, AND HEATING?: NOT HARD AT ALL

## 2024-10-15 NOTE — PROGRESS NOTES
10/15/2024    Chief Complaint   Patient presents with    Follow-up       Assessment and plan     1. Uncontrolled type 2 diabetes mellitus with hyperglycemia, with long-term current use of insulin ([facility])  -     blood glucose test strips (ACCU-CHEK LEELA PLUS) strip; 1 each by In Vitro route daily check blood sugar 3 times a day.  Diagnosis code: E11.65, Z79.4, Disp-300 each, R-0We received a fax regarding refilling his test strips.  There has been a change in his management and he is using insulin as well as his other diabetes medication.  Testing frequency increased to 3 times a day.Normal  -     Hemoglobin A1C; Future  -     empagliflozin (JARDIANCE) 10 MG tablet; Take 1 tablet by mouth daily [id number] EXP Jan 2026, Disp-21 tablet, R-0Sample  -     Insulin Degludec 100 UNIT/ML SOPN; Inject 50 Units into the skin Daily, Disp-5 Adjustable Dose Pre-filled Pen Syringe, R-3Normal  2. Essential (primary) hypertension  3. Hyperlipidemia associated with type 2 diabetes mellitus ([facility])  4. CIDP (chronic inflammatory demyelinating polyneuropathy) ([facility])  Comments:  Stable  5. Long term current use of anticoagulant  6. Personal history of thromboembolic disease  7. Thrombocytopenia, unspecified ([facility])  Comments:  Chronic . Asymptomatic       Patient seems to be doing well.  Diabetes mellitus: His blood sugars seem to have improved with the combination of the insulin and metformin.  We had set a soft goal of keeping his hemoglobin A1c at least less than 8.0.  Today we decided to stop metformin.  Will give him a trial of Jardiance 10 mg once a day.  His daughter has asked that the insulin be decreased to 50 units since making this change she is concerned that his blood sugars may go too low.  He has used Jardiance in the past and has had some minor concerns with using the medication.  Discussed side effects of the medication especially with respect to genital yeast infections/irritation.  I have also asked him to monitor his blood

## 2024-10-25 ENCOUNTER — TELEPHONE (OUTPATIENT)
Dept: INTERNAL MEDICINE CLINIC | Facility: CLINIC | Age: 84
End: 2024-10-25

## 2024-10-25 DIAGNOSIS — Z79.4 UNCONTROLLED TYPE 2 DIABETES MELLITUS WITH HYPERGLYCEMIA, WITH LONG-TERM CURRENT USE OF INSULIN (HCC): ICD-10-CM

## 2024-10-25 DIAGNOSIS — E11.65 UNCONTROLLED TYPE 2 DIABETES MELLITUS WITH HYPERGLYCEMIA, WITH LONG-TERM CURRENT USE OF INSULIN (HCC): ICD-10-CM

## 2024-10-25 NOTE — TELEPHONE ENCOUNTER
I have spoken to patient's daughter on 10/23/24 and we have had a few conversations on 10/23/24 , 10/24/24 and then again this morning with regards to his blood glucose and blod pressure.     Following the addition of Jardiance 10mg and stopping Metformin and decreasing Insulin, his fasting blood suagrs have decreased into the 90s and his blood pressure also decreased.     On 10/23/24 Jardiance was discontinued. Insuln was held and daughter was advised to monitor blood glucose, ensure he was eating . Insulin was decreased to 40 units, only to be used if his fasting BS on 10/24 was > 250mg/dl on 10/24/24 his blood sugar improved but did not rise to > 200mg/dl so Insulin was continued to be held. This morning, FBS was 134 mg/dl and /64mmHg. Insulin still held.  Spoke to daughter now: 2hr BS after lunch : 186mg/dl.     Plan : continue to hold insulin. Monitor at home . Call office for BS > 200 - 250 mg/dl.

## 2024-10-28 ENCOUNTER — TELEPHONE (OUTPATIENT)
Dept: INTERNAL MEDICINE CLINIC | Facility: CLINIC | Age: 84
End: 2024-10-28

## 2024-10-28 DIAGNOSIS — E11.65 UNCONTROLLED TYPE 2 DIABETES MELLITUS WITH HYPERGLYCEMIA, WITH LONG-TERM CURRENT USE OF INSULIN (HCC): ICD-10-CM

## 2024-10-28 DIAGNOSIS — Z79.4 UNCONTROLLED TYPE 2 DIABETES MELLITUS WITH HYPERGLYCEMIA, WITH LONG-TERM CURRENT USE OF INSULIN (HCC): ICD-10-CM

## 2024-10-28 NOTE — TELEPHONE ENCOUNTER
Called and spoke to patient's daughter today to follow-up on her blood sugar readings.    Sunday: Fasting blood sugar 148.  2 hours after breakfast 185.  2 hours after lunch 227.  2 hours after supper 253.    Today: Fasting blood sugar 143.  2 hours after breakfast 217.  2 hours after lunch 258    Recommend: Resuming Tresiba at 30 units tomorrow morning.    She also says he had 1 loose bowel movement yesterday.  He has some swelling in his legs today.    Plan: He is given an appointment to follow-up with me tomorrow morning for evaluation

## 2024-10-29 ENCOUNTER — OFFICE VISIT (OUTPATIENT)
Dept: INTERNAL MEDICINE CLINIC | Facility: CLINIC | Age: 84
End: 2024-10-29

## 2024-10-29 VITALS
BODY MASS INDEX: 35.98 KG/M2 | OXYGEN SATURATION: 94 % | WEIGHT: 257 LBS | HEART RATE: 35 BPM | HEIGHT: 71 IN | DIASTOLIC BLOOD PRESSURE: 52 MMHG | SYSTOLIC BLOOD PRESSURE: 117 MMHG

## 2024-10-29 DIAGNOSIS — R94.31 ABNORMAL EKG: ICD-10-CM

## 2024-10-29 DIAGNOSIS — Z91.81 AT HIGH RISK FOR FALLS: ICD-10-CM

## 2024-10-29 DIAGNOSIS — R11.0 NAUSEA: ICD-10-CM

## 2024-10-29 DIAGNOSIS — Z79.4 UNCONTROLLED TYPE 2 DIABETES MELLITUS WITH HYPERGLYCEMIA, WITH LONG-TERM CURRENT USE OF INSULIN (HCC): ICD-10-CM

## 2024-10-29 DIAGNOSIS — G61.81 CIDP (CHRONIC INFLAMMATORY DEMYELINATING POLYNEUROPATHY) (HCC): ICD-10-CM

## 2024-10-29 DIAGNOSIS — R35.0 URINARY FREQUENCY: ICD-10-CM

## 2024-10-29 DIAGNOSIS — I10 ESSENTIAL (PRIMARY) HYPERTENSION: Primary | ICD-10-CM

## 2024-10-29 DIAGNOSIS — M54.50 MIDLINE LOW BACK PAIN WITHOUT SCIATICA, UNSPECIFIED CHRONICITY: ICD-10-CM

## 2024-10-29 DIAGNOSIS — E11.65 UNCONTROLLED TYPE 2 DIABETES MELLITUS WITH HYPERGLYCEMIA, WITH LONG-TERM CURRENT USE OF INSULIN (HCC): ICD-10-CM

## 2024-10-29 DIAGNOSIS — R00.1 BRADYCARDIA: ICD-10-CM

## 2024-10-29 LAB
ALBUMIN SERPL-MCNC: 3.6 G/DL (ref 3.2–4.6)
ALBUMIN/GLOB SERPL: 1.3 (ref 1–1.9)
ALP SERPL-CCNC: 69 U/L (ref 40–129)
ALT SERPL-CCNC: 25 U/L (ref 8–55)
ANION GAP SERPL CALC-SCNC: 10 MMOL/L (ref 7–16)
AST SERPL-CCNC: 24 U/L (ref 15–37)
BASOPHILS # BLD: 0 K/UL (ref 0–0.2)
BASOPHILS NFR BLD: 0 % (ref 0–2)
BILIRUB SERPL-MCNC: 0.6 MG/DL (ref 0–1.2)
BUN SERPL-MCNC: 29 MG/DL (ref 8–23)
CALCIUM SERPL-MCNC: 9.7 MG/DL (ref 8.8–10.2)
CHLORIDE SERPL-SCNC: 108 MMOL/L (ref 98–107)
CO2 SERPL-SCNC: 26 MMOL/L (ref 20–29)
CREAT SERPL-MCNC: 1.32 MG/DL (ref 0.8–1.3)
DIFFERENTIAL METHOD BLD: ABNORMAL
EOSINOPHIL # BLD: 0.2 K/UL (ref 0–0.8)
EOSINOPHIL NFR BLD: 3 % (ref 0.5–7.8)
ERYTHROCYTE [DISTWIDTH] IN BLOOD BY AUTOMATED COUNT: 14 % (ref 11.9–14.6)
GLOBULIN SER CALC-MCNC: 2.7 G/DL (ref 2.3–3.5)
GLUCOSE SERPL-MCNC: 175 MG/DL (ref 70–99)
HCT VFR BLD AUTO: 48.6 % (ref 41.1–50.3)
HGB BLD-MCNC: 15.5 G/DL (ref 13.6–17.2)
IMM GRANULOCYTES # BLD AUTO: 0 K/UL (ref 0–0.5)
IMM GRANULOCYTES NFR BLD AUTO: 0 % (ref 0–5)
LIPASE SERPL-CCNC: 51 U/L (ref 13–60)
LYMPHOCYTES # BLD: 1.3 K/UL (ref 0.5–4.6)
LYMPHOCYTES NFR BLD: 20 % (ref 13–44)
MCH RBC QN AUTO: 29.2 PG (ref 26.1–32.9)
MCHC RBC AUTO-ENTMCNC: 31.9 G/DL (ref 31.4–35)
MCV RBC AUTO: 91.5 FL (ref 82–102)
MONOCYTES # BLD: 0.5 K/UL (ref 0.1–1.3)
MONOCYTES NFR BLD: 8 % (ref 4–12)
NEUTS SEG # BLD: 4.4 K/UL (ref 1.7–8.2)
NEUTS SEG NFR BLD: 69 % (ref 43–78)
NRBC # BLD: 0 K/UL (ref 0–0.2)
PLATELET # BLD AUTO: 108 K/UL (ref 150–450)
PMV BLD AUTO: 10.9 FL (ref 9.4–12.3)
POTASSIUM SERPL-SCNC: 4.4 MMOL/L (ref 3.5–5.1)
PROT SERPL-MCNC: 6.3 G/DL (ref 6.3–8.2)
RBC # BLD AUTO: 5.31 M/UL (ref 4.23–5.6)
SODIUM SERPL-SCNC: 144 MMOL/L (ref 136–145)
WBC # BLD AUTO: 6.4 K/UL (ref 4.3–11.1)

## 2024-10-29 RX ORDER — ACETAMINOPHEN 500 MG
500 TABLET ORAL EVERY 6 HOURS PRN
COMMUNITY

## 2024-10-29 RX ORDER — METOPROLOL TARTRATE 50 MG
TABLET ORAL
COMMUNITY
Start: 2024-08-12 | End: 2024-10-29 | Stop reason: CLARIF

## 2024-10-29 NOTE — PROGRESS NOTES
10/29/2024    Chief Complaint   Patient presents with    Follow-up     Diabetes - Glucose am today 172 fasting     OTHER     Renewal of handicap placard request        Assessment and plan     1. Essential (primary) hypertension  -     CBC with Auto Differential; Future  -     Comprehensive Metabolic Panel; Future  -     Urinalysis; Future  -     EKG 12 Lead; Future  2. Uncontrolled type 2 diabetes mellitus with hyperglycemia, with long-term current use of insulin ([facility]CC)  -     CBC with Auto Differential; Future  -     Comprehensive Metabolic Panel; Future  3. Nausea  -     Lipase; Future  4. Urinary frequency  5. Bradycardia  -     EKG 12 Lead; Future  6. At high risk for falls  -     Handicap [facility]; Starting Tue 10/29/2024, Disp-1 each, R-0, PrintAmbulates with wheelchair  7. CIDP (chronic inflammatory demyelinating polyneuropathy) ([facility])  -     Handicap Placard MISC; Starting Tue 10/29/2024, Disp-1 each, R-0, PrintAmbulates with wheelchair  8. Midline low back pain without sciatica, unspecified chronicity  -     Handicap Placard MISC; Starting Tue 10/29/2024, Disp-1 each, R-0, PrintAmbulates with wheelchair  9. Abnormal EKG  Comments:  Patient has a history of PVCs. Clinically stable in the office     Patient seems to be stable in the office today.  His blood pressure readings are better in the office and at home that he has had in the past few days.  His blood sugar readings are increasing.    Diabetes Mellitus : continue Tresiba 30 units in the morning.   Goal for Fasting blood glucose is 80 to 130 mg/dl If BS Fasting blood glucose is not at goal after 3 days increase Tresiba to 34 units.   He has a history of PVCs as noted in EKG done today . He does not have a true bradycardia .   He will continue medications for now.   Efforts will be made to contact his cardiologist ( [facility])     Handicap placard issued today     His daughter will continue to monitor his blood pressure and Blood Sugar at home

## 2024-10-29 NOTE — PATIENT INSTRUCTIONS
Diabetes Mellitus : continue Tresiba 30 units in the morning.   Goal for Fasting blood glucose is 80 to 130 mg/dl If BS Fasting blood glucose is not at goal after 3 days increase Tresiba to 34 units.     Hypertension: Hold Demadex. Weigh daily . If weight is increasing 1 to 2 lbs in  1 to 2 days . Take Demadex 20 mg. Hold Demadex for BP less than 130/80mmHg.

## 2024-10-30 ENCOUNTER — TELEPHONE (OUTPATIENT)
Dept: INTERNAL MEDICINE CLINIC | Facility: CLINIC | Age: 84
End: 2024-10-30

## 2024-10-30 NOTE — TELEPHONE ENCOUNTER
Called about patient .   This morning   mg/dl . /60 mmHg. ( No demadex)  Current Insulin dose - 30 units of insulin   Patient feels better today .     Discussed recent labs .    His daughter has pointed out that his kidney function has declined since Demadex was added. ( Review of labs shows that this seems to be true).    Plan : continue Insulin at 30 units for 3 days . If FBS is not less than 130 mg/dl, increase insulin to 34 units  Continue to hold. Demadex. Monitor daily weight. One dose of Demadex if Weight is increasing my 1 to 2 lbs in 1 to 2 days or 3 to 5 lbs in a week.     Daughter will call with any further questions .  Letter sent to Cardiologist .

## 2024-11-01 ENCOUNTER — TELEPHONE (OUTPATIENT)
Dept: INTERNAL MEDICINE CLINIC | Facility: CLINIC | Age: 84
End: 2024-11-01

## 2024-11-06 DIAGNOSIS — G89.29 CHRONIC MIDLINE LOW BACK PAIN, UNSPECIFIED WHETHER SCIATICA PRESENT: ICD-10-CM

## 2024-11-06 DIAGNOSIS — M54.50 CHRONIC MIDLINE LOW BACK PAIN, UNSPECIFIED WHETHER SCIATICA PRESENT: ICD-10-CM

## 2024-11-10 RX ORDER — TRAMADOL HYDROCHLORIDE 50 MG/1
50 TABLET ORAL EVERY 12 HOURS PRN
Qty: 60 TABLET | Refills: 0 | Status: SHIPPED | OUTPATIENT
Start: 2024-11-12 | End: 2024-12-12

## 2024-11-12 DIAGNOSIS — G89.29 CHRONIC MIDLINE LOW BACK PAIN, UNSPECIFIED WHETHER SCIATICA PRESENT: ICD-10-CM

## 2024-11-12 DIAGNOSIS — M54.50 CHRONIC MIDLINE LOW BACK PAIN, UNSPECIFIED WHETHER SCIATICA PRESENT: ICD-10-CM

## 2024-11-12 RX ORDER — TRAMADOL HYDROCHLORIDE 50 MG/1
TABLET ORAL
Qty: 60 TABLET | Refills: 0 | OUTPATIENT
Start: 2024-11-12

## 2024-12-04 ENCOUNTER — OFFICE VISIT (OUTPATIENT)
Dept: ENT CLINIC | Age: 84
End: 2024-12-04
Payer: MEDICARE

## 2024-12-04 ENCOUNTER — OFFICE VISIT (OUTPATIENT)
Age: 84
End: 2024-12-04
Payer: MEDICARE

## 2024-12-04 ENCOUNTER — PROCEDURE VISIT (OUTPATIENT)
Age: 84
End: 2024-12-04

## 2024-12-04 VITALS
DIASTOLIC BLOOD PRESSURE: 72 MMHG | WEIGHT: 257 LBS | SYSTOLIC BLOOD PRESSURE: 118 MMHG | BODY MASS INDEX: 36.79 KG/M2 | HEIGHT: 70 IN

## 2024-12-04 DIAGNOSIS — H90.3 SENSORINEURAL HEARING LOSS, BILATERAL: Primary | ICD-10-CM

## 2024-12-04 DIAGNOSIS — J30.9 ALLERGIC RHINITIS, UNSPECIFIED SEASONALITY, UNSPECIFIED TRIGGER: Chronic | ICD-10-CM

## 2024-12-04 DIAGNOSIS — H90.3 SENSORINEURAL HEARING LOSS (SNHL) OF BOTH EARS: Primary | Chronic | ICD-10-CM

## 2024-12-04 DIAGNOSIS — M54.50 CHRONIC MIDLINE LOW BACK PAIN, UNSPECIFIED WHETHER SCIATICA PRESENT: ICD-10-CM

## 2024-12-04 DIAGNOSIS — G89.29 CHRONIC MIDLINE LOW BACK PAIN, UNSPECIFIED WHETHER SCIATICA PRESENT: ICD-10-CM

## 2024-12-04 PROCEDURE — G8427 DOCREV CUR MEDS BY ELIG CLIN: HCPCS | Performed by: PHYSICIAN ASSISTANT

## 2024-12-04 PROCEDURE — 92557 COMPREHENSIVE HEARING TEST: CPT | Performed by: AUDIOLOGIST

## 2024-12-04 PROCEDURE — 1126F AMNT PAIN NOTED NONE PRSNT: CPT | Performed by: PHYSICIAN ASSISTANT

## 2024-12-04 PROCEDURE — 92567 TYMPANOMETRY: CPT | Performed by: AUDIOLOGIST

## 2024-12-04 PROCEDURE — G8417 CALC BMI ABV UP PARAM F/U: HCPCS | Performed by: PHYSICIAN ASSISTANT

## 2024-12-04 PROCEDURE — 3074F SYST BP LT 130 MM HG: CPT | Performed by: PHYSICIAN ASSISTANT

## 2024-12-04 PROCEDURE — 1159F MED LIST DOCD IN RCRD: CPT | Performed by: PHYSICIAN ASSISTANT

## 2024-12-04 PROCEDURE — 1036F TOBACCO NON-USER: CPT | Performed by: PHYSICIAN ASSISTANT

## 2024-12-04 PROCEDURE — 99214 OFFICE O/P EST MOD 30 MIN: CPT | Performed by: PHYSICIAN ASSISTANT

## 2024-12-04 PROCEDURE — G8484 FLU IMMUNIZE NO ADMIN: HCPCS | Performed by: PHYSICIAN ASSISTANT

## 2024-12-04 PROCEDURE — 1123F ACP DISCUSS/DSCN MKR DOCD: CPT | Performed by: PHYSICIAN ASSISTANT

## 2024-12-04 PROCEDURE — 3078F DIAST BP <80 MM HG: CPT | Performed by: PHYSICIAN ASSISTANT

## 2024-12-04 PROCEDURE — 1160F RVW MEDS BY RX/DR IN RCRD: CPT | Performed by: PHYSICIAN ASSISTANT

## 2024-12-04 RX ORDER — METFORMIN HYDROCHLORIDE 500 MG/1
TABLET, EXTENDED RELEASE ORAL
COMMUNITY
Start: 2024-07-15

## 2024-12-04 RX ORDER — METOPROLOL TARTRATE 50 MG
TABLET ORAL
COMMUNITY
Start: 2024-10-31

## 2024-12-04 RX ORDER — POTASSIUM CHLORIDE 750 MG/1
TABLET, EXTENDED RELEASE ORAL
COMMUNITY
Start: 2024-10-31

## 2024-12-04 ASSESSMENT — ENCOUNTER SYMPTOMS
RESPIRATORY NEGATIVE: 1
GASTROINTESTINAL NEGATIVE: 1
ALLERGIC/IMMUNOLOGIC NEGATIVE: 1
EYES NEGATIVE: 1

## 2024-12-04 NOTE — TELEPHONE ENCOUNTER
Requested Prescriptions     Pending Prescriptions Disp Refills    traMADol (ULTRAM) 50 MG tablet 60 tablet 0     Sig: Take 1 tablet by mouth every 12 hours as needed for Pain for up to 30 days. Max Daily Amount: 100 mg        LOV: 10/29/24  Next: 1/20/25    Rx pended.

## 2024-12-04 NOTE — PROGRESS NOTES
Discussed patient's hearing loss and its implications. Discussed different styles and technology levels. The patient does not want to use his insurance for the aids. The patient decided to proceed with purchasing a set of [employer]  24 GAGAN RTs in silver. The patient was scheduled to return in 2 weeks for Nat Coronado CCC-A

## 2024-12-04 NOTE — PROGRESS NOTES
Jermaine Bacon is a 84 y.o. White (non-) male presents today with c/o hearing loss gradually over years. He denies infection and tinnitus. Noise exposure is consistent with  service, explosives and ammunition. He was also working in a factory setting as well. Paternal family has hearing loss.     Audiogram:     Left ear:  mild sloping to severe SNHL  Right ear: mild sloping to profound SNHL  Discrimination score: Right ear 64 % and Left ear 76 %   Tympanogram: Right ear Type A and Left ear Type A.       Chief Complaint   Patient presents with    Hearing Problem     1 year with audio.  Denies pain, drainage, and itching.  Has had gradually decrease in hearing for a while now.  Denies tinnitus.  Patient feels like he can not hear at all in the situation where there is a lot of background noise.  Nasal drainage that makes him have to clear his throat a lot.      Follow-up       Patient Active Problem List   Diagnosis    Abnormal gait    CIDP (chronic inflammatory demyelinating polyneuropathy) ([facility])    Lumbar stenosis    Hypertension    Bilateral edema of lower extremity    Controlled type 2 diabetes mellitus with complication, without long-term current use of insulin ([facility])    PVC (premature ventricular contraction)    Chronic midline low back pain without sciatica    Status post laminectomy    Bacteremia due to Pseudomonas    History of pulmonary embolism    Chronic fatigue    HERNANDEZ (dyspnea on exertion)    Hematuria    Frequent falls    Spinal stenosis of lumbar region with radiculopathy    Weakness of lower extremity    Ventricular enlargement, right    Nonrheumatic mitral valve regurgitation    Enlarged prostate    Hx of decompressive lumbar laminectomy    Gastroesophageal reflux disease    Back pain    Restrictive lung disease    Urinary incontinence    Cervical spinal stenosis    Fall at home    Hypogonadism male    Atrial enlargement, right    Obesity    Background diabetic retinopathy

## 2024-12-04 NOTE — PROGRESS NOTES
AUDIOLOGY EVALUATION    Jermaine Bacon had Tympanometry and Audiometry performed today.    The patient reports hearing loss.     Results as follows:    Tympanometry    Type A -  bilaterally    Audiometry    Test Performed - Comprehensive Audiogram    Type of Loss - Right Ear: abnormal hearing: degree of loss is mild to profound sensorineural hearing loss                           Left Ear: abnormal hearing: degree of loss is mild to severe sensorineural hearing loss     SRT   Measurement Right Ear Left Ear   Value 50 55   Unit dB dB     Discrimination  Measurement Right Ear Left Ear   Value 64% 76%   Unit dB dB     Recommend  Further testing due to asymmetry and amplification following medical clearance    [facility]  Audiologist

## 2024-12-05 RX ORDER — TRAMADOL HYDROCHLORIDE 50 MG/1
50 TABLET ORAL EVERY 12 HOURS PRN
Qty: 60 TABLET | Refills: 0 | Status: SHIPPED | OUTPATIENT
Start: 2024-12-12 | End: 2025-01-11

## 2024-12-19 ENCOUNTER — OFFICE VISIT (OUTPATIENT)
Dept: AUDIOLOGY | Age: 84
End: 2024-12-19

## 2024-12-19 DIAGNOSIS — H90.3 SENSORINEURAL HEARING LOSS, BILATERAL: Primary | ICD-10-CM

## 2024-12-19 PROCEDURE — V5160 DISPENSING FEE BINAURAL: HCPCS | Performed by: AUDIOLOGIST

## 2024-12-19 PROCEDURE — V5261 HEARING AID, DIGIT, BIN, BTE: HCPCS | Performed by: AUDIOLOGIST

## 2024-12-19 NOTE — PROGRESS NOTES
Patient was fit with a set of [employer] 24 GAGAN RTs (right: [id number]; left: [id number]; : [id number]). Warranty expires 2/3/28. The aids were set to adaptation level 3 with volume controls activated. The patient reports that the aids sound good in the office. Discussed use and care of the aids. Paired the aids to his daughter's phone and discussed lora use. The patient was scheduled to return in 2 weeks.  Nat Looney CCC-A

## 2024-12-21 ENCOUNTER — TELEPHONE (OUTPATIENT)
Dept: INTERNAL MEDICINE CLINIC | Facility: CLINIC | Age: 84
End: 2024-12-21

## 2024-12-21 NOTE — TELEPHONE ENCOUNTER
I called and spoke to his daughter regarding INR level.  She says she checked his INR today it was 3.8.  Clinic changes he has made his increase insulin to about 40 units and recently had to take extra Lasix because his legs were swollen.    Plan: Hold Coumadin 2 to 3 days.  She will call with INR result on Monday.

## 2024-12-23 ENCOUNTER — TELEPHONE (OUTPATIENT)
Dept: ENT CLINIC | Age: 84
End: 2024-12-23

## 2024-12-23 NOTE — TELEPHONE ENCOUNTER
I called Rosanne and AMANDA that I was trying to see if the payment we unsuccessfully attempted to collect on 12/19/2024 had been processed through her bank. On our end it does not show the payment went through. At the time we thought the bank needed to approve the amount of $6400 for hearing aids and she contacted them while in the office. She received a message from her bank saying they would allow the charge. Credit card readers were down for the market at the time of check out and Marisol said to send the hearing aids with the patient and would could call to collect payment at a later date. As of 12/23/24 at 11:00 the payment is not showing in one source or epic. If patient calls back attempting to make payment or has questions please let one of us at the  know.

## 2024-12-30 DIAGNOSIS — Z86.718 PERSONAL HISTORY OF THROMBOEMBOLIC DISEASE: ICD-10-CM

## 2024-12-30 RX ORDER — WARFARIN SODIUM 5 MG/1
TABLET ORAL
Qty: 90 TABLET | Refills: 1 | Status: SHIPPED | OUTPATIENT
Start: 2024-12-30

## 2025-01-02 ENCOUNTER — PROCEDURE VISIT (OUTPATIENT)
Dept: AUDIOLOGY | Age: 85
End: 2025-01-02

## 2025-01-02 DIAGNOSIS — Z97.4 USES HEARING AID: Primary | ICD-10-CM

## 2025-01-02 NOTE — PROGRESS NOTES
Patient's daughter states he would like to return his hearing aids. She states that he feels he is not getting enough clarity from the aids when watching TV and that he does not like wearing the aids. Returned both aids and the  to [employer].   Nat Looney CCC-A

## 2025-01-09 DIAGNOSIS — G89.29 CHRONIC MIDLINE LOW BACK PAIN, UNSPECIFIED WHETHER SCIATICA PRESENT: ICD-10-CM

## 2025-01-09 DIAGNOSIS — M54.50 CHRONIC MIDLINE LOW BACK PAIN, UNSPECIFIED WHETHER SCIATICA PRESENT: ICD-10-CM

## 2025-01-09 NOTE — TELEPHONE ENCOUNTER
Refill request on:  Tramadol 50 mg - Take 1 tablet by mouth every 12 hours as needed for Pain for up to 30 days. Max Daily Amount: 100 mg     LOV - 10/29/24  Next appt - 1/20/25    Rx pended   Tank you!

## 2025-01-10 DIAGNOSIS — E11.69 HYPERLIPIDEMIA ASSOCIATED WITH TYPE 2 DIABETES MELLITUS (HCC): Primary | ICD-10-CM

## 2025-01-10 DIAGNOSIS — Z79.4 UNCONTROLLED TYPE 2 DIABETES MELLITUS WITH HYPERGLYCEMIA, WITH LONG-TERM CURRENT USE OF INSULIN (HCC): ICD-10-CM

## 2025-01-10 DIAGNOSIS — Z79.01 LONG TERM CURRENT USE OF ANTICOAGULANT: ICD-10-CM

## 2025-01-10 DIAGNOSIS — E11.65 UNCONTROLLED TYPE 2 DIABETES MELLITUS WITH HYPERGLYCEMIA, WITH LONG-TERM CURRENT USE OF INSULIN (HCC): ICD-10-CM

## 2025-01-10 DIAGNOSIS — I10 ESSENTIAL (PRIMARY) HYPERTENSION: ICD-10-CM

## 2025-01-10 DIAGNOSIS — E78.5 HYPERLIPIDEMIA ASSOCIATED WITH TYPE 2 DIABETES MELLITUS (HCC): Primary | ICD-10-CM

## 2025-01-10 RX ORDER — TRAMADOL HYDROCHLORIDE 50 MG/1
50 TABLET ORAL EVERY 12 HOURS PRN
Qty: 60 TABLET | Refills: 0 | Status: SHIPPED | OUTPATIENT
Start: 2025-01-11 | End: 2025-02-10

## 2025-01-17 DIAGNOSIS — Z79.01 LONG TERM CURRENT USE OF ANTICOAGULANT: ICD-10-CM

## 2025-01-17 DIAGNOSIS — Z79.4 UNCONTROLLED TYPE 2 DIABETES MELLITUS WITH HYPERGLYCEMIA, WITH LONG-TERM CURRENT USE OF INSULIN (HCC): ICD-10-CM

## 2025-01-17 DIAGNOSIS — E11.65 UNCONTROLLED TYPE 2 DIABETES MELLITUS WITH HYPERGLYCEMIA, WITH LONG-TERM CURRENT USE OF INSULIN (HCC): ICD-10-CM

## 2025-01-17 DIAGNOSIS — E78.5 HYPERLIPIDEMIA ASSOCIATED WITH TYPE 2 DIABETES MELLITUS (HCC): ICD-10-CM

## 2025-01-17 DIAGNOSIS — E11.69 HYPERLIPIDEMIA ASSOCIATED WITH TYPE 2 DIABETES MELLITUS (HCC): ICD-10-CM

## 2025-01-17 DIAGNOSIS — I10 ESSENTIAL (PRIMARY) HYPERTENSION: ICD-10-CM

## 2025-01-17 LAB
ALBUMIN SERPL-MCNC: 3.5 G/DL (ref 3.2–4.6)
ALBUMIN/GLOB SERPL: 1.4 (ref 1–1.9)
ALP SERPL-CCNC: 79 U/L (ref 40–129)
ALT SERPL-CCNC: 23 U/L (ref 8–55)
ANION GAP SERPL CALC-SCNC: 12 MMOL/L (ref 7–16)
AST SERPL-CCNC: 18 U/L (ref 15–37)
BASOPHILS # BLD: 0.01 K/UL (ref 0–0.2)
BASOPHILS NFR BLD: 0.2 % (ref 0–2)
BILIRUB SERPL-MCNC: 0.7 MG/DL (ref 0–1.2)
BUN SERPL-MCNC: 32 MG/DL (ref 8–23)
CALCIUM SERPL-MCNC: 9.2 MG/DL (ref 8.8–10.2)
CHLORIDE SERPL-SCNC: 107 MMOL/L (ref 98–107)
CHOLEST SERPL-MCNC: 143 MG/DL (ref 0–200)
CO2 SERPL-SCNC: 24 MMOL/L (ref 20–29)
CREAT SERPL-MCNC: 1.23 MG/DL (ref 0.8–1.3)
DIFFERENTIAL METHOD BLD: ABNORMAL
EOSINOPHIL # BLD: 0.22 K/UL (ref 0–0.8)
EOSINOPHIL NFR BLD: 3.4 % (ref 0.5–7.8)
ERYTHROCYTE [DISTWIDTH] IN BLOOD BY AUTOMATED COUNT: 14 % (ref 11.9–14.6)
EST. AVERAGE GLUCOSE BLD GHB EST-MCNC: 193 MG/DL
GLOBULIN SER CALC-MCNC: 2.5 G/DL (ref 2.3–3.5)
GLUCOSE SERPL-MCNC: 173 MG/DL (ref 70–99)
HBA1C MFR BLD: 8.3 % (ref 0–5.6)
HCT VFR BLD AUTO: 48.9 % (ref 41.1–50.3)
HDLC SERPL-MCNC: 35 MG/DL (ref 40–60)
HDLC SERPL: 4.1 (ref 0–5)
HGB BLD-MCNC: 15.9 G/DL (ref 13.6–17.2)
IMM GRANULOCYTES # BLD AUTO: 0.03 K/UL (ref 0–0.5)
IMM GRANULOCYTES NFR BLD AUTO: 0.5 % (ref 0–5)
LDLC SERPL CALC-MCNC: 64 MG/DL (ref 0–100)
LYMPHOCYTES # BLD: 1.21 K/UL (ref 0.5–4.6)
LYMPHOCYTES NFR BLD: 18.7 % (ref 13–44)
MCH RBC QN AUTO: 28.5 PG (ref 26.1–32.9)
MCHC RBC AUTO-ENTMCNC: 32.5 G/DL (ref 31.4–35)
MCV RBC AUTO: 87.6 FL (ref 82–102)
MONOCYTES # BLD: 0.47 K/UL (ref 0.1–1.3)
MONOCYTES NFR BLD: 7.3 % (ref 4–12)
NEUTS SEG # BLD: 4.53 K/UL (ref 1.7–8.2)
NEUTS SEG NFR BLD: 69.9 % (ref 43–78)
NRBC # BLD: 0 K/UL (ref 0–0.2)
PLATELET # BLD AUTO: 109 K/UL (ref 150–450)
PMV BLD AUTO: 11.2 FL (ref 9.4–12.3)
POTASSIUM SERPL-SCNC: 4.8 MMOL/L (ref 3.5–5.1)
PROT SERPL-MCNC: 6 G/DL (ref 6.3–8.2)
RBC # BLD AUTO: 5.58 M/UL (ref 4.23–5.6)
SODIUM SERPL-SCNC: 142 MMOL/L (ref 136–145)
TRIGL SERPL-MCNC: 222 MG/DL (ref 0–150)
VLDLC SERPL CALC-MCNC: 44 MG/DL (ref 6–23)
WBC # BLD AUTO: 6.5 K/UL (ref 4.3–11.1)

## 2025-01-19 ENCOUNTER — PATIENT MESSAGE (OUTPATIENT)
Dept: INTERNAL MEDICINE CLINIC | Facility: CLINIC | Age: 85
End: 2025-01-19

## 2025-01-20 ENCOUNTER — TELEMEDICINE (OUTPATIENT)
Dept: INTERNAL MEDICINE CLINIC | Facility: CLINIC | Age: 85
End: 2025-01-20
Payer: MEDICARE

## 2025-01-20 DIAGNOSIS — E11.65 UNCONTROLLED TYPE 2 DIABETES MELLITUS WITH HYPERGLYCEMIA, WITH LONG-TERM CURRENT USE OF INSULIN (HCC): Primary | ICD-10-CM

## 2025-01-20 DIAGNOSIS — Z79.4 UNCONTROLLED TYPE 2 DIABETES MELLITUS WITH HYPERGLYCEMIA, WITH LONG-TERM CURRENT USE OF INSULIN (HCC): Primary | ICD-10-CM

## 2025-01-20 DIAGNOSIS — E78.5 HYPERLIPIDEMIA ASSOCIATED WITH TYPE 2 DIABETES MELLITUS (HCC): ICD-10-CM

## 2025-01-20 DIAGNOSIS — R60.0 BILATERAL LOWER EXTREMITY EDEMA: ICD-10-CM

## 2025-01-20 DIAGNOSIS — K21.9 GASTROESOPHAGEAL REFLUX DISEASE WITHOUT ESOPHAGITIS: ICD-10-CM

## 2025-01-20 DIAGNOSIS — Z86.718 PERSONAL HISTORY OF THROMBOEMBOLIC DISEASE: ICD-10-CM

## 2025-01-20 DIAGNOSIS — D69.6 THROMBOCYTOPENIA, UNSPECIFIED (HCC): ICD-10-CM

## 2025-01-20 DIAGNOSIS — E11.69 HYPERLIPIDEMIA ASSOCIATED WITH TYPE 2 DIABETES MELLITUS (HCC): ICD-10-CM

## 2025-01-20 DIAGNOSIS — I10 ESSENTIAL (PRIMARY) HYPERTENSION: ICD-10-CM

## 2025-01-20 DIAGNOSIS — G61.81 CIDP (CHRONIC INFLAMMATORY DEMYELINATING POLYNEUROPATHY) (HCC): ICD-10-CM

## 2025-01-20 DIAGNOSIS — Z79.01 LONG TERM CURRENT USE OF ANTICOAGULANT: ICD-10-CM

## 2025-01-20 DIAGNOSIS — N40.0 ENLARGED PROSTATE: ICD-10-CM

## 2025-01-20 PROCEDURE — 1160F RVW MEDS BY RX/DR IN RCRD: CPT | Performed by: INTERNAL MEDICINE

## 2025-01-20 PROCEDURE — 3052F HG A1C>EQUAL 8.0%<EQUAL 9.0%: CPT | Performed by: INTERNAL MEDICINE

## 2025-01-20 PROCEDURE — 1123F ACP DISCUSS/DSCN MKR DOCD: CPT | Performed by: INTERNAL MEDICINE

## 2025-01-20 PROCEDURE — G8427 DOCREV CUR MEDS BY ELIG CLIN: HCPCS | Performed by: INTERNAL MEDICINE

## 2025-01-20 PROCEDURE — 99215 OFFICE O/P EST HI 40 MIN: CPT | Performed by: INTERNAL MEDICINE

## 2025-01-20 PROCEDURE — 1159F MED LIST DOCD IN RCRD: CPT | Performed by: INTERNAL MEDICINE

## 2025-01-20 RX ORDER — TAMSULOSIN HYDROCHLORIDE 0.4 MG/1
0.4 CAPSULE ORAL NIGHTLY
Qty: 90 CAPSULE | Refills: 1 | Status: SHIPPED | OUTPATIENT
Start: 2025-01-20

## 2025-01-20 RX ORDER — FUROSEMIDE 20 MG/1
20 TABLET ORAL DAILY
Qty: 90 TABLET | Refills: 3 | Status: SHIPPED | OUTPATIENT
Start: 2025-01-20

## 2025-01-20 NOTE — PROGRESS NOTES
2025    Jermaine Bacon (: 1940) is a 84 y.o. male, Established patient, here for evaluation of the following chief complaint(s):     Chief Complaint   Patient presents with    Follow-up Chronic Condition         Assessment & Plan:     1. Uncontrolled type 2 diabetes mellitus with hyperglycemia, with long-term current use of insulin ([facility])  2. Essential (primary) hypertension  -     furosemide (LASIX) 20 MG tablet; Take 1 tablet by mouth daily, Disp-90 tablet, R-3Normal  -     Basic Metabolic Panel; Future  3. CIDP (chronic inflammatory demyelinating polyneuropathy) ([facility])  4. Hyperlipidemia associated with type 2 diabetes mellitus ([facility])  5. Long term current use of anticoagulant  6. Gastroesophageal reflux disease without esophagitis  7. Bilateral lower extremity edema  -     [facility] - [facility] Kerr [facility]  8. Enlarged prostate  -     tamsulosin (FLOMAX) 0.4 MG capsule; Take 1 capsule by mouth at bedtime, Disp-90 capsule, R-1Normal  9. Thrombocytopenia, unspecified ([facility])  10. Personal history of thromboembolic disease    Patient appears well today.    Recent test results discussed with him today.    Diabetes mellitus: Seems to be doing well.  Hemoglobin A1c 8.3 nice 84-year-old male.  No hypoglycemia.  Discussed with patient and his daughter.  At this point emigrant changes dose of medication.  Will continue current dose of insulin.  Modify his diet as needed.  We could aim for a hemoglobin A1c of 8.0 .  No refills of insulin sent to the pharmacy today.  His daughter says that he has enough for now she will let us know when she needs a refill.  At that time she needed a generic Tresiba to be sent to the pharmacy  Hypertension: Blood pressure is acceptable today.    Hyperlipidemia: Doing well.  No change in care.  Edema: Unable to examine patient today.  He is asymptomatic for any signs or symptoms of CHF.  His daughter's request will resume Lasix at 20 mg 
English

## 2025-01-21 ENCOUNTER — PATIENT MESSAGE (OUTPATIENT)
Dept: INTERNAL MEDICINE CLINIC | Facility: CLINIC | Age: 85
End: 2025-01-21

## 2025-02-12 ENCOUNTER — PATIENT MESSAGE (OUTPATIENT)
Dept: INTERNAL MEDICINE CLINIC | Facility: CLINIC | Age: 85
End: 2025-02-12

## 2025-02-12 DIAGNOSIS — M54.50 MIDLINE LOW BACK PAIN WITHOUT SCIATICA, UNSPECIFIED CHRONICITY: Primary | ICD-10-CM

## 2025-02-12 NOTE — TELEPHONE ENCOUNTER
Requested Prescriptions     Pending Prescriptions Disp Refills    traMADol (ULTRAM) 50 MG tablet 60 tablet 0     Sig: Take 1 tablet by mouth every 12 hours as needed for Pain for up to 30 days. Max Daily Amount: 100 mg        Last OV: 1/20/25  Next Appt: 5/20/25    Rx Pended.

## 2025-02-14 RX ORDER — TRAMADOL HYDROCHLORIDE 50 MG/1
50 TABLET ORAL EVERY 12 HOURS PRN
Qty: 60 TABLET | Refills: 0 | Status: SHIPPED | OUTPATIENT
Start: 2025-02-14 | End: 2025-03-16

## 2025-02-19 ENCOUNTER — TELEPHONE (OUTPATIENT)
Dept: INTERNAL MEDICINE CLINIC | Facility: CLINIC | Age: 85
End: 2025-02-19

## 2025-02-19 NOTE — TELEPHONE ENCOUNTER
----- Message from Dr. Tisha Paniagua MD sent at 2/15/2025 12:53 PM EST -----  Last PT INR low/ borderline.Continue current dose of Coumadin .   Repeat this week and send results and [employer] messages about INR level

## 2025-02-21 ENCOUNTER — TELEPHONE (OUTPATIENT)
Dept: INTERNAL MEDICINE CLINIC | Facility: CLINIC | Age: 85
End: 2025-02-21

## 2025-02-21 DIAGNOSIS — Z86.718 PERSONAL HISTORY OF THROMBOEMBOLIC DISEASE: Primary | ICD-10-CM

## 2025-02-21 RX ORDER — WARFARIN SODIUM 5 MG/1
TABLET ORAL
Qty: 30 TABLET | Refills: 3
Start: 2025-02-21

## 2025-02-21 RX ORDER — WARFARIN SODIUM 7.5 MG/1
TABLET ORAL
Qty: 30 TABLET | Refills: 3
Start: 2025-02-21

## 2025-02-21 NOTE — TELEPHONE ENCOUNTER
Spoke to patient's daughter Rosanne regarding low INR.   Last INR was also 1.9  Recommend Coumadin 7.5 mg on Saturday and Tuesday and 5 mg on  other days. Check INR next Thursday and call with results

## 2025-03-11 DIAGNOSIS — M54.50 MIDLINE LOW BACK PAIN WITHOUT SCIATICA, UNSPECIFIED CHRONICITY: ICD-10-CM

## 2025-03-11 RX ORDER — TRAMADOL HYDROCHLORIDE 50 MG/1
50 TABLET ORAL EVERY 12 HOURS PRN
Qty: 60 TABLET | Refills: 0 | Status: SHIPPED | OUTPATIENT
Start: 2025-03-16 | End: 2025-04-15

## 2025-03-21 ENCOUNTER — TELEPHONE (OUTPATIENT)
Dept: INTERNAL MEDICINE CLINIC | Facility: CLINIC | Age: 85
End: 2025-03-21

## 2025-03-21 NOTE — TELEPHONE ENCOUNTER
I spoke to patient's daughter and relayed regarding his situation with insulin.  She will need to find out what insulin is covered by his insurance.  See suggested that Tresiba is not on formulary.  Generic Tresiba is on backorder.    Also spoke to her about CPAP supplies.  She is saying that the company is requesting patient's record notes a order for CPAP supplies.  I have explained to her that what I would like is for the company to send a request for supplies as they have done in the past for completion.  Request is all very confusing to me.  In the past have not had to generate a request or submit notes for the supplies.  She needs to clarify this request.  The patient's chart shows that he last saw pulmonology in 2023 and at that time DME supplies ordered for CPAP.    I will wait to hear from her regarding both situations.

## 2025-03-25 ENCOUNTER — TELEPHONE (OUTPATIENT)
Dept: INTERNAL MEDICINE CLINIC | Facility: CLINIC | Age: 85
End: 2025-03-25

## 2025-03-25 DIAGNOSIS — E11.65 UNCONTROLLED TYPE 2 DIABETES MELLITUS WITH HYPERGLYCEMIA, WITH LONG-TERM CURRENT USE OF INSULIN (HCC): Primary | ICD-10-CM

## 2025-03-25 DIAGNOSIS — Z79.4 UNCONTROLLED TYPE 2 DIABETES MELLITUS WITH HYPERGLYCEMIA, WITH LONG-TERM CURRENT USE OF INSULIN (HCC): Primary | ICD-10-CM

## 2025-03-25 RX ORDER — INSULIN GLARGINE 100 [IU]/ML
INJECTION, SOLUTION SUBCUTANEOUS
Qty: 5 ADJUSTABLE DOSE PRE-FILLED PEN SYRINGE | Refills: 3 | Status: SHIPPED | OUTPATIENT
Start: 2025-03-25

## 2025-03-26 ENCOUNTER — TELEPHONE (OUTPATIENT)
Dept: INTERNAL MEDICINE CLINIC | Facility: CLINIC | Age: 85
End: 2025-03-26

## 2025-03-26 NOTE — TELEPHONE ENCOUNTER
I called and spoke to pharmacy technician Sara today.  She confirmed that they were filling the prescription for the insulin glargine for Mr. Bacon.  He did not need any other information for me.

## 2025-03-26 NOTE — TELEPHONE ENCOUNTER
Called patient's daughter.  She says the pharmacy called about it insulin prescription and I need to call them to clarify the change in his prescription order.  Patient has not used insulin for about 10 days.  Blood sugars in the 200s.  He did not want to try metformin until he got his new insulin prescription.

## 2025-03-31 ENCOUNTER — RESULTS FOLLOW-UP (OUTPATIENT)
Dept: INTERNAL MEDICINE CLINIC | Facility: CLINIC | Age: 85
End: 2025-03-31

## 2025-04-14 DIAGNOSIS — M54.50 MIDLINE LOW BACK PAIN WITHOUT SCIATICA, UNSPECIFIED CHRONICITY: ICD-10-CM

## 2025-04-14 NOTE — TELEPHONE ENCOUNTER
Requested Prescriptions     Pending Prescriptions Disp Refills    traMADol (ULTRAM) 50 MG tablet 60 tablet 0     Sig: Take 1 tablet by mouth every 12 hours as needed for Pain for up to 30 days. Max Daily Amount: 100 mg        Last OV: 1/20/25  Next Appt: none scheduled    Rx Pended.

## 2025-04-15 RX ORDER — TRAMADOL HYDROCHLORIDE 50 MG/1
50 TABLET ORAL EVERY 12 HOURS PRN
Qty: 60 TABLET | Refills: 0 | Status: SHIPPED | OUTPATIENT
Start: 2025-04-15 | End: 2025-05-15

## 2025-05-08 DIAGNOSIS — M54.50 MIDLINE LOW BACK PAIN WITHOUT SCIATICA, UNSPECIFIED CHRONICITY: ICD-10-CM

## 2025-05-08 DIAGNOSIS — Z86.718 PERSONAL HISTORY OF THROMBOEMBOLIC DISEASE: ICD-10-CM

## 2025-05-08 RX ORDER — WARFARIN SODIUM 5 MG/1
TABLET ORAL
Qty: 30 TABLET | Refills: 3 | Status: SHIPPED | OUTPATIENT
Start: 2025-05-08

## 2025-05-08 RX ORDER — WARFARIN SODIUM 7.5 MG/1
TABLET ORAL
Qty: 30 TABLET | Refills: 3 | Status: SHIPPED | OUTPATIENT
Start: 2025-05-08

## 2025-05-08 NOTE — TELEPHONE ENCOUNTER
I have reviewed the patient’s controlled substance prescription history, as maintained in the [place] prescription monitoring program, so that the prescription(s) for a  controlled substance can be given.   Due on 5/15/25

## 2025-05-08 NOTE — TELEPHONE ENCOUNTER
Requested Prescriptions     Pending Prescriptions Disp Refills    traMADol (ULTRAM) 50 MG tablet 60 tablet 0     Sig: Take 1 tablet by mouth every 12 hours as needed for Pain for up to 30 days. Max Daily Amount: 100 mg

## 2025-05-08 NOTE — TELEPHONE ENCOUNTER
Requested Prescriptions     Pending Prescriptions Disp Refills    warfarin (COUMADIN) 7.5 MG tablet 30 tablet 3     Sig: Take one tab once a day in the PM on Saturdays and  once a day in the PM on Tuesdays    warfarin (COUMADIN) 5 MG tablet 30 tablet 3     Sig: Take 1 tablet once a day in the p.m. on Sundays Mondays Wednesdays Thursdays and Fridays        Last OV: 1/20/25   Next Appt: 5/30/25 NTP.    Rx Pended.

## 2025-05-12 RX ORDER — TRAMADOL HYDROCHLORIDE 50 MG/1
50 TABLET ORAL EVERY 12 HOURS PRN
Qty: 60 TABLET | Refills: 0 | Status: SHIPPED | OUTPATIENT
Start: 2025-05-15 | End: 2025-06-14

## 2025-05-30 ENCOUNTER — LAB (OUTPATIENT)
Dept: FAMILY MEDICINE CLINIC | Facility: CLINIC | Age: 85
End: 2025-05-30

## 2025-05-30 ENCOUNTER — OFFICE VISIT (OUTPATIENT)
Dept: INTERNAL MEDICINE CLINIC | Facility: CLINIC | Age: 85
End: 2025-05-30

## 2025-05-30 VITALS
RESPIRATION RATE: 16 BRPM | WEIGHT: 257 LBS | OXYGEN SATURATION: 92 % | DIASTOLIC BLOOD PRESSURE: 74 MMHG | SYSTOLIC BLOOD PRESSURE: 125 MMHG | HEIGHT: 70 IN | TEMPERATURE: 97.5 F | BODY MASS INDEX: 36.79 KG/M2 | HEART RATE: 63 BPM

## 2025-05-30 DIAGNOSIS — E11.65 TYPE 2 DIABETES MELLITUS WITH HYPERGLYCEMIA, WITHOUT LONG-TERM CURRENT USE OF INSULIN (HCC): ICD-10-CM

## 2025-05-30 DIAGNOSIS — E11.69 HYPERLIPIDEMIA ASSOCIATED WITH TYPE 2 DIABETES MELLITUS (HCC): ICD-10-CM

## 2025-05-30 DIAGNOSIS — E11.65 UNCONTROLLED TYPE 2 DIABETES MELLITUS WITH HYPERGLYCEMIA, WITH LONG-TERM CURRENT USE OF INSULIN (HCC): ICD-10-CM

## 2025-05-30 DIAGNOSIS — I10 ESSENTIAL (PRIMARY) HYPERTENSION: ICD-10-CM

## 2025-05-30 DIAGNOSIS — E11.65 TYPE 2 DIABETES MELLITUS WITH HYPERGLYCEMIA, WITHOUT LONG-TERM CURRENT USE OF INSULIN (HCC): Primary | ICD-10-CM

## 2025-05-30 DIAGNOSIS — R53.1 WEAKNESS GENERALIZED: ICD-10-CM

## 2025-05-30 DIAGNOSIS — E29.1 HYPOGONADISM MALE: ICD-10-CM

## 2025-05-30 DIAGNOSIS — M54.50 MIDLINE LOW BACK PAIN WITHOUT SCIATICA, UNSPECIFIED CHRONICITY: ICD-10-CM

## 2025-05-30 DIAGNOSIS — I10 PRIMARY HYPERTENSION: ICD-10-CM

## 2025-05-30 DIAGNOSIS — Z79.4 TYPE 2 DIABETES MELLITUS WITH HYPERGLYCEMIA, WITH LONG-TERM CURRENT USE OF INSULIN (HCC): ICD-10-CM

## 2025-05-30 DIAGNOSIS — Z79.01 ANTICOAGULATED ON COUMADIN: ICD-10-CM

## 2025-05-30 DIAGNOSIS — E78.5 HYPERLIPIDEMIA ASSOCIATED WITH TYPE 2 DIABETES MELLITUS (HCC): ICD-10-CM

## 2025-05-30 DIAGNOSIS — D69.6 THROMBOCYTOPENIA, UNSPECIFIED: ICD-10-CM

## 2025-05-30 DIAGNOSIS — N18.2 STAGE 2 CHRONIC KIDNEY DISEASE: ICD-10-CM

## 2025-05-30 DIAGNOSIS — K21.9 GASTROESOPHAGEAL REFLUX DISEASE WITHOUT ESOPHAGITIS: ICD-10-CM

## 2025-05-30 DIAGNOSIS — G47.33 OBSTRUCTIVE SLEEP APNEA: ICD-10-CM

## 2025-05-30 DIAGNOSIS — R60.0 BILATERAL EDEMA OF LOWER EXTREMITY: ICD-10-CM

## 2025-05-30 DIAGNOSIS — E66.01 SEVERE OBESITY (BMI 35.0-39.9) WITH COMORBIDITY (HCC): ICD-10-CM

## 2025-05-30 DIAGNOSIS — Z79.4 UNCONTROLLED TYPE 2 DIABETES MELLITUS WITH HYPERGLYCEMIA, WITH LONG-TERM CURRENT USE OF INSULIN (HCC): ICD-10-CM

## 2025-05-30 DIAGNOSIS — E11.65 TYPE 2 DIABETES MELLITUS WITH HYPERGLYCEMIA, WITH LONG-TERM CURRENT USE OF INSULIN (HCC): ICD-10-CM

## 2025-05-30 LAB
ALBUMIN SERPL-MCNC: 3.6 G/DL (ref 3.2–4.6)
ALBUMIN/GLOB SERPL: 1.1 (ref 1–1.9)
ALP SERPL-CCNC: 80 U/L (ref 40–129)
ALT SERPL-CCNC: 22 U/L (ref 8–55)
ANION GAP SERPL CALC-SCNC: 12 MMOL/L (ref 7–16)
AST SERPL-CCNC: 21 U/L (ref 15–37)
BASOPHILS # BLD: 0.02 K/UL (ref 0–0.2)
BASOPHILS NFR BLD: 0.4 % (ref 0–2)
BILIRUB SERPL-MCNC: 0.9 MG/DL (ref 0–1.2)
BUN SERPL-MCNC: 20 MG/DL (ref 8–23)
CALCIUM SERPL-MCNC: 9.8 MG/DL (ref 8.8–10.2)
CHLORIDE SERPL-SCNC: 103 MMOL/L (ref 98–107)
CO2 SERPL-SCNC: 28 MMOL/L (ref 20–29)
CREAT SERPL-MCNC: 1.21 MG/DL (ref 0.8–1.3)
CREAT UR-MCNC: 107 MG/DL (ref 39–259)
DIFFERENTIAL METHOD BLD: ABNORMAL
EOSINOPHIL # BLD: 0.22 K/UL (ref 0–0.8)
EOSINOPHIL NFR BLD: 4.1 % (ref 0.5–7.8)
ERYTHROCYTE [DISTWIDTH] IN BLOOD BY AUTOMATED COUNT: 14.1 % (ref 11.9–14.6)
EST. AVERAGE GLUCOSE BLD GHB EST-MCNC: 207 MG/DL
GLOBULIN SER CALC-MCNC: 3.1 G/DL (ref 2.3–3.5)
GLUCOSE SERPL-MCNC: 178 MG/DL (ref 70–99)
HBA1C MFR BLD: 8.9 % (ref 0–5.6)
HCT VFR BLD AUTO: 49.3 % (ref 41.1–50.3)
HGB BLD-MCNC: 17.1 G/DL (ref 13.6–17.2)
IMM GRANULOCYTES # BLD AUTO: 0.01 K/UL (ref 0–0.5)
IMM GRANULOCYTES NFR BLD AUTO: 0.2 % (ref 0–5)
LYMPHOCYTES # BLD: 1.04 K/UL (ref 0.5–4.6)
LYMPHOCYTES NFR BLD: 19.5 % (ref 13–44)
MCH RBC QN AUTO: 29.4 PG (ref 26.1–32.9)
MCHC RBC AUTO-ENTMCNC: 34.7 G/DL (ref 31.4–35)
MCV RBC AUTO: 84.9 FL (ref 82–102)
MICROALBUMIN UR-MCNC: 4.66 MG/DL (ref 0–20)
MICROALBUMIN/CREAT UR-RTO: 44 MG/G (ref 0–30)
MONOCYTES # BLD: 0.38 K/UL (ref 0.1–1.3)
MONOCYTES NFR BLD: 7.1 % (ref 4–12)
NEUTS SEG # BLD: 3.66 K/UL (ref 1.7–8.2)
NEUTS SEG NFR BLD: 68.7 % (ref 43–78)
NRBC # BLD: 0 K/UL (ref 0–0.2)
PLATELET # BLD AUTO: 101 K/UL (ref 150–450)
PMV BLD AUTO: 10.8 FL (ref 9.4–12.3)
POTASSIUM SERPL-SCNC: 4.6 MMOL/L (ref 3.5–5.1)
PROT SERPL-MCNC: 6.7 G/DL (ref 6.3–8.2)
RBC # BLD AUTO: 5.81 M/UL (ref 4.23–5.6)
SODIUM SERPL-SCNC: 143 MMOL/L (ref 136–145)
TSH W FREE THYROID IF ABNORMAL: 1.52 UIU/ML (ref 0.27–4.2)
WBC # BLD AUTO: 5.3 K/UL (ref 4.3–11.1)

## 2025-05-30 RX ORDER — BLOOD SUGAR DIAGNOSTIC
1 STRIP MISCELLANEOUS DAILY
Qty: 300 EACH | Refills: 5 | Status: SHIPPED | OUTPATIENT
Start: 2025-05-30 | End: 2025-08-28

## 2025-05-30 RX ORDER — TRAMADOL HYDROCHLORIDE 50 MG/1
50 TABLET ORAL EVERY 12 HOURS PRN
Qty: 60 TABLET | Refills: 0 | Status: SHIPPED | OUTPATIENT
Start: 2025-05-30 | End: 2025-06-29

## 2025-05-30 RX ORDER — FAMOTIDINE 20 MG/1
20 TABLET, FILM COATED ORAL 2 TIMES DAILY
Qty: 180 TABLET | Refills: 3 | Status: SHIPPED | OUTPATIENT
Start: 2025-05-30

## 2025-05-30 RX ORDER — PRAVASTATIN SODIUM 80 MG/1
80 TABLET ORAL NIGHTLY
Qty: 90 TABLET | Refills: 3 | Status: SHIPPED | OUTPATIENT
Start: 2025-05-30

## 2025-05-30 SDOH — ECONOMIC STABILITY: FOOD INSECURITY: WITHIN THE PAST 12 MONTHS, THE FOOD YOU BOUGHT JUST DIDN'T LAST AND YOU DIDN'T HAVE MONEY TO GET MORE.: NEVER TRUE

## 2025-05-30 SDOH — ECONOMIC STABILITY: FOOD INSECURITY: WITHIN THE PAST 12 MONTHS, YOU WORRIED THAT YOUR FOOD WOULD RUN OUT BEFORE YOU GOT MONEY TO BUY MORE.: NEVER TRUE

## 2025-05-30 ASSESSMENT — PATIENT HEALTH QUESTIONNAIRE - PHQ9
SUM OF ALL RESPONSES TO PHQ QUESTIONS 1-9: 0
2. FEELING DOWN, DEPRESSED OR HOPELESS: NOT AT ALL
1. LITTLE INTEREST OR PLEASURE IN DOING THINGS: NOT AT ALL
SUM OF ALL RESPONSES TO PHQ QUESTIONS 1-9: 0

## 2025-05-30 NOTE — PROGRESS NOTES
[facility] [place] [address]  Phone: [phone]  Fax: [phone]      Problem Based Overview with Integrated Assessment and Plan      History of Present Illness  84-year-old male presents for evaluation of type 2 diabetes, hypertension, chronic inflammatory demyelinating polyneuropathy, diarrhea, thrombocytopenia, back pain, and history of DVT/PE. Accompanied by daughter.    Type 2 diabetes  - Controlled  - No continuous glucose monitor due to insufficient insulin usage  - No dietary modifications based on blood sugar levels  - A1c typically 8-9  - On Lantus once daily  - Concerns about Mounjaro side effects  - Discontinued Jardiance due to adverse effects and lack of efficacy    Hypertension  - Well-managed    Chronic inflammatory demyelinating polyneuropathy  - Diagnosed 2017, no follow-up with neurology  - Independent bathroom use at home, wheelchair for outside mobility  - No falls in over a year  - Possible impact on internal organs    Diarrhea  - Managed by daughter  - Possible IBS or other condition  - Lomotil ineffective  - Discontinuing metformin improved diarrhea frequency  - Severe episodes managed with Imodium  - Previous daily half Imodium tablet under hospice care    Cardiology  - Declined further catheterization  - Frequent PVCs, low pulse rate  - Normal EKG 01/2025  - Thrombocytopenia confirmed by oncologist, no consent for cancer treatment    Back pain  - Managed with tramadol twice daily  - History of laminectomy    Supplemental information: Uses CPAP machine continuously while in bed. Edema: Significant on certain days, consulted vein specialist. 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       Assessment & Plan  1. Type 2 Diabetes Mellitus.  - Goal: A1c <8.  - Labs: Metabolic panel, cell count, thyroid function tests, hemoglobin A1c, urine microalbumin.  - Discontinued metformin, reduced diarrhea.  - Provide

## 2025-05-30 NOTE — PROGRESS NOTES
Ozempic Sample Given to patient by provider    LOT: [id number]  EXP: 5/31/26  NDC: 2162-8163-06  [facility]    [facility] box

## 2025-06-02 ENCOUNTER — RESULTS FOLLOW-UP (OUTPATIENT)
Dept: INTERNAL MEDICINE CLINIC | Facility: CLINIC | Age: 85
End: 2025-06-02

## 2025-06-02 DIAGNOSIS — E11.29 MICROALBUMINURIA DUE TO TYPE 2 DIABETES MELLITUS (HCC): Primary | ICD-10-CM

## 2025-06-02 DIAGNOSIS — R80.9 MICROALBUMINURIA DUE TO TYPE 2 DIABETES MELLITUS (HCC): Primary | ICD-10-CM

## 2025-06-02 RX ORDER — LISINOPRIL 2.5 MG/1
2.5 TABLET ORAL DAILY
Qty: 90 TABLET | Refills: 3 | Status: SHIPPED | OUTPATIENT
Start: 2025-06-02

## 2025-06-14 DIAGNOSIS — M54.50 MIDLINE LOW BACK PAIN WITHOUT SCIATICA, UNSPECIFIED CHRONICITY: ICD-10-CM

## 2025-06-16 RX ORDER — TRAMADOL HYDROCHLORIDE 50 MG/1
50 TABLET ORAL EVERY 12 HOURS PRN
Qty: 60 TABLET | Refills: 0 | OUTPATIENT
Start: 2025-06-16 | End: 2025-07-16

## 2025-06-18 NOTE — TELEPHONE ENCOUNTER
They were faxed 5/30/25. I have a copy of the forms that she brought to the office, as she brought multiple for future use. I will refax them today.

## 2025-06-30 ENCOUNTER — PATIENT MESSAGE (OUTPATIENT)
Dept: INTERNAL MEDICINE CLINIC | Facility: CLINIC | Age: 85
End: 2025-06-30

## 2025-06-30 DIAGNOSIS — M54.50 MIDLINE LOW BACK PAIN WITHOUT SCIATICA, UNSPECIFIED CHRONICITY: Primary | ICD-10-CM

## 2025-06-30 DIAGNOSIS — N40.0 ENLARGED PROSTATE: ICD-10-CM

## 2025-07-01 RX ORDER — TRAMADOL HYDROCHLORIDE 50 MG/1
50 TABLET ORAL EVERY 12 HOURS PRN
Qty: 60 TABLET | Refills: 0 | Status: SHIPPED | OUTPATIENT
Start: 2025-07-01 | End: 2025-07-31

## 2025-07-01 RX ORDER — TAMSULOSIN HYDROCHLORIDE 0.4 MG/1
0.4 CAPSULE ORAL NIGHTLY
Qty: 90 CAPSULE | Refills: 3 | Status: SHIPPED | OUTPATIENT
Start: 2025-07-01

## 2025-07-01 NOTE — TELEPHONE ENCOUNTER
Refill request on:  tamsulosin (FLOMAX) 0.4 MG - Take 1 capsule by mouth at bedtime     LOV - 5/30/25  Next appt - 9/2/25    Rx pended   Thank you!

## 2025-08-04 ENCOUNTER — PATIENT MESSAGE (OUTPATIENT)
Dept: INTERNAL MEDICINE CLINIC | Facility: CLINIC | Age: 85
End: 2025-08-04

## 2025-08-04 DIAGNOSIS — M54.50 MIDLINE LOW BACK PAIN WITHOUT SCIATICA, UNSPECIFIED CHRONICITY: Primary | ICD-10-CM

## 2025-08-04 RX ORDER — TRAMADOL HYDROCHLORIDE 50 MG/1
50 TABLET ORAL EVERY 12 HOURS PRN
Qty: 60 TABLET | Refills: 0 | Status: SHIPPED | OUTPATIENT
Start: 2025-08-04 | End: 2025-09-03

## 2025-08-08 ENCOUNTER — PATIENT MESSAGE (OUTPATIENT)
Dept: INTERNAL MEDICINE CLINIC | Facility: CLINIC | Age: 85
End: 2025-08-08

## (undated) DEVICE — KIT PROCEDURE SURG HEAD AND NECK TOTE

## (undated) DEVICE — PACKING 8004008 NEURAY 200PK 25X76MM: Brand: NEURAY ®

## (undated) DEVICE — SYRINGE MED 50ML LUERLOCK TIP

## (undated) DEVICE — DEVICE INFL PRSS G INDIC DISP (MUST BE PURC IN MULTIPLES OF 5)

## (undated) DEVICE — BALLOON SINUPLASTY 6X16 MM SYS RELIEVA SPINPLUS

## (undated) DEVICE — SPLINT NSL DISSOLVABLE 1.5X50 MM INTNSL CHITOSAN CHITOZOLVE

## (undated) DEVICE — SOLUTION IRRIG 1000ML 0.9% SOD CHL USP POUR PLAS BTL

## (undated) DEVICE — CANISTER, RIGID, 2000CC: Brand: MEDLINE INDUSTRIES, INC.

## (undated) DEVICE — KIT,ANTI FOG,W/SPONGE & FLUID,SOFT PACK: Brand: MEDLINE

## (undated) DEVICE — TUBING, SUCTION, 1/4" X 10', STRAIGHT: Brand: MEDLINE

## (undated) DEVICE — GLOVE ORANGE PI 7 1/2   MSG9075

## (undated) DEVICE — SHEATH 1912000 5PK 4MM/0DEG STORZ XOMED: Brand: ENDO-SCRUB®

## (undated) DEVICE — BLADE 1882040 5PK INFERIOR TURB 2MM

## (undated) DEVICE — SOLUTION IV 1000ML LAC RINGERS PH 6.5 INJ USP VIAFLX PLAS

## (undated) DEVICE — BLADE 1884004HR TRICUT 5PK M4 4MM ROTATE: Brand: TRICUT

## (undated) DEVICE — GOWN,REINF,POLY,ECL,PP SLV,3XL,XLONG: Brand: MEDLINE

## (undated) DEVICE — CODMAN® SURGICAL PATTIES 1" X 3" (2.54CM X 7.62CM): Brand: CODMAN®

## (undated) DEVICE — BLADE 1884006HR RAD40 5PK M4 4MM ROTATE: Brand: RAD

## (undated) DEVICE — SYRINGE MED 30ML STD CLR PLAS LUERLOCK TIP N CTRL DISP